# Patient Record
Sex: FEMALE | Race: WHITE | NOT HISPANIC OR LATINO | Employment: OTHER | URBAN - NONMETROPOLITAN AREA
[De-identification: names, ages, dates, MRNs, and addresses within clinical notes are randomized per-mention and may not be internally consistent; named-entity substitution may affect disease eponyms.]

---

## 2020-12-30 ENCOUNTER — OFFICE VISIT (OUTPATIENT)
Dept: URGENT CARE | Facility: CLINIC | Age: 65
End: 2020-12-30
Payer: MEDICARE

## 2020-12-30 VITALS
DIASTOLIC BLOOD PRESSURE: 62 MMHG | BODY MASS INDEX: 24.86 KG/M2 | OXYGEN SATURATION: 94 % | SYSTOLIC BLOOD PRESSURE: 94 MMHG | RESPIRATION RATE: 14 BRPM | HEIGHT: 62 IN | HEART RATE: 93 BPM | TEMPERATURE: 98.4 F | WEIGHT: 135.1 LBS

## 2020-12-30 DIAGNOSIS — B02.9 HERPES ZOSTER WITHOUT COMPLICATION: ICD-10-CM

## 2020-12-30 PROCEDURE — 99204 OFFICE O/P NEW MOD 45 MIN: CPT | Performed by: PHYSICIAN ASSISTANT

## 2020-12-30 RX ORDER — PANTOPRAZOLE SODIUM 20 MG/1
20 TABLET, DELAYED RELEASE ORAL DAILY
COMMUNITY
End: 2021-01-20

## 2020-12-30 RX ORDER — VALACYCLOVIR HYDROCHLORIDE 1 G/1
1000 TABLET, FILM COATED ORAL 3 TIMES DAILY
Qty: 21 TAB | Refills: 0 | Status: SHIPPED | OUTPATIENT
Start: 2020-12-30 | End: 2021-01-06

## 2020-12-30 ASSESSMENT — ENCOUNTER SYMPTOMS
CHILLS: 0
SHORTNESS OF BREATH: 0
COUGH: 0
GASTROINTESTINAL NEGATIVE: 1
TINGLING: 0
FEVER: 0

## 2020-12-30 NOTE — PROGRESS NOTES
"Subjective:   Nita Sepulveda is a 65 y.o. female who presents for Knee Pain ( swollen left knee with a rash, itchy ,and hot to touch x 4 days )      HPI  Patient is a 65-year-old female who reports of a rash to the inside of her left thigh onset approximately 5 days ago.  She states the rash started with several clusters of pimples and blisters that cause pain and have mild itching.  She has associated tingling.  The pain radiates to her upper inside of her knee.  Her pain is mild to moderate.  She has not tried any medications for current symptoms.  She has a history of chickenpox in the past.  She denies any fever, chills, or injury.  She has no other complaints or concerns.        Review of Systems   Constitutional: Negative for chills and fever.   HENT: Negative.    Respiratory: Negative for cough and shortness of breath.    Cardiovascular: Negative for chest pain.   Gastrointestinal: Negative.    Skin:        Painful rash to the inside of left thigh with associated mild itching.   Neurological: Negative for tingling.   All other systems reviewed and are negative.      Medications:    • pantoprazole    Allergies: Patient has no known allergies.    Problem List: Nita Sepulveda does not have a problem list on file.    Surgical History:  No past surgical history on file.    Past Social Hx: Nita Sepulveda  reports that she has never smoked. She has never used smokeless tobacco. She reports current alcohol use.     Past Family Hx:  Nita Sepulveda family history is not on file.     Problem list, medications, and allergies reviewed by myself today in Epic.     Objective:     BP (!) 94/62 (BP Location: Right arm, Patient Position: Sitting)   Pulse 93   Temp 36.9 °C (98.4 °F) (Temporal)   Resp 14   Ht 1.562 m (5' 1.5\")   Wt 61.3 kg (135 lb 1.6 oz)   SpO2 94%   BMI 25.11 kg/m²     Physical Exam  Vitals signs reviewed.   Constitutional:       General: She is not in acute distress.     Appearance: Normal appearance. She is not " ill-appearing or toxic-appearing.   HENT:      Mouth/Throat:      Mouth: Mucous membranes are moist.      Pharynx: Oropharynx is clear. No oropharyngeal exudate or posterior oropharyngeal erythema.   Eyes:      Conjunctiva/sclera: Conjunctivae normal.   Neck:      Musculoskeletal: Neck supple.   Cardiovascular:      Rate and Rhythm: Normal rate and regular rhythm.      Heart sounds: Normal heart sounds.   Pulmonary:      Effort: Pulmonary effort is normal. No respiratory distress.      Breath sounds: Normal breath sounds. No rhonchi or rales.   Lymphadenopathy:      Cervical: No cervical adenopathy.   Skin:     General: Skin is warm and dry.             Comments: Several grouped clusters of papules and vesicles in a dermatomal distribution.  Negative surrounding erythema, edema.  Mild tenderness to palpation.  Negative drainage.  Negative proximal streaking.  Negative fluctuance.   Neurological:      General: No focal deficit present.      Mental Status: She is alert and oriented to person, place, and time.   Psychiatric:         Mood and Affect: Mood normal.         Behavior: Behavior normal.         Assessment/Plan:     Diagnosis and associated orders:     1. Herpes zoster without complication  valacyclovir (VALTREX) 1 GM Tab    mupirocin (BACTROBAN) 2 % Ointment      Comments/MDM:     • Discussed with patient her signs symptoms are consistent with shingles.  It appears that some of the vesicles have ruptured.  No drainage.   • Valacyclovir for 7 days.  • Bactroban ointment topically to cover for any overlying superficial staph infection.   • Tylenol for pain.  Keep area covered.  • Return if symptoms are not improving or any worsening such as any surrounding erythema, edema, drainage, fevers, chills or any other concerns.       Red flags discussed  Supportive care, differential diagnoses, and indications for immediate follow-up discussed with patient.    Patient expresses understanding and agrees to plan. Patient  denies any other questions or concerns.     Advised the patient to follow-up with the primary care physician for recheck, reevaluation, and consideration of further management.    Please note that this dictation was created using voice recognition software. I have made a reasonable attempt to correct obvious errors, but I expect that there are errors of grammar and possibly content that I did not discover before finalizing the note.    This note was electronically signed by Rell Santos PA-C

## 2021-01-04 ENCOUNTER — OFFICE VISIT (OUTPATIENT)
Dept: URGENT CARE | Facility: CLINIC | Age: 66
End: 2021-01-04
Payer: MEDICARE

## 2021-01-04 VITALS
HEART RATE: 83 BPM | BODY MASS INDEX: 25.68 KG/M2 | OXYGEN SATURATION: 92 % | RESPIRATION RATE: 16 BRPM | WEIGHT: 136 LBS | SYSTOLIC BLOOD PRESSURE: 120 MMHG | DIASTOLIC BLOOD PRESSURE: 90 MMHG | TEMPERATURE: 98.2 F | HEIGHT: 61 IN

## 2021-01-04 DIAGNOSIS — T88.7XXA NON-DOSE-RELATED ADVERSE REACTION TO MEDICATION, INITIAL ENCOUNTER: ICD-10-CM

## 2021-01-04 DIAGNOSIS — R10.13 DYSPEPSIA: ICD-10-CM

## 2021-01-04 DIAGNOSIS — K06.8 BLEEDING GUMS: ICD-10-CM

## 2021-01-04 PROCEDURE — 99213 OFFICE O/P EST LOW 20 MIN: CPT | Performed by: PHYSICIAN ASSISTANT

## 2021-01-04 NOTE — PROGRESS NOTES
"Subjective:      Nita Sepulveda is a 65 y.o. female who presents with Allergic Reaction (possible reaction on valacyclovir, teeth was bleeding, stomach upset )    Medications:    • mupirocin Oint  • pantoprazole  • valacyclovir Tabs    Allergies: Patient has no known allergies.    Problem List: Nita Sepulveda does not have a problem list on file.    Surgical History:  No past surgical history on file.    Past Social Hx: Nita Sepulveda  reports that she has never smoked. She has never used smokeless tobacco. She reports current alcohol use.     Past Family Hx:  Nita Sepulveda family history is not on file.     Problem list, medications, and allergies reviewed by myself today in Epic.         Patient presents with:  Allergic Reaction: possible reaction on valacyclovir, gums were bleeding after brushingyesterday, and stomach was quite upset after taking the valacyclovir.  Pt did not take her dose today.  Pt denies congestion, cough, swelling to tongue, lips, throat, rash, n/v/d, sob.  Pt is not sure if she needs different medication for her shingles rash.       Medication Reaction  This is a new problem. The current episode started yesterday. Episode frequency: once. The problem has been resolved. Associated symptoms include anorexia and nausea. Pertinent negatives include no abdominal pain, fever or vomiting. The symptoms are aggravated by drinking and eating. Treatments tried: salt water swishes with cold water. The treatment provided significant relief.       Review of Systems   Constitutional: Negative for fever.   HENT:        Gums bleeding     Gastrointestinal: Positive for anorexia and nausea. Negative for abdominal pain and vomiting.   All other systems reviewed and are negative.         Objective:     /90   Pulse 83   Temp 36.8 °C (98.2 °F)   Resp 16   Ht 1.549 m (5' 1\")   Wt 61.7 kg (136 lb)   SpO2 92%   BMI 25.70 kg/m²      Physical Exam  Vitals signs and nursing note reviewed.   Constitutional:       " General: She is not in acute distress.     Appearance: Normal appearance. She is well-developed and normal weight. She is not ill-appearing or toxic-appearing.   HENT:      Head: Normocephalic and atraumatic.      Right Ear: Tympanic membrane normal.      Left Ear: Tympanic membrane normal.      Nose: Nose normal.      Mouth/Throat:      Lips: Pink.      Mouth: Mucous membranes are moist. No injury or oral lesions.      Dentition: No gingival swelling or gum lesions.      Pharynx: Oropharynx is clear. Uvula midline.      Comments: No evidence of lesion or bleeding gums.  Good oral hygiene.   Eyes:      Extraocular Movements: Extraocular movements intact.      Conjunctiva/sclera: Conjunctivae normal.      Pupils: Pupils are equal, round, and reactive to light.   Neck:      Musculoskeletal: Normal range of motion and neck supple.   Cardiovascular:      Rate and Rhythm: Normal rate and regular rhythm.      Pulses: Normal pulses.      Heart sounds: Normal heart sounds.   Pulmonary:      Effort: Pulmonary effort is normal.      Breath sounds: Normal breath sounds.   Abdominal:      General: Bowel sounds are normal.      Palpations: Abdomen is soft.   Musculoskeletal: Normal range of motion.   Skin:     General: Skin is warm and dry.      Capillary Refill: Capillary refill takes less than 2 seconds.   Neurological:      General: No focal deficit present.      Mental Status: She is alert and oriented to person, place, and time.      Cranial Nerves: No cranial nerve deficit.      Motor: Motor function is intact.      Coordination: Coordination is intact.      Gait: Gait normal.   Psychiatric:         Mood and Affect: Mood normal.                 Assessment/Plan:         1. Non-dose-related adverse reaction to medication, initial encounter     2. Bleeding gums     3. Dyspepsia       Patient was evaluated in clinic today while wearing appropriate personal protective equipment.      PT instructed to DC remainder of the  valacylovir.      PT can take otc pepcid, maalox, mylanta, tums, or any other otc antacid for her stomach ache.      No respiratory involvement, but antihistamine use was discussed in case any develop. Pt verbalized understanding of this information.      PT should follow up with PCP in 1-2 days for re-evaluation if symptoms have not improved.      Discussed red flags and reasons to return to UC or ED.      Pt and/or family verbalized understanding of diagnosis and follow up instructions and was offered informational handout on diagnosis.  PT discharged.

## 2021-01-10 ASSESSMENT — ENCOUNTER SYMPTOMS
FEVER: 0
VOMITING: 0
ANOREXIA: 1
NAUSEA: 1
ABDOMINAL PAIN: 0

## 2021-01-14 ENCOUNTER — TELEPHONE (OUTPATIENT)
Dept: SCHEDULING | Facility: IMAGING CENTER | Age: 66
End: 2021-01-14

## 2021-01-20 ENCOUNTER — OFFICE VISIT (OUTPATIENT)
Dept: MEDICAL GROUP | Facility: PHYSICIAN GROUP | Age: 66
End: 2021-01-20
Payer: MEDICARE

## 2021-01-20 VITALS
OXYGEN SATURATION: 96 % | RESPIRATION RATE: 16 BRPM | SYSTOLIC BLOOD PRESSURE: 108 MMHG | HEART RATE: 70 BPM | DIASTOLIC BLOOD PRESSURE: 70 MMHG | WEIGHT: 136 LBS | TEMPERATURE: 98 F | BODY MASS INDEX: 25.7 KG/M2

## 2021-01-20 DIAGNOSIS — Z76.89 ESTABLISHING CARE WITH NEW DOCTOR, ENCOUNTER FOR: ICD-10-CM

## 2021-01-20 DIAGNOSIS — Z12.31 ENCOUNTER FOR SCREENING MAMMOGRAM FOR MALIGNANT NEOPLASM OF BREAST: ICD-10-CM

## 2021-01-20 DIAGNOSIS — B02.9 HERPES ZOSTER WITHOUT COMPLICATION: ICD-10-CM

## 2021-01-20 DIAGNOSIS — D50.9 IRON DEFICIENCY ANEMIA, UNSPECIFIED IRON DEFICIENCY ANEMIA TYPE: ICD-10-CM

## 2021-01-20 DIAGNOSIS — K25.7 CHRONIC GASTRIC ULCER WITHOUT HEMORRHAGE AND WITHOUT PERFORATION: ICD-10-CM

## 2021-01-20 DIAGNOSIS — K04.7 DENTAL INFECTION: ICD-10-CM

## 2021-01-20 DIAGNOSIS — Z13.820 SCREENING FOR OSTEOPOROSIS: ICD-10-CM

## 2021-01-20 PROCEDURE — 99214 OFFICE O/P EST MOD 30 MIN: CPT | Performed by: NURSE PRACTITIONER

## 2021-01-20 RX ORDER — PANTOPRAZOLE SODIUM 20 MG/1
20 TABLET, DELAYED RELEASE ORAL DAILY
COMMUNITY
End: 2021-01-20

## 2021-01-20 RX ORDER — DOXYCYCLINE HYCLATE 100 MG
TABLET ORAL
COMMUNITY
Start: 2021-01-13 | End: 2021-03-16

## 2021-01-20 RX ORDER — PANTOPRAZOLE SODIUM 20 MG/1
20 TABLET, DELAYED RELEASE ORAL DAILY
COMMUNITY
End: 2021-03-09 | Stop reason: SDUPTHER

## 2021-01-20 ASSESSMENT — ENCOUNTER SYMPTOMS
DIARRHEA: 1
PSYCHIATRIC NEGATIVE: 1
NEUROLOGICAL NEGATIVE: 1
EYES NEGATIVE: 1
CARDIOVASCULAR NEGATIVE: 1
CONSTITUTIONAL NEGATIVE: 1
RESPIRATORY NEGATIVE: 1
MUSCULOSKELETAL NEGATIVE: 1

## 2021-01-20 ASSESSMENT — PATIENT HEALTH QUESTIONNAIRE - PHQ9: CLINICAL INTERPRETATION OF PHQ2 SCORE: 0

## 2021-01-20 NOTE — ASSESSMENT & PLAN NOTE
Patient was seen in urgent care for a rash to her left inner knee was diagnosed with shingles.  Patient was started on valacyclovir but had a possible reaction with bleeding gums and dyspepsia.  Patient no longer taking valacyclovir and currently using Bactroban ointment on the shingles rash.  Denies any current issues at this time denies fever chills nausea vomiting.  States rash is getting better and beginning to heal.

## 2021-01-20 NOTE — PROGRESS NOTES
Chief Complaint   Patient presents with   • Establish Care     prior [facility] pt      Subjective:     Nita Sepulveda is a 65 y.o. female presenting for       Chronic gastric ulcer without hemorrhage and without perforation  Patient has been on Protonix for the last 8 years.  Was seen by Dr. Jacobson at [facility].  Was diagnosed with a gastric ulcer is currently taking 20 mg Protonix daily.  Controlled, continue with current meds and lifestyle.        Herpes zoster without complication  Patient was seen in urgent care for a rash to her left inner knee was diagnosed with shingles.  Patient was started on valacyclovir but had a possible reaction with bleeding gums and dyspepsia.  Patient no longer taking valacyclovir and currently using Bactroban ointment on the shingles rash.  Denies any current issues at this time denies fever chills nausea vomiting.  States rash is getting better and beginning to heal.      Dental infection  Was seen last week by her dentist.  Possible infection to her left incisisor tooth.  Patient started on doxycycline on 1/13/2021.   Denies chills or fevers at this time.          Review of Systems   Constitutional: Negative.    HENT: Negative.    Eyes: Negative.    Respiratory: Negative.    Cardiovascular: Negative.    Gastrointestinal: Positive for diarrhea.        Taking antibiotics, they give her diarrhea   Genitourinary: Negative.    Musculoskeletal: Negative.    Skin: Positive for rash.        Left inner thigh, not painful anymore, diagnosed with shingles   Neurological: Negative.    Psychiatric/Behavioral: Negative.         Review of systems:  See above.   Denies any symptoms unless previously indicated.        Current Outpatient Medications:   •  doxycycline (VIBRAMYCIN) 100 MG Tab, , Disp: , Rfl:   •  pantoprazole (PROTONIX) 20 MG tablet, Take 20 mg by mouth every day., Disp: , Rfl:   •  mupirocin (BACTROBAN) 2 % Ointment, Apply 1 Application topically 2 times a day., Disp: 22 g, Rfl: 0  •  Multiple  Vitamins-Minerals (MULTIVITAMIN ADULT PO), Take 1 Tab by mouth every day., Disp: , Rfl:     Past Medical History:   Diagnosis Date   • Allergic rhinitis 6/2/2005   • Dysuria 8/30/2011   • Nocturia 8/30/2011   • Urgency of urination 8/30/2011       History reviewed. No pertinent surgical history.    Family History   Problem Relation Age of Onset   • Psychiatric Illness Mother    • Cancer Father    • Heart Disease Father    • Cancer Sister    • Diabetes Son        Hydrocodone-acetaminophen    Allergies, past medical history, past surgical history, family history, social history reviewed and updated    Objective:     Vitals: /70 (BP Location: Right arm, Patient Position: Sitting)   Pulse 70   Temp 36.7 °C (98 °F)   Resp 16   Wt 61.7 kg (136 lb)   SpO2 96%   BMI 25.70 kg/m²   General: Alert, pleasant, NAD  EYES:   PERRL, EOMI, no icterus or pallor.  Conjunctivae and lids normal.   HENT:  Normocephalic.  External ears normal. Tympanic membranes pearly, opaque.  No nasal drainage present.  Oropharynx non-erythematous, mucous membranes moist.  Neck supple.   No thyromegaly or masses palpated. No cervical or supraclavicular lymphadenopathy.   Heart: Regular rate and rhythm.  S1 and S2 normal.  No murmurs appreciated.  Respiratory: Normal respiratory effort.  Clear to auscultation bilaterally.  Abdomen: Non-distended, soft, non-tender, no guarding/rebound. Bowel sounds present.  No hepatosplenomegaly.  No hernias.  Skin: Warm, dry, no rashes to visible skin. See ROS for patient description.  Musculoskeletal: Gait is normal.  Moves all extremities well.    Extremities: No edema noted bilaterally.   Neurological: No tremors, sensation grossly intact, tone/strength normal, gait is normal, CN2-12 intact.  Psych:  Affect/mood is normal, judgement is good, memory is intact, grooming is appropriate.    Assessment/Plan:     1. Establishing care with new doctor, encounter for  - CBC WITHOUT DIFFERENTIAL; Future  - Comp  Metabolic Panel; Future  - Lipid Profile; Future  - TSH WITH REFLEX TO FT4; Future  - VITAMIN D,25 HYDROXY; Future  - MA-SCREENING MAMMO BILAT W/TOMOSYNTHESIS W/CAD; Future  - DS-BONE DENSITY STUDY (DEXA); Future    Pleasant 65-year-old female who is establishing care today.    Colon cancer screening : Per notes from her last PCP she had been given a Cologuard with regards to this she states that she has not done it yet but will have it completed before next visit.   Breast cancer screening :states that her next mammogram will be in March 2021 order placed  Osteoporosis screening: dexa Scan ordered. she believes she has never done this before    2. Herpes zoster without complication  - mupirocin (BACTROBAN) 2 % Ointment; Apply 1 Application topically 2 times a day.  Dispense: 22 g; Refill: 0  No longer taking valacyclovir due to possible reaction from bleeding gums and dyspepsia.  Patient has been using the Bactroban ointment and states that she has been doing fine.  Complains of an occasional itching but feels that this is from healing.  Denies any pain at this time    3. Chronic gastric ulcer without hemorrhage and without perforation  - CBC WITHOUT DIFFERENTIAL; Future  - Comp Metabolic Panel; Future  Was seen by Dr. Jacobson at [facility]    4. Dental infection  - CBC WITHOUT DIFFERENTIAL; Future  Currently taking doxycycline.  Has a dentist appointment coming up in the next 2 weeks.    5. Iron deficiency anemia, unspecified iron deficiency anemia type  - CBC WITHOUT DIFFERENTIAL; Future    6. Screening for osteoporosis  - DS-BONE DENSITY STUDY (DEXA); Future    7. Encounter for screening mammogram for malignant neoplasm of breast  - MA-SCREENING MAMMO BILAT W/TOMOSYNTHESIS W/CAD; Future       No follow-ups on file.      Discussed with patient possible alternative diagnoses, patient is to take all medications as prescribed.     Reviewed indication, dosage, usage and potential adverse effects of prescribed medications.      Reviewed risks and benefits of treatment plan. Patient verbalizes understanding of all instruction and verbally agrees to plan.    Please note that this dictation was created using voice recognition software. I have made every reasonable attempt to correct obvious errors, but I expect that there are errors of grammar and possibly content that I did not discover before finalizing the note.

## 2021-01-20 NOTE — ASSESSMENT & PLAN NOTE
Patient has been on Protonix for the last 8 years.  Was seen by Dr. Jacobson at [facility].  Was diagnosed with a gastric ulcer is currently taking 20 mg Protonix daily.  Controlled, continue with current meds and lifestyle.

## 2021-01-20 NOTE — ASSESSMENT & PLAN NOTE
Was seen last week by her dentist.  Possible infection to her left incisisor tooth.  Patient started on doxycycline on 1/13/2021.   Denies chills or fevers at this time.

## 2021-03-03 DIAGNOSIS — Z23 NEED FOR VACCINATION: ICD-10-CM

## 2021-03-09 RX ORDER — PANTOPRAZOLE SODIUM 20 MG/1
20 TABLET, DELAYED RELEASE ORAL DAILY
Qty: 90 TABLET | Refills: 3 | Status: SHIPPED | OUTPATIENT
Start: 2021-03-09 | End: 2022-01-04 | Stop reason: SDUPTHER

## 2021-03-09 NOTE — TELEPHONE ENCOUNTER
Received request via: Patient    Was the patient seen in the last year in this department? Yes    Does the patient have an active prescription (recently filled or refills available) for medication(s) requested? No  01/20/2021  Last office visit

## 2021-03-16 ENCOUNTER — OFFICE VISIT (OUTPATIENT)
Dept: MEDICAL GROUP | Facility: PHYSICIAN GROUP | Age: 66
End: 2021-03-16
Payer: MEDICARE

## 2021-03-16 VITALS
BODY MASS INDEX: 25.83 KG/M2 | RESPIRATION RATE: 18 BRPM | WEIGHT: 140.4 LBS | HEART RATE: 74 BPM | SYSTOLIC BLOOD PRESSURE: 110 MMHG | DIASTOLIC BLOOD PRESSURE: 70 MMHG | TEMPERATURE: 97.8 F | HEIGHT: 62 IN | OXYGEN SATURATION: 95 %

## 2021-03-16 DIAGNOSIS — Z13.220 SCREENING FOR HYPERLIPIDEMIA: ICD-10-CM

## 2021-03-16 DIAGNOSIS — K25.7 CHRONIC GASTRIC ULCER WITHOUT HEMORRHAGE AND WITHOUT PERFORATION: ICD-10-CM

## 2021-03-16 DIAGNOSIS — G89.29 CHRONIC PAIN OF LEFT KNEE: ICD-10-CM

## 2021-03-16 DIAGNOSIS — D50.9 IRON DEFICIENCY ANEMIA, UNSPECIFIED IRON DEFICIENCY ANEMIA TYPE: ICD-10-CM

## 2021-03-16 DIAGNOSIS — R45.89 PREDOMINANT DISTURBANCE OF EMOTIONS: ICD-10-CM

## 2021-03-16 DIAGNOSIS — M54.42 ACUTE LEFT-SIDED LOW BACK PAIN WITH LEFT-SIDED SCIATICA: ICD-10-CM

## 2021-03-16 DIAGNOSIS — M25.562 CHRONIC PAIN OF LEFT KNEE: ICD-10-CM

## 2021-03-16 PROBLEM — Z76.89 ESTABLISHING CARE WITH NEW DOCTOR, ENCOUNTER FOR: Status: RESOLVED | Noted: 2021-01-20 | Resolved: 2021-03-16

## 2021-03-16 PROCEDURE — 99213 OFFICE O/P EST LOW 20 MIN: CPT | Performed by: NURSE PRACTITIONER

## 2021-03-16 ASSESSMENT — ENCOUNTER SYMPTOMS
DIZZINESS: 0
FALLS: 0
BACK PAIN: 1
TINGLING: 0
HEADACHES: 0
WEAKNESS: 0

## 2021-03-16 NOTE — ASSESSMENT & PLAN NOTE
"When opening a documentation only encounter, be sure to enter in \"Chief Complaint\" Forms and in \" Comments\" Title of form, description if needed.    Donovan is a 22 month old  male  Form received via: Fax  Form now resides in: Provider Ready    Angelique Schumacher CMA        Form was completed during 12-19-19 OV. A copy was given to mom and a copy should have been scanned into the patients chart, per OV notes. See 12-19-19 OV for further information. Closing encounter.    Angelique Schumacher CMA on 4/13/2020 at 9:35 AM            " Chronic and stable condition.    She states that roughly a year ago when she was doing outdoor work her left leg got tied up in a house and she twisted it.  She states that she did complete PT however it was expensive's and at the time she did not seem to feel that it was working very well.  She states that she went to orthopedic who did not want to do an MRI at that time.  She states that she was placed in a boot but felt that the boot did more damage than good.  Denies any numbness tingling.  Denies instability of her knee.

## 2021-03-16 NOTE — PROGRESS NOTES
CC:  Chief Complaint   Patient presents with   • Hip Pain     x 1 week ago   • Leg Pain     left leg pain       HISTORY OF PRESENT ILLNESS: Patient is a 65 y.o. female established patient presenting:      Acute left-sided low back pain with left-sided sciatica  Undiagnosed new problem with uncertain prognosis.    States that 1 week ago she was attempting to get out of a hot tub and was twisting when she felt pain to her low back down to her left leg.  She feels as though it was as if somebody punched her.   She does state discomfort to her left knee however she states that been that way for about a year when she originally injured her knee.   She denies any trauma or falls.  She denies any numbness or tingling or loss of bowel or bladder continence.  Denies any swelling of the extremity.  She is able to ambulate on it    Chronic pain of left knee  Chronic and stable condition.    She states that roughly a year ago when she was doing outdoor work her left leg got tied up in a house and she twisted it.  She states that she did complete PT however it was expensive's and at the time she did not seem to feel that it was working very well.  She states that she went to orthopedic who did not want to do an MRI at that time.  She states that she was placed in a boot but felt that the boot did more damage than good.  Denies any numbness tingling.  Denies instability of her knee.      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 90 tablet 3   • Multiple Vitamins-Minerals (MULTIVITAMIN ADULT PO) Take 1 Tab by mouth every day.       No current facility-administered medications for this visit.       Social History     Tobacco Use   • Smoking status: Never Smoker   • Smokeless tobacco: Never Used   Substance Use Topics   • Alcohol use: Yes     Comment: occ   • Drug use: Not Currently     Social History     Social History Narrative   • Not on file       Family History   Problem Relation Age of Onset   • Psychiatric Illness Mother    • Cancer Father    • Heart Disease Father    • Cancer Sister    • Diabetes Son         Review of Systems   Genitourinary: Negative.    Musculoskeletal: Positive for back pain and joint pain. Negative for falls.   Neurological: Negative for dizziness, tingling, weakness and headaches.             - NOTE: All other systems reviewed and are negative, except as in HPI.      Exam:    /70   Pulse 74   Temp 36.6 °C (97.8 °F) (Temporal)   Resp 18   Ht 1.575 m (5' 2\")   Wt 63.7 kg (140 lb 6.4 oz)   SpO2 95%  Body mass index is 25.68 kg/m².    General: Alert, pleasant, NAD  EYES:   PERRL, EOMI, no icterus or pallor.  Conjunctivae and lids normal.   HENT:  Normocephalic.  External ears normal. Tympanic membranes pearly, opaque.  No nasal drainage present.  Oropharynx non-erythematous, mucous membranes moist.  Neck supple.   No thyromegaly or masses palpated. No cervical or supraclavicular lymphadenopathy.  Heart: Regular rate and rhythm.  S1 and S2 normal.  No murmurs appreciated.  Respiratory: Normal respiratory effort.  Clear to auscultation bilaterally.  Abdomen: Non-distended, soft, non-tender, no guarding/rebound. Bowel sounds present.  No hepatosplenomegaly.  No hernias.  Skin: Warm, dry, no rashes.  Musculoskeletal: Gait is normal.  Moves all extremities well.  Straight leg test completed " which was negative.  Extremities: No clubbing, cyanosis or edema noted.   Neurological: No tremors, sensation grossly intact, tone/strength normal, gait is normal, CN2-12 intact.  Psych:  Affect/mood is normal, judgement is good, memory is intact, grooming is appropriate.    Assessment/Plan:  1. Chronic pain of left knee  2. Acute left-sided low back pain with left-sided sciatica  - REFERRAL TO PHYSICAL THERAPY  Patient also asked if she can go to her chiropractor or her acupuncturist for this as well.  We will see what the evaluation shows and possibly do from here imaging at that time.    3. Iron deficiency anemia, unspecified iron deficiency anemia type  - CBC WITH DIFFERENTIAL; Future  - IRON/TOTAL IRON BIND; Future  - IRON; Future  - FERRITIN; Future    4. Chronic gastric ulcer without hemorrhage and without perforation  - CBC WITH DIFFERENTIAL; Future  - Comp Metabolic Panel; Future    5. Screening for hyperlipidemia  - Lipid Profile; Future    6. Predominant disturbance of emotions  - VITAMIN D,25 HYDROXY; Future  - TSH WITH REFLEX TO FT4; Future    Discussed with patient possible alternative diagnose.    Reviewed risks and benefits of treatment plan. Patient verbalizes understanding of all instruction and verbally agrees to plan.      Return in about 5 weeks (around 4/22/2021) for follow up labs and PT referral .    My total time spent caring for the patient on the day of the encounter was 25 minutes.   This does not include time spent on separately billable procedures/tests.     Please note that this dictation was created using voice recognition software. I have made every reasonable attempt to correct obvious errors, but I expect that there are errors of grammar and possibly content that I did not discover before finalizing the note.

## 2021-03-16 NOTE — PATIENT INSTRUCTIONS
1. Get labs completed prior to our next visit.  These will be fasting labs, do not eat or drink 8 to 10 hours prior to your labs.  Make sure that you drink lots of water.  We will discuss the results of these at our next appointment. [phone]    2.  Referral sent to Physical Therapy.  If you do not hear from them in the next 3-5 business days please reach out to me through [employer]. [phone]

## 2021-03-16 NOTE — ASSESSMENT & PLAN NOTE
Undiagnosed new problem with uncertain prognosis.    States that 1 week ago she was attempting to get out of a hot tub and was twisting when she felt pain to her low back down to her left leg.  She feels as though it was as if somebody punched her.   She does state discomfort to her left knee however she states that been that way for about a year when she originally injured her knee.   She denies any trauma or falls.  She denies any numbness or tingling or loss of bowel or bladder continence.  Denies any swelling of the extremity.  She is able to ambulate on it

## 2021-03-29 ENCOUNTER — HOSPITAL ENCOUNTER (OUTPATIENT)
Dept: LAB | Facility: MEDICAL CENTER | Age: 66
End: 2021-03-29
Attending: NURSE PRACTITIONER
Payer: MEDICARE

## 2021-03-29 DIAGNOSIS — D50.9 IRON DEFICIENCY ANEMIA, UNSPECIFIED IRON DEFICIENCY ANEMIA TYPE: ICD-10-CM

## 2021-03-29 DIAGNOSIS — K25.7 CHRONIC GASTRIC ULCER WITHOUT HEMORRHAGE AND WITHOUT PERFORATION: ICD-10-CM

## 2021-03-29 DIAGNOSIS — R45.89 PREDOMINANT DISTURBANCE OF EMOTIONS: ICD-10-CM

## 2021-03-29 DIAGNOSIS — Z13.220 SCREENING FOR HYPERLIPIDEMIA: ICD-10-CM

## 2021-03-29 LAB
BASOPHILS # BLD AUTO: 0.9 % (ref 0–1.8)
BASOPHILS # BLD: 0.06 K/UL (ref 0–0.12)
EOSINOPHIL # BLD AUTO: 0.16 K/UL (ref 0–0.51)
EOSINOPHIL NFR BLD: 2.5 % (ref 0–6.9)
ERYTHROCYTE [DISTWIDTH] IN BLOOD BY AUTOMATED COUNT: 42.7 FL (ref 35.9–50)
HCT VFR BLD AUTO: 42.3 % (ref 37–47)
HGB BLD-MCNC: 14.5 G/DL (ref 12–16)
IMM GRANULOCYTES # BLD AUTO: 0.02 K/UL (ref 0–0.11)
IMM GRANULOCYTES NFR BLD AUTO: 0.3 % (ref 0–0.9)
LYMPHOCYTES # BLD AUTO: 1.24 K/UL (ref 1–4.8)
LYMPHOCYTES NFR BLD: 19.6 % (ref 22–41)
MCH RBC QN AUTO: 30.6 PG (ref 27–33)
MCHC RBC AUTO-ENTMCNC: 34.3 G/DL (ref 33.6–35)
MCV RBC AUTO: 89.2 FL (ref 81.4–97.8)
MONOCYTES # BLD AUTO: 0.37 K/UL (ref 0–0.85)
MONOCYTES NFR BLD AUTO: 5.9 % (ref 0–13.4)
NEUTROPHILS # BLD AUTO: 4.47 K/UL (ref 2–7.15)
NEUTROPHILS NFR BLD: 70.8 % (ref 44–72)
NRBC # BLD AUTO: 0 K/UL
NRBC BLD-RTO: 0 /100 WBC
PLATELET # BLD AUTO: 318 K/UL (ref 164–446)
PMV BLD AUTO: 11.1 FL (ref 9–12.9)
RBC # BLD AUTO: 4.74 M/UL (ref 4.2–5.4)
WBC # BLD AUTO: 6.3 K/UL (ref 4.8–10.8)

## 2021-03-29 PROCEDURE — 85025 COMPLETE CBC W/AUTO DIFF WBC: CPT

## 2021-03-29 PROCEDURE — 84443 ASSAY THYROID STIM HORMONE: CPT

## 2021-03-29 PROCEDURE — 83540 ASSAY OF IRON: CPT

## 2021-03-29 PROCEDURE — 82728 ASSAY OF FERRITIN: CPT

## 2021-03-29 PROCEDURE — 36415 COLL VENOUS BLD VENIPUNCTURE: CPT

## 2021-03-29 PROCEDURE — 80061 LIPID PANEL: CPT | Mod: GA

## 2021-03-29 PROCEDURE — 83550 IRON BINDING TEST: CPT

## 2021-03-29 PROCEDURE — 80053 COMPREHEN METABOLIC PANEL: CPT

## 2021-03-30 LAB
ALBUMIN SERPL BCP-MCNC: 4.3 G/DL (ref 3.2–4.9)
ALBUMIN/GLOB SERPL: 1.6 G/DL
ALP SERPL-CCNC: 100 U/L (ref 30–99)
ALT SERPL-CCNC: 14 U/L (ref 2–50)
ANION GAP SERPL CALC-SCNC: 9 MMOL/L (ref 7–16)
AST SERPL-CCNC: 16 U/L (ref 12–45)
BILIRUB SERPL-MCNC: 0.3 MG/DL (ref 0.1–1.5)
BUN SERPL-MCNC: 16 MG/DL (ref 8–22)
CALCIUM SERPL-MCNC: 9.4 MG/DL (ref 8.5–10.5)
CHLORIDE SERPL-SCNC: 102 MMOL/L (ref 96–112)
CHOLEST SERPL-MCNC: 238 MG/DL (ref 100–199)
CO2 SERPL-SCNC: 27 MMOL/L (ref 20–33)
CREAT SERPL-MCNC: 0.83 MG/DL (ref 0.5–1.4)
FASTING STATUS PATIENT QL REPORTED: NORMAL
FERRITIN SERPL-MCNC: 77.9 NG/ML (ref 10–291)
GLOBULIN SER CALC-MCNC: 2.7 G/DL (ref 1.9–3.5)
GLUCOSE SERPL-MCNC: 102 MG/DL (ref 65–99)
HDLC SERPL-MCNC: 57 MG/DL
IRON SATN MFR SERPL: 28 % (ref 15–55)
IRON SERPL-MCNC: 83 UG/DL (ref 40–170)
LDLC SERPL CALC-MCNC: 151 MG/DL
POTASSIUM SERPL-SCNC: 4 MMOL/L (ref 3.6–5.5)
PROT SERPL-MCNC: 7 G/DL (ref 6–8.2)
SODIUM SERPL-SCNC: 138 MMOL/L (ref 135–145)
TIBC SERPL-MCNC: 295 UG/DL (ref 250–450)
TRIGL SERPL-MCNC: 149 MG/DL (ref 0–149)
TSH SERPL DL<=0.005 MIU/L-ACNC: 3.9 UIU/ML (ref 0.38–5.33)
UIBC SERPL-MCNC: 212 UG/DL (ref 110–370)

## 2021-04-01 ENCOUNTER — HOSPITAL ENCOUNTER (OUTPATIENT)
Dept: RADIOLOGY | Facility: MEDICAL CENTER | Age: 66
End: 2021-04-01
Payer: MEDICARE

## 2021-04-12 ENCOUNTER — HOSPITAL ENCOUNTER (OUTPATIENT)
Dept: RADIOLOGY | Facility: MEDICAL CENTER | Age: 66
End: 2021-04-12
Attending: NURSE PRACTITIONER
Payer: MEDICARE

## 2021-04-12 DIAGNOSIS — Z76.89 ESTABLISHING CARE WITH NEW DOCTOR, ENCOUNTER FOR: ICD-10-CM

## 2021-04-12 DIAGNOSIS — Z12.31 ENCOUNTER FOR SCREENING MAMMOGRAM FOR MALIGNANT NEOPLASM OF BREAST: ICD-10-CM

## 2021-04-12 PROCEDURE — 77063 BREAST TOMOSYNTHESIS BI: CPT

## 2021-04-22 ENCOUNTER — OFFICE VISIT (OUTPATIENT)
Dept: MEDICAL GROUP | Facility: PHYSICIAN GROUP | Age: 66
End: 2021-04-22
Payer: MEDICARE

## 2021-04-22 VITALS
TEMPERATURE: 98.8 F | HEART RATE: 73 BPM | DIASTOLIC BLOOD PRESSURE: 82 MMHG | RESPIRATION RATE: 20 BRPM | WEIGHT: 139.6 LBS | OXYGEN SATURATION: 98 % | HEIGHT: 62 IN | SYSTOLIC BLOOD PRESSURE: 132 MMHG | BODY MASS INDEX: 25.69 KG/M2

## 2021-04-22 DIAGNOSIS — G89.29 CHRONIC PAIN OF LEFT KNEE: ICD-10-CM

## 2021-04-22 DIAGNOSIS — E78.00 ELEVATED LOW DENSITY LIPOPROTEIN (LDL) CHOLESTEROL LEVEL: ICD-10-CM

## 2021-04-22 DIAGNOSIS — M25.562 CHRONIC PAIN OF LEFT KNEE: ICD-10-CM

## 2021-04-22 DIAGNOSIS — Z12.12 SCREENING FOR COLORECTAL CANCER: ICD-10-CM

## 2021-04-22 DIAGNOSIS — Z12.11 SCREENING FOR COLORECTAL CANCER: ICD-10-CM

## 2021-04-22 PROBLEM — B02.9 HERPES ZOSTER WITHOUT COMPLICATION: Status: RESOLVED | Noted: 2021-01-20 | Resolved: 2021-04-22

## 2021-04-22 PROBLEM — K04.7 DENTAL INFECTION: Status: RESOLVED | Noted: 2021-01-20 | Resolved: 2021-04-22

## 2021-04-22 PROCEDURE — 99213 OFFICE O/P EST LOW 20 MIN: CPT | Performed by: NURSE PRACTITIONER

## 2021-04-22 ASSESSMENT — ENCOUNTER SYMPTOMS
WEIGHT LOSS: 0
BLOOD IN STOOL: 0
CHILLS: 0
WHEEZING: 0
SHORTNESS OF BREATH: 0
FEVER: 0
PALPITATIONS: 0
COUGH: 0
WEAKNESS: 0
BACK PAIN: 1
DIZZINESS: 0
HEADACHES: 0

## 2021-04-22 ASSESSMENT — FIBROSIS 4 INDEX: FIB4 SCORE: 0.87

## 2021-04-22 NOTE — PROGRESS NOTES
CC:  Chief Complaint   Patient presents with   • Lab Results   • Knee Injury     pain for about a yr       HISTORY OF PRESENT ILLNESS: Patient is a 65 y.o. female established patient presenting for lab follow up and mammogram follow up. Patient also c/o bilateral knee pain.      Chronic pain of left knee  Chronic and stable condition  Continues to have pain to left knee that is starting to feel it in right knee  Motrin, tylenol, cbd salve,    Is wearing brace and using tape  Had a fall two weeks ago in mud room  Feels like there is a pull to her knee on medial side  States when standing she can not place full body weight on wither leg  Was seen and had xray completed at [facility] with Dr. Reis which did not show anything   They recommended her to go to physical therapy    Iron deficiency anemia  Results for JESÚS ROONEY (MRN [id number]) as of 4/22/2021 12:36   Ref. Range 3/29/2021 08:43   Iron Latest Ref Range: 40 - 170 ug/dL 83   Total Iron Binding Latest Ref Range: 250 - 450 ug/dL 295   % Saturation Latest Ref Range: 15 - 55 % 28   Unsat Iron Binding Latest Ref Range: 110 - 370 ug/dL 212   Results for JESÚS ROONEY (MRN [id number]) as of 4/22/2021 12:36   Ref. Range 3/29/2021 08:43   Ferritin Latest Ref Range: 10.0 - 291.0 ng/mL 77.9       Elevated low density lipoprotein (LDL) cholesterol level  Results for JESÚS ROONEY (MRN [id number]) as of 4/22/2021 12:36   Ref. Range 3/29/2021 08:43   Cholesterol,Tot Latest Ref Range: 100 - 199 mg/dL 238 (H)   Triglycerides Latest Ref Range: 0 - 149 mg/dL 149   HDL Latest Ref Range: >=40 mg/dL 57   LDL Latest Ref Range: <100 mg/dL 151 (H)       Patient Active Problem List    Diagnosis Date Noted   • Elevated low density lipoprotein (LDL) cholesterol level 04/22/2021   • Acute left-sided low back pain with left-sided sciatica 03/16/2021   • Chronic pain of left knee 03/16/2021   • Chronic gastric ulcer without hemorrhage and without perforation 01/20/2021   • Predominant disturbance of  "emotions 10/13/2011   • Iron deficiency anemia 01/20/2011      Allergies:Hydrocodone-acetaminophen    Current Outpatient Medications   Medication Sig Dispense Refill   • pantoprazole (PROTONIX) 20 MG tablet Take 1 tablet by mouth every day. 90 tablet 3   • Multiple Vitamins-Minerals (MULTIVITAMIN ADULT PO) Take 1 Tab by mouth every day.       No current facility-administered medications for this visit.       Social History     Tobacco Use   • Smoking status: Never Smoker   • Smokeless tobacco: Never Used   Substance Use Topics   • Alcohol use: Yes     Comment: occ   • Drug use: Not Currently     Social History     Social History Narrative   • Not on file       Family History   Problem Relation Age of Onset   • Psychiatric Illness Mother    • Cancer Father    • Heart Disease Father    • Cancer Sister    • Diabetes Son         Review of Systems   Constitutional: Negative for chills, fever and weight loss.   Respiratory: Negative for cough, shortness of breath and wheezing.    Cardiovascular: Negative for chest pain, palpitations and leg swelling.   Gastrointestinal: Negative for blood in stool.   Genitourinary: Negative for hematuria.   Musculoskeletal: Positive for back pain and joint pain.        Left and right knee pain, left worse than right. Feels unstable and pain on inner knee. Lower right hip pain baseline for her and lower left back pain also baseline.    Neurological: Negative for dizziness, weakness and headaches.             - NOTE: All other systems reviewed and are negative, except as in HPI.      Exam:    /82   Pulse 73   Temp 37.1 °C (98.8 °F) (Temporal)   Resp 20   Ht 1.575 m (5' 2\")   Wt 63.3 kg (139 lb 9.6 oz)   SpO2 98%  Body mass index is 25.53 kg/m².    General: Alert, pleasant, NAD  EYES:   PERRL, EOMI, no icterus or pallor.  Conjunctivae and lids normal.   HENT:  Normocephalic.  External ears normal.   Heart: Regular rate and rhythm.   Respiratory: Normal respiratory effort.  "   Abdomen: Non-distended, soft, non-tender, no guarding/rebound.   Skin: Warm, dry, no rashes.  Musculoskeletal: Gait is normal.  Moves all extremities well. Negative McMurrays, positive valgus , negative lackmans    Extremities: No clubbing, cyanosis or edema noted.   Neurological: No tremors, sensation grossly intact, tone/strength normal, gait is normal, CN2-12 intact.  Psych:  Affect/mood is normal, judgement is good, memory is intact, grooming is appropriate.      LABS: 3/29/2021 and 4/12/2021: Results reviewed and discussed with the patient, questions answered.    Assessment/Plan:    1. Chronic pain of left knee  - DX-KNEE 3 VIEWS LEFT; Future  - MR-KNEE-W/O LEFT; Future    2. Screening for colorectal cancer  - COLOGUARD (FIT DNA)    3. Elevated low density lipoprotein (LDL) cholesterol level  Continue with lifestyle modifications, diet and exercise    Discussed with patient possible alternative diagnoses, patient is to take all medications as prescribed.     If symptoms persist FU w/PCP, if symptoms worsen go to emergency room.     If experiencing any side effects from prescribed medications reports to the office immediately or go to emergency room.    Reviewed indication, dosage, usage and potential adverse effects of prescribed medications.     Reviewed risks and benefits of treatment plan. Patient verbalizes understanding of all instruction and verbally agrees to plan.      Return in about 1 year (around 4/22/2022) for Annual wellness visit.    My total time spent caring for the patient on the day of the encounter was 28 minutes.   This does not include time spent on separately billable procedures/tests.     Please note that this dictation was created using voice recognition software. I have made every reasonable attempt to correct obvious errors, but I expect that there are errors of grammar and possibly content that I did not discover before finalizing the note.

## 2021-04-22 NOTE — ASSESSMENT & PLAN NOTE
Results for JESÚS ROONEY (MRN [id number]) as of 4/22/2021 12:36   Ref. Range 3/29/2021 08:43   Cholesterol,Tot Latest Ref Range: 100 - 199 mg/dL 238 (H)   Triglycerides Latest Ref Range: 0 - 149 mg/dL 149   HDL Latest Ref Range: >=40 mg/dL 57   LDL Latest Ref Range: <100 mg/dL 151 (H)

## 2021-04-22 NOTE — ASSESSMENT & PLAN NOTE
Chronic and stable condition  Continues to have pain to left knee that is starting to feel it in right knee  Motrin, tylenol, cbd salve,    Is wearing brace and using tape  Had a fall two weeks ago in mud room  Feels like there is a pull to her knee on medial side  States when standing she can not place full body weight on either leg  Was seen last year for same issues and had xray completed at [facility] with Dr. Reis which did not show anything   They recommended her to go to physical therapy

## 2021-04-22 NOTE — ASSESSMENT & PLAN NOTE
Results for JESÚS ROONEY (MRN [id number]) as of 4/22/2021 12:36   Ref. Range 3/29/2021 08:43   Iron Latest Ref Range: 40 - 170 ug/dL 83   Total Iron Binding Latest Ref Range: 250 - 450 ug/dL 295   % Saturation Latest Ref Range: 15 - 55 % 28   Unsat Iron Binding Latest Ref Range: 110 - 370 ug/dL 212   Results for JESÚS ROONEY (MRN [id number]) as of 4/22/2021 12:36   Ref. Range 3/29/2021 08:43   Ferritin Latest Ref Range: 10.0 - 291.0 ng/mL 77.9

## 2021-04-27 ENCOUNTER — APPOINTMENT (OUTPATIENT)
Dept: RADIOLOGY | Facility: IMAGING CENTER | Age: 66
End: 2021-04-27
Attending: NURSE PRACTITIONER
Payer: MEDICARE

## 2021-04-27 ENCOUNTER — APPOINTMENT (OUTPATIENT)
Dept: URGENT CARE | Facility: CLINIC | Age: 66
End: 2021-04-27
Payer: MEDICARE

## 2021-04-27 DIAGNOSIS — G89.29 CHRONIC PAIN OF LEFT KNEE: ICD-10-CM

## 2021-04-27 DIAGNOSIS — M25.562 CHRONIC PAIN OF LEFT KNEE: ICD-10-CM

## 2021-04-27 PROCEDURE — 73562 X-RAY EXAM OF KNEE 3: CPT | Mod: TC,LT | Performed by: NURSE PRACTITIONER

## 2021-05-24 ENCOUNTER — HOSPITAL ENCOUNTER (OUTPATIENT)
Dept: RADIOLOGY | Facility: MEDICAL CENTER | Age: 66
End: 2021-05-24
Attending: NURSE PRACTITIONER
Payer: MEDICARE

## 2021-05-24 DIAGNOSIS — M25.562 CHRONIC PAIN OF LEFT KNEE: ICD-10-CM

## 2021-05-24 DIAGNOSIS — G89.29 CHRONIC PAIN OF LEFT KNEE: ICD-10-CM

## 2021-05-24 PROCEDURE — 73721 MRI JNT OF LWR EXTRE W/O DYE: CPT | Mod: LT,MG

## 2021-05-25 DIAGNOSIS — S83.242A TEAR OF MEDIAL MENISCUS OF LEFT KNEE, CURRENT, UNSPECIFIED TEAR TYPE, INITIAL ENCOUNTER: ICD-10-CM

## 2021-05-26 NOTE — PROGRESS NOTES
MRI reviewed  1. Horizontal oblique tear of the body and posterior horn medial meniscus.     2. Severe chondrosis of the medial compartment with full-thickness cartilage loss and subchondral edema.     3. Moderate knee joint effusion with synovitis.    Order placed for Orthopedic referral. PC made to patient to discuss

## 2021-09-17 ENCOUNTER — HOSPITAL ENCOUNTER (OUTPATIENT)
Facility: MEDICAL CENTER | Age: 66
End: 2021-09-17
Attending: NURSE PRACTITIONER
Payer: MEDICARE

## 2021-09-17 ENCOUNTER — OFFICE VISIT (OUTPATIENT)
Dept: MEDICAL GROUP | Facility: PHYSICIAN GROUP | Age: 66
End: 2021-09-17
Payer: MEDICARE

## 2021-09-17 VITALS
SYSTOLIC BLOOD PRESSURE: 126 MMHG | DIASTOLIC BLOOD PRESSURE: 78 MMHG | TEMPERATURE: 99.2 F | WEIGHT: 141.6 LBS | RESPIRATION RATE: 18 BRPM | OXYGEN SATURATION: 95 % | HEIGHT: 62 IN | BODY MASS INDEX: 26.06 KG/M2 | HEART RATE: 73 BPM

## 2021-09-17 DIAGNOSIS — N30.01 ACUTE CYSTITIS WITH HEMATURIA: ICD-10-CM

## 2021-09-17 DIAGNOSIS — N89.8 VAGINAL ITCHING: ICD-10-CM

## 2021-09-17 LAB
APPEARANCE UR: NORMAL
BILIRUB UR STRIP-MCNC: NEGATIVE MG/DL
COLOR UR AUTO: NORMAL
GLUCOSE UR STRIP.AUTO-MCNC: NEGATIVE MG/DL
KETONES UR STRIP.AUTO-MCNC: NEGATIVE MG/DL
LEUKOCYTE ESTERASE UR QL STRIP.AUTO: NORMAL
NITRITE UR QL STRIP.AUTO: NEGATIVE
PH UR STRIP.AUTO: 5.5 [PH] (ref 5–8)
PROT UR QL STRIP: NEGATIVE MG/DL
RBC UR QL AUTO: NORMAL
SP GR UR STRIP.AUTO: 1.01
UROBILINOGEN UR STRIP-MCNC: NORMAL MG/DL

## 2021-09-17 PROCEDURE — 87186 SC STD MICRODIL/AGAR DIL: CPT

## 2021-09-17 PROCEDURE — 99213 OFFICE O/P EST LOW 20 MIN: CPT | Performed by: NURSE PRACTITIONER

## 2021-09-17 PROCEDURE — 87086 URINE CULTURE/COLONY COUNT: CPT

## 2021-09-17 PROCEDURE — 81002 URINALYSIS NONAUTO W/O SCOPE: CPT | Performed by: NURSE PRACTITIONER

## 2021-09-17 RX ORDER — NITROFURANTOIN 25; 75 MG/1; MG/1
100 CAPSULE ORAL 2 TIMES DAILY
Qty: 10 CAPSULE | Refills: 0 | Status: SHIPPED | OUTPATIENT
Start: 2021-09-17 | End: 2021-11-10 | Stop reason: SDUPTHER

## 2021-09-17 ASSESSMENT — FIBROSIS 4 INDEX: FIB4 SCORE: 0.87

## 2021-09-17 ASSESSMENT — ENCOUNTER SYMPTOMS
HEADACHES: 0
DIZZINESS: 0
ABDOMINAL PAIN: 0
FEVER: 0
CHILLS: 0
FLANK PAIN: 0
PALPITATIONS: 0
MYALGIAS: 0
WEIGHT LOSS: 0

## 2021-09-17 NOTE — PROGRESS NOTES
CC:  Chief Complaint   Patient presents with   • Vaginal Itching     x 7days       HISTORY OF PRESENT ILLNESS: Patient is a 65 y.o. female established patient presenting vaginal itching x 1 week      Vaginal itching  Onset 1 week ago  Itchy  Used vagasil and Monistat which had minimal improvement  Denies pain with urination, foul smell of urine, vaginal discharge, blood in urine.    States change in laundry soap  States she is currently sexually active      Patient Active Problem List    Diagnosis Date Noted   • Vaginal itching 09/17/2021   • Elevated low density lipoprotein (LDL) cholesterol level 04/22/2021   • Acute left-sided low back pain with left-sided sciatica 03/16/2021   • Chronic pain of left knee 03/16/2021   • Chronic gastric ulcer without hemorrhage and without perforation 01/20/2021   • Predominant disturbance of emotions 10/13/2011   • Iron deficiency anemia 01/20/2011      Allergies:Hydrocodone-acetaminophen    Current Outpatient Medications   Medication Sig Dispense Refill   • nitrofurantoin (MACROBID) 100 MG Cap Take 1 Capsule by mouth 2 times a day. 10 Capsule 0   • pantoprazole (PROTONIX) 20 MG tablet Take 1 tablet by mouth every day. 90 tablet 3   • Multiple Vitamins-Minerals (MULTIVITAMIN ADULT PO) Take 1 Tab by mouth every day.       No current facility-administered medications for this visit.       Social History     Tobacco Use   • Smoking status: Never Smoker   • Smokeless tobacco: Never Used   Vaping Use   • Vaping Use: Never used   Substance Use Topics   • Alcohol use: Yes     Comment: occ   • Drug use: Not Currently     Social History     Social History Narrative   • Not on file       Family History   Problem Relation Age of Onset   • Psychiatric Illness Mother    • Cancer Father    • Heart Disease Father    • Cancer Sister    • Diabetes Son         Review of Systems   Constitutional: Negative for chills, fever and weight loss.   Cardiovascular: Negative for chest pain and palpitations.  "  Gastrointestinal: Negative for abdominal pain.   Genitourinary: Positive for frequency. Negative for dysuria, flank pain, hematuria and urgency.   Musculoskeletal: Negative for myalgias.   Skin: Positive for itching.        Right inner labia   Neurological: Negative for dizziness and headaches.             - NOTE: All other systems reviewed and are negative, except as in HPI.      Exam:    /78   Pulse 73   Temp 37.3 °C (99.2 °F) (Temporal)   Resp 18   Ht 1.575 m (5' 2\")   Wt 64.2 kg (141 lb 9.6 oz)   SpO2 95%  Body mass index is 25.9 kg/m².    General: Alert, pleasant, NAD  EYES:   PERRL, EOMI, no icterus or pallor.  Conjunctivae and lids normal.   HENT:  Normocephalic.  External ears normal.  Respiratory: Normal respiratory effort.    Abdomen: Non-distended, soft, non-tender, no guarding/rebound.  No hepatosplenomegaly.  No hernias.  : no erythematous, swelling, ulcerated skin noted  Skin: Warm, dry, no rashes.  Musculoskeletal: Gait is normal.  Moves all extremities well.    Extremities: No clubbing, cyanosis or edema noted.   Neurological: No tremors, sensation grossly intact, tone/strength normal, gait is normal, CN2-12 intact.  Psych:  Affect/mood is normal, judgement is good, memory is intact, grooming is appropriate.    Assessment/Plan:  1. Vaginal itching  - POCT Urinalysis  Results for JESÚS ROONEY (MRN [id number]) as of 9/17/2021 15:47   Ref. Range 9/17/2021 14:10   POC Color Latest Ref Range: Negative  Light Yellow   POC Appearance Latest Ref Range: Negative  Cloudy   POC Specific Gravity Latest Ref Range: <1.005 - >1.030  1.015   POC Urine PH Latest Ref Range: 5.0 - 8.0  5.5   POC Glucose Latest Ref Range: Negative mg/dL Negative   POC Ketones Latest Ref Range: Negative mg/dL Negative   POC Protein Latest Ref Range: Negative mg/dL Negative   POC Nitrites Latest Ref Range: Negative  Negative   POC Leukocyte Esterase Latest Ref Range: Negative  Small   POC Blood Latest Ref Range: Negative  " Trace-intact   POC Bilirubin Latest Ref Range: Negative mg/dL Negative   POC Urobiligen Latest Ref Range: Negative (0.2) mg/dL 0.2 E.U/dL     2. Acute cystitis with hematuria  - nitrofurantoin (MACROBID) 100 MG Cap; Take 1 Capsule by mouth 2 times a day.  Dispense: 10 Capsule; Refill: 0  - URINE CULTURE(NEW); Future       Discussed with patient possible alternative diagnoses, patient is to take all medications as prescribed.     If symptoms persist FU w/PCP, if symptoms worsen go to emergency room.     If experiencing any side effects from prescribed medications reports to the office immediately or go to emergency room.    Reviewed indication, dosage, usage and potential adverse effects of prescribed medications.     Reviewed risks and benefits of treatment plan. Patient verbalizes understanding of all instruction and verbally agrees to plan.      No follow-ups on file.    My total time spent caring for the patient on the day of the encounter was 20 minutes.   This does not include time spent on separately billable procedures/tests.     Please note that this dictation was created using voice recognition software. I have made every reasonable attempt to correct obvious errors, but I expect that there are errors of grammar and possibly content that I did not discover before finalizing the note.

## 2021-09-17 NOTE — ASSESSMENT & PLAN NOTE
Onset 1 week ago  Itchy  Used vagasil and Monistat which had minimal improvement  Denies pain with urination, foul smell of urine, vaginal discharge, blood in urine.    States change in laundry soap  States she is currently sexually active

## 2021-09-19 LAB
AMBIGUOUS DTTM AMBI4: NORMAL
SIGNIFICANT IND 70042: NORMAL
SITE SITE: NORMAL
SOURCE SOURCE: NORMAL

## 2021-09-21 LAB
BACTERIA UR CULT: ABNORMAL
BACTERIA UR CULT: ABNORMAL
SIGNIFICANT IND 70042: ABNORMAL
SITE SITE: ABNORMAL
SOURCE SOURCE: ABNORMAL

## 2021-10-06 ENCOUNTER — OFFICE VISIT (OUTPATIENT)
Dept: MEDICAL GROUP | Facility: PHYSICIAN GROUP | Age: 66
End: 2021-10-06
Payer: MEDICARE

## 2021-10-06 VITALS
WEIGHT: 142 LBS | HEIGHT: 62 IN | RESPIRATION RATE: 18 BRPM | BODY MASS INDEX: 26.13 KG/M2 | DIASTOLIC BLOOD PRESSURE: 68 MMHG | HEART RATE: 78 BPM | SYSTOLIC BLOOD PRESSURE: 112 MMHG | OXYGEN SATURATION: 97 % | TEMPERATURE: 97.9 F

## 2021-10-06 DIAGNOSIS — R10.2 PELVIC PRESSURE IN FEMALE: ICD-10-CM

## 2021-10-06 DIAGNOSIS — J34.89 SINUS PRESSURE: ICD-10-CM

## 2021-10-06 DIAGNOSIS — J01.90 ACUTE BACTERIAL RHINOSINUSITIS: ICD-10-CM

## 2021-10-06 DIAGNOSIS — M25.562 CHRONIC PAIN OF LEFT KNEE: ICD-10-CM

## 2021-10-06 DIAGNOSIS — G89.29 CHRONIC PAIN OF LEFT KNEE: ICD-10-CM

## 2021-10-06 DIAGNOSIS — B96.89 ACUTE BACTERIAL RHINOSINUSITIS: ICD-10-CM

## 2021-10-06 PROBLEM — N89.8 VAGINAL ITCHING: Status: RESOLVED | Noted: 2021-09-17 | Resolved: 2021-10-06

## 2021-10-06 LAB
APPEARANCE UR: CLEAR
BILIRUB UR STRIP-MCNC: NEGATIVE MG/DL
COLOR UR AUTO: NORMAL
GLUCOSE UR STRIP.AUTO-MCNC: NEGATIVE MG/DL
KETONES UR STRIP.AUTO-MCNC: NEGATIVE MG/DL
LEUKOCYTE ESTERASE UR QL STRIP.AUTO: NEGATIVE
NITRITE UR QL STRIP.AUTO: NEGATIVE
PH UR STRIP.AUTO: 6.5 [PH] (ref 5–8)
PROT UR QL STRIP: NEGATIVE MG/DL
RBC UR QL AUTO: NEGATIVE
SP GR UR STRIP.AUTO: 1.01
UROBILINOGEN UR STRIP-MCNC: NORMAL MG/DL

## 2021-10-06 PROCEDURE — 81002 URINALYSIS NONAUTO W/O SCOPE: CPT | Performed by: NURSE PRACTITIONER

## 2021-10-06 PROCEDURE — 99212 OFFICE O/P EST SF 10 MIN: CPT | Performed by: NURSE PRACTITIONER

## 2021-10-06 RX ORDER — AMOXICILLIN 875 MG/1
875 TABLET, COATED ORAL 2 TIMES DAILY
Qty: 10 TABLET | Refills: 0 | Status: SHIPPED | OUTPATIENT
Start: 2021-10-06 | End: 2021-10-11

## 2021-10-06 ASSESSMENT — ENCOUNTER SYMPTOMS
ABDOMINAL PAIN: 0
FEVER: 0
CHILLS: 0
SHORTNESS OF BREATH: 0
DIZZINESS: 0
HEADACHES: 0
FLANK PAIN: 0
VOMITING: 0
WEIGHT LOSS: 0
EYE PAIN: 0
NAUSEA: 0
SINUS PAIN: 1
SORE THROAT: 0
COUGH: 0

## 2021-10-06 ASSESSMENT — FIBROSIS 4 INDEX: FIB4 SCORE: 0.87

## 2021-10-06 NOTE — ASSESSMENT & PLAN NOTE
Facial pain  History of sinus infections  Was visiting the beach and noticed increase in pressure when coming home and now with allergies  Feels like sinuses are not draining  Treatment- flonase (but made her feel weird so she quit), water, tylenol as needed

## 2021-10-06 NOTE — ASSESSMENT & PLAN NOTE
Onset 9/29/2021  Feels like she is still having a UTI  burning after urination, pelvic pressure after voiding like she has not emptied all the way, frequency which is her baseline.   Denies urinary incontinence/leakage, foul odor, vaginal discharge, chills or fevers, constipation

## 2021-10-06 NOTE — PROGRESS NOTES
CC:  Chief Complaint   Patient presents with   • Sinus Problem     x1wk   • Follow-Up     UTI       HISTORY OF PRESENT ILLNESS: Patient is a 65 y.o. female established patient presenting UTI symptoms and sinus symptoms      Pelvic pressure in female  Onset 9/29/2021  Feels like she is still having a UTI  burning after urination, pelvic pressure after voiding like she has not emptied all the way, frequency which is her baseline.   Denies urinary incontinence/leakage, foul odor, vaginal discharge, chills or fevers, constipation      Sinus pressure  Facial pain  History of sinus infections  Was visiting the beach and noticed increase in pressure when coming home and now with allergies  Feels like sinuses are not draining  Treatment- flonase (but made her feel weird so she quit), water, tylenol as needed    Chronic pain of left knee  Has appt with Dr. Templeton end of october      Patient Active Problem List    Diagnosis Date Noted   • Pelvic pressure in female 10/06/2021   • Sinus pressure 10/06/2021   • Elevated low density lipoprotein (LDL) cholesterol level 04/22/2021   • Acute left-sided low back pain with left-sided sciatica 03/16/2021   • Chronic pain of left knee 03/16/2021   • Chronic gastric ulcer without hemorrhage and without perforation 01/20/2021   • Predominant disturbance of emotions 10/13/2011   • Iron deficiency anemia 01/20/2011      Allergies:Hydrocodone-acetaminophen    Current Outpatient Medications   Medication Sig Dispense Refill   • amoxicillin (AMOXIL) 875 MG tablet Take 1 Tablet by mouth 2 times a day for 5 days. 10 Tablet 0   • nitrofurantoin (MACROBID) 100 MG Cap Take 1 Capsule by mouth 2 times a day. 10 Capsule 0   • pantoprazole (PROTONIX) 20 MG tablet Take 1 tablet by mouth every day. 90 tablet 3   • Multiple Vitamins-Minerals (MULTIVITAMIN ADULT PO) Take 1 Tab by mouth every day.       No current facility-administered medications for this visit.       Social History     Tobacco Use   •  "Smoking status: Never Smoker   • Smokeless tobacco: Never Used   Vaping Use   • Vaping Use: Never used   Substance Use Topics   • Alcohol use: Yes     Comment: occ   • Drug use: Not Currently     Social History     Social History Narrative   • Not on file       Family History   Problem Relation Age of Onset   • Psychiatric Illness Mother    • Cancer Father    • Heart Disease Father    • Cancer Sister    • Diabetes Son         Review of Systems   Constitutional: Negative for chills, fever, malaise/fatigue and weight loss.   HENT: Positive for congestion and sinus pain. Negative for ear discharge, ear pain and sore throat.    Eyes: Negative for pain.        Around bilateral  eye discomfort occasionally but no swelling   Respiratory: Negative for cough and shortness of breath.    Cardiovascular: Negative for chest pain and leg swelling.   Gastrointestinal: Negative for abdominal pain, nausea and vomiting.   Genitourinary: Positive for frequency. Negative for dysuria, flank pain, hematuria and urgency.        Pelvic pressure occasionally after voiding, burning after urination    Neurological: Negative for dizziness and headaches.             - NOTE: All other systems reviewed and are negative, except as in HPI.      Exam:    /68   Pulse 78   Temp 36.6 °C (97.9 °F) (Temporal)   Resp 18   Ht 1.575 m (5' 2\")   Wt 64.4 kg (142 lb)   SpO2 97%  Body mass index is 25.97 kg/m².    General: Alert, pleasant, NAD  EYES:   PERRL, EOMI, no icterus or pallor.  Conjunctivae and lids normal.   HENT:  Normocephalic.  External ears normal. Tympanic membranes pearly, opaque.  No nasal drainage present.  Oropharynx non-erythematous, mucous membranes moist.  Neck supple.   No cervical or supraclavicular lymphadenopathy.  Heart: Regular rate and rhythm.  S1 and S2 normal.  No murmurs appreciated.  Respiratory: Normal respiratory effort.  Clear to auscultation bilaterally.  Abdomen: Non-distended, soft, non-tender, no " guarding/rebound. Bowel sounds present.  No hepatosplenomegaly.  No hernias. no CVA tenderness  Skin: Warm, dry, no rashes.  Musculoskeletal: Gait is normal.  Moves all extremities well.    Extremities: No clubbing, cyanosis or edema noted.   Neurological: No tremors, sensation grossly intact, tone/strength normal, gait is normal, CN2-12 intact.  Psych:  Affect/mood is normal, judgement is good, memory is intact, grooming is appropriate.    Assessment/Plan:  1. Pelvic pressure in female  - POCT Urinalysis  If symptoms continue pt plans to follow back up in office    2. Sinus pressure  3. Acute bacterial rhinosinusitis  - amoxicillin (AMOXIL) 875 MG tablet; Take 1 Tablet by mouth 2 times a day for 5 days.  Dispense: 10 Tablet; Refill: 0    4. Chronic pain of left knee  Continue with appt with Dr. Templeton in end of October.      Discussed with patient possible alternative diagnoses, patient is to take all medications as prescribed.     If symptoms persist FU w/PCP, if symptoms worsen go to emergency room.     If experiencing any side effects from prescribed medications reports to the office immediately or go to emergency room.    Reviewed indication, dosage, usage and potential adverse effects of prescribed medications.     Reviewed risks and benefits of treatment plan. Patient verbalizes understanding of all instruction and verbally agrees to plan.      Return for AWV.    My total time spent caring for the patient on the day of the encounter was 19 minutes.   This does not include time spent on separately billable procedures/tests.     Please note that this dictation was created using voice recognition software. I have made every reasonable attempt to correct obvious errors, but I expect that there are errors of grammar and possibly content that I did not discover before finalizing the note.

## 2021-10-06 NOTE — PATIENT INSTRUCTIONS
Wait and watch for 4 days if still not improvement then ok for antibiotics  Nasal irrigation   Nasal saline spray  Allegra or zyrtec or claritin- back of box   mucinex- DM or a decongestant  Steam showers/ steam anthony with vicks       How to Perform a Sinus Rinse  A sinus rinse is a home treatment. It rinses your sinuses with a mixture of salt and water (saline solution). Sinuses are air-filled spaces in your skull behind the bones of your face and forehead. They open into your nasal cavity.  A sinus rinse can help to clear your nasal cavity. It can clear mucus, dirt, dust, or pollen.  You may do a sinus rinse when you have:  · A cold.  · A virus.  · Allergies.  · A sinus infection.  · A stuffy nose.  Talk with your doctor about whether a sinus rinse might help you.  What are the risks?  A sinus rinse is normally very safe and helpful. However, there are a few risks. These include:  · A burning feeling in the sinuses. This may happen if you do not make the saline solution as instructed. Be sure to follow all directions when making the saline solution.  · Nasal irritation.  · Infection from unclean water. This is rare, but possible.  Do not do a sinus rinse if you have had:  · Ear or nasal surgery.  · An ear infection.  · Blocked ears.  Supplies needed:  · Saline solution or powder.  · Distilled or germ-free (sterile) water may be needed to mix with saline powder.  ? You may use boiled and cooled tap water. Boil tap water for 5 minutes; cool until it is lukewarm. Use within 24 hours.  ? Do not use regular tap water to mix with the saline solution.  · Neti pot or nasal rinse bottle. This releases the saline solution into your nose and through your sinuses. You can buy neti pots and rinse bottles:  ? At your local pharmacy.  ? At a health food store.  ? Online.  How to perform a sinus rinse    1. Wash your hands with soap and water.  2. Wash your device using the directions that came with it.  3. Dry your  device.  4. Use the solution that comes with your device or one that is sold separately in stores. Follow the mixing directions on the package if you need to mix with sterile or distilled water.  5. Fill your device with the amount of saline solution stated in the device instructions.  6. Stand over a sink and tilt your head sideways over the sink.  7. Place the spout of the device in your upper nostril (the one closer to the ceiling).  8. Gently pour or squeeze the saline solution into your nasal cavity. The liquid should drain to your lower nostril if you are not too stuffed up (congested).  9. While rinsing, breathe through your open mouth.  10. Gently blow your nose to clear any mucus and rinse solution. Blowing too hard may cause ear pain.  11. Repeat in your other nostril.  12. Clean and rinse your device with clean water.  13. Air-dry your device.  Talk with your doctor or pharmacist if you have questions about how to do a sinus rinse.  Summary  · A sinus rinse is a home treatment. It rinses your sinuses with a mixture of salt and water (saline solution).  · A sinus rinse is normally very safe and helpful. Follow all instructions carefully.  · Talk with your doctor about whether a sinus rinse might help you.  This information is not intended to replace advice given to you by your health care provider. Make sure you discuss any questions you have with your health care provider.  Document Released: 07/15/2015 Document Revised: 10/15/2018 Document Reviewed: 10/15/2018  [employer]vier Patient Education © 2020 [employer]

## 2021-11-10 ENCOUNTER — HOSPITAL ENCOUNTER (OUTPATIENT)
Facility: MEDICAL CENTER | Age: 66
End: 2021-11-10
Attending: NURSE PRACTITIONER
Payer: MEDICARE

## 2021-11-10 ENCOUNTER — APPOINTMENT (OUTPATIENT)
Dept: MEDICAL GROUP | Facility: PHYSICIAN GROUP | Age: 66
End: 2021-11-10
Payer: MEDICARE

## 2021-11-10 ENCOUNTER — OFFICE VISIT (OUTPATIENT)
Dept: MEDICAL GROUP | Facility: PHYSICIAN GROUP | Age: 66
End: 2021-11-10
Payer: MEDICARE

## 2021-11-10 VITALS
TEMPERATURE: 98.4 F | RESPIRATION RATE: 18 BRPM | OXYGEN SATURATION: 96 % | HEIGHT: 62 IN | BODY MASS INDEX: 25.43 KG/M2 | SYSTOLIC BLOOD PRESSURE: 110 MMHG | WEIGHT: 138.2 LBS | DIASTOLIC BLOOD PRESSURE: 70 MMHG | HEART RATE: 76 BPM

## 2021-11-10 DIAGNOSIS — N89.8 VAGINAL ITCHING: ICD-10-CM

## 2021-11-10 DIAGNOSIS — N30.01 ACUTE CYSTITIS WITH HEMATURIA: ICD-10-CM

## 2021-11-10 DIAGNOSIS — R30.0 DYSURIA: ICD-10-CM

## 2021-11-10 DIAGNOSIS — N39.0 RECURRENT UTI: ICD-10-CM

## 2021-11-10 LAB
APPEARANCE UR: NORMAL
BILIRUB UR STRIP-MCNC: NEGATIVE MG/DL
COLOR UR AUTO: NORMAL
GLUCOSE UR STRIP.AUTO-MCNC: NEGATIVE MG/DL
KETONES UR STRIP.AUTO-MCNC: NEGATIVE MG/DL
LEUKOCYTE ESTERASE UR QL STRIP.AUTO: NORMAL
NITRITE UR QL STRIP.AUTO: POSITIVE
PH UR STRIP.AUTO: 6 [PH] (ref 5–8)
PROT UR QL STRIP: NEGATIVE MG/DL
RBC UR QL AUTO: NORMAL
SP GR UR STRIP.AUTO: 1.02
UROBILINOGEN UR STRIP-MCNC: NORMAL MG/DL

## 2021-11-10 PROCEDURE — 81002 URINALYSIS NONAUTO W/O SCOPE: CPT | Performed by: NURSE PRACTITIONER

## 2021-11-10 PROCEDURE — 87480 CANDIDA DNA DIR PROBE: CPT

## 2021-11-10 PROCEDURE — 87086 URINE CULTURE/COLONY COUNT: CPT

## 2021-11-10 PROCEDURE — 87660 TRICHOMONAS VAGIN DIR PROBE: CPT

## 2021-11-10 PROCEDURE — 99214 OFFICE O/P EST MOD 30 MIN: CPT | Performed by: NURSE PRACTITIONER

## 2021-11-10 PROCEDURE — 87186 SC STD MICRODIL/AGAR DIL: CPT

## 2021-11-10 PROCEDURE — 87510 GARDNER VAG DNA DIR PROBE: CPT

## 2021-11-10 RX ORDER — NITROFURANTOIN 25; 75 MG/1; MG/1
100 CAPSULE ORAL 2 TIMES DAILY
Qty: 10 CAPSULE | Refills: 0 | Status: SHIPPED | OUTPATIENT
Start: 2021-11-10 | End: 2021-12-01

## 2021-11-10 ASSESSMENT — FIBROSIS 4 INDEX: FIB4 SCORE: 0.87

## 2021-11-10 NOTE — PROGRESS NOTES
"Chief Complaint   Patient presents with   • Diarrhea     x5day   • Other     Poss UTI       HPI:  Recurrent UTI  Onset 11/5/2021  Vaginal itching and diarrhea  Monday started to get nausea, vomiting.   States bladder pain, low back discomfort, odor that smells like urine  Second UTI this year- 9/2021 and today  Was previously seen by urology and was placed on macrobid for long term use. Last time she saw urology was 6-7 years ago  Was seen by [facility] [place]  Denies fevers, chills, CVA tenderness, change in mentation, vaginal discharge    Since onset symptoms are: Unchanged  Treatments tried: not taking any medication for this  Associated symptoms: Negative for fever, flank pain, vaginal discharge, pelvic pain.  History is positive for frequent UTI.     ROS:  See HPI.    OBJECTIVE:  /70   Pulse 76   Temp 36.9 °C (98.4 °F) (Temporal)   Resp 18   Ht 1.575 m (5' 2\")   Wt 62.7 kg (138 lb 3.2 oz)   SpO2 96%   Gen: Alert, NAD.  Chest: Lungs clear to auscultation, CV RRR.  Abdomen: Soft, tender in suprapubic region. No CVAT. Normal bowel sounds.    Lab Results   Component Value Date/Time    POCCOLOR Dark Yellow 11/10/2021 02:23 PM    POCAPPEAR Cloudy 11/10/2021 02:23 PM    POCLEUKEST Small 11/10/2021 02:23 PM    POCNITRITE Positive 11/10/2021 02:23 PM    POCUROBILIGE 0.2 E.U/dL 11/10/2021 02:23 PM    POCPROTEIN Negative 11/10/2021 02:23 PM    POCURPH 6.0 11/10/2021 02:23 PM    POCBLOOD Trace-intact 11/10/2021 02:23 PM    POCSPGRV 1.020 11/10/2021 02:23 PM    POCKETONES Negative 11/10/2021 02:23 PM    POCBILIRUBIN Negative 11/10/2021 02:23 PM    POCGLUCUA Negative 11/10/2021 02:23 PM         ASSESSMENT/PLAN:  1. Dysuria  - POCT Urinalysis  - VAGINAL PATHOGENS DNA PANEL; Future  - URINE CULTURE(NEW); Future    2. Acute cystitis with hematuria  - nitrofurantoin (MACROBID) 100 MG Cap; Take 1 Capsule by mouth 2 times a day.  Dispense: 10 Capsule; Refill: 0  - URINE CULTURE(NEW); Future    3. Recurrent UTI  We will " continue to monitor  - URINE CULTURE(NEW); Future    4. Vaginal itching  - VAGINAL PATHOGENS DNA PANEL; Future    1. Abnormal urine dipstick in office. Urine sent for culture. Start antibiotics.  2. Provided education to drink plenty of fluids, wipe front to back every void and bowel movement.   3. Return to clinic if symptoms not improving within 3-4 days or in case of vomiting, fever, increasing pain.

## 2021-11-10 NOTE — ASSESSMENT & PLAN NOTE
Onset 11/5/2021  Vaginal itching and diarrhea  Monday started to get nausea, vomiting.   States bladder pain, low back discomfort, odor that smells like urine  Second UTI this year- 9/2021 and today  Was previously seen by urology and was placed on macrobid for long term use. Last time she saw urology was 6-7 years ago  Was seen by [facility] [place]  Denies fevers, chills, CVA tenderness, change in mentation, vaginal discharge

## 2021-11-11 ENCOUNTER — TELEPHONE (OUTPATIENT)
Dept: MEDICAL GROUP | Facility: PHYSICIAN GROUP | Age: 66
End: 2021-11-11

## 2021-11-11 DIAGNOSIS — N76.0 BV (BACTERIAL VAGINOSIS): ICD-10-CM

## 2021-11-11 DIAGNOSIS — N89.8 VAGINAL ITCHING: ICD-10-CM

## 2021-11-11 DIAGNOSIS — R30.0 DYSURIA: ICD-10-CM

## 2021-11-11 DIAGNOSIS — N39.0 RECURRENT UTI: ICD-10-CM

## 2021-11-11 DIAGNOSIS — B96.89 BV (BACTERIAL VAGINOSIS): ICD-10-CM

## 2021-11-11 DIAGNOSIS — N30.01 ACUTE CYSTITIS WITH HEMATURIA: ICD-10-CM

## 2021-11-11 LAB
CANDIDA DNA VAG QL PROBE+SIG AMP: NEGATIVE
G VAGINALIS DNA VAG QL PROBE+SIG AMP: POSITIVE
T VAGINALIS DNA VAG QL PROBE+SIG AMP: NEGATIVE

## 2021-11-11 RX ORDER — METRONIDAZOLE 7.5 MG/G
1 GEL VAGINAL
Qty: 5 EACH | Refills: 0 | Status: SHIPPED | OUTPATIENT
Start: 2021-11-11 | End: 2021-12-01

## 2021-11-11 RX ORDER — VITAMIN B COMPLEX
1000 TABLET ORAL DAILY
COMMUNITY

## 2021-11-11 RX ORDER — METRONIDAZOLE 500 MG/1
500 TABLET ORAL 2 TIMES DAILY
Qty: 14 TABLET | Refills: 0 | Status: SHIPPED | OUTPATIENT
Start: 2021-11-11 | End: 2021-11-11

## 2021-11-11 NOTE — TELEPHONE ENCOUNTER
Patient is concerned about some of the side effects of Flagyl that she read on line, specifically GI upset and cramping.     She is planning on visiting her daughter next week and states that she does not want to feel unwell while on vacation.     Patient was wondering if there was an alternative that could be prescribed.     Please contact patient at [phone]

## 2021-12-01 ENCOUNTER — OFFICE VISIT (OUTPATIENT)
Dept: MEDICAL GROUP | Facility: PHYSICIAN GROUP | Age: 66
End: 2021-12-01
Payer: MEDICARE

## 2021-12-01 VITALS
OXYGEN SATURATION: 96 % | HEART RATE: 83 BPM | HEIGHT: 62 IN | RESPIRATION RATE: 16 BRPM | DIASTOLIC BLOOD PRESSURE: 68 MMHG | SYSTOLIC BLOOD PRESSURE: 108 MMHG | BODY MASS INDEX: 25.25 KG/M2 | WEIGHT: 137.2 LBS | TEMPERATURE: 99 F

## 2021-12-01 DIAGNOSIS — Z23 NEED FOR VACCINATION: ICD-10-CM

## 2021-12-01 DIAGNOSIS — K25.7 CHRONIC GASTRIC ULCER WITHOUT HEMORRHAGE AND WITHOUT PERFORATION: ICD-10-CM

## 2021-12-01 DIAGNOSIS — G89.29 CHRONIC PAIN OF LEFT KNEE: ICD-10-CM

## 2021-12-01 DIAGNOSIS — N39.0 RECURRENT UTI: ICD-10-CM

## 2021-12-01 DIAGNOSIS — M25.562 CHRONIC PAIN OF LEFT KNEE: ICD-10-CM

## 2021-12-01 DIAGNOSIS — E78.00 ELEVATED LOW DENSITY LIPOPROTEIN (LDL) CHOLESTEROL LEVEL: ICD-10-CM

## 2021-12-01 DIAGNOSIS — Z00.00 ENCOUNTER FOR ANNUAL WELLNESS VISIT (AWV) IN MEDICARE PATIENT: ICD-10-CM

## 2021-12-01 DIAGNOSIS — M54.42 ACUTE LEFT-SIDED LOW BACK PAIN WITH LEFT-SIDED SCIATICA: ICD-10-CM

## 2021-12-01 PROBLEM — R10.2 PELVIC PRESSURE IN FEMALE: Status: RESOLVED | Noted: 2021-10-06 | Resolved: 2021-12-01

## 2021-12-01 PROBLEM — J34.89 SINUS PRESSURE: Status: RESOLVED | Noted: 2021-10-06 | Resolved: 2021-12-01

## 2021-12-01 PROCEDURE — G0008 ADMIN INFLUENZA VIRUS VAC: HCPCS | Performed by: NURSE PRACTITIONER

## 2021-12-01 PROCEDURE — G0438 PPPS, INITIAL VISIT: HCPCS | Performed by: NURSE PRACTITIONER

## 2021-12-01 PROCEDURE — 90662 IIV NO PRSV INCREASED AG IM: CPT | Performed by: NURSE PRACTITIONER

## 2021-12-01 ASSESSMENT — ENCOUNTER SYMPTOMS: GENERAL WELL-BEING: GOOD

## 2021-12-01 ASSESSMENT — FIBROSIS 4 INDEX: FIB4 SCORE: 0.89

## 2021-12-01 ASSESSMENT — PATIENT HEALTH QUESTIONNAIRE - PHQ9: CLINICAL INTERPRETATION OF PHQ2 SCORE: 0

## 2021-12-01 ASSESSMENT — ACTIVITIES OF DAILY LIVING (ADL): BATHING_REQUIRES_ASSISTANCE: 0

## 2021-12-01 NOTE — ASSESSMENT & PLAN NOTE
Chronic and stable condition  Was following with orthopedics Dr. Templeton  States no need for surgery, was provided a brace that she does not always use  Controlled with ibuprofen as needed for inflammation and pain  Continue with ibuprofen, brace and ice as needed

## 2021-12-01 NOTE — PROGRESS NOTES
Chief Complaint   Patient presents with   • Annual Exam     Medicare         HPI:  Nita is a 66 y.o. here for Medicare Annual Wellness Visit      Patient Active Problem List    Diagnosis Date Noted   • Elevated low density lipoprotein (LDL) cholesterol level 04/22/2021   • Acute left-sided low back pain with left-sided sciatica 03/16/2021   • Chronic pain of left knee 03/16/2021   • Chronic gastric ulcer without hemorrhage and without perforation 01/20/2021   • Recurrent UTI 08/30/2011       Current Outpatient Medications   Medication Sig Dispense Refill   • vitamin D3 (CHOLECALCIFEROL) 1000 Unit (25 mcg) Tab Take 1,000 Units by mouth every day.     • pantoprazole (PROTONIX) 20 MG tablet Take 1 tablet by mouth every day. 90 tablet 3   • Multiple Vitamins-Minerals (MULTIVITAMIN ADULT PO) Take 1 Tab by mouth every day.       No current facility-administered medications for this visit.        Patient is taking medications as noted in medication list.  Current supplements as per medication list.     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 Yes.    She  reports that she has never smoked. She has never used smokeless tobacco. She reports current alcohol use. She reports previous drug use.  Counseling given: Not Answered        DPA/Advanced directive: Patient does not have an Advanced Directive.  A packet and workshop information was given on Advanced Directives.    ROS:    Gait: Uses no assistive device   Ostomy: No   Other tubes: No   Amputations: No   Chronic oxygen use No   Last eye exam  - 12/2020  Wears hearing aids: No   : Denies any urinary leakage during the last 6 months    Screening:      Depression Screening    Little interest or pleasure in doing things?  0 - not at all  Feeling down, depressed, or  hopeless? 0 - not at all  Patient Health Questionnaire Score: 0    If depressive symptoms identified deferred to follow up visit unless specifically addressed in assessment and plan.    Interpretation of PHQ-9 Total Score   Score Severity   1-4 No Depression   5-9 Mild Depression   10-14 Moderate Depression   15-19 Moderately Severe Depression   20-27 Severe Depression    Screening for Cognitive Impairment    Three Minute Recall (captain, garden, picture)  2/3    Alen clock face with all 12 numbers and set the hands to show 5 past 8.  Yes    If cognitive concerns identified, deferred for follow up unless specifically addressed in assessment and plan.    Fall Risk Assessment    Has the patient had two or more falls in the last year or any fall with injury in the last year?  No  If fall risk identified, deferred for follow up unless specifically addressed in assessment and plan.    Safety Assessment    Throw rugs on floor.  Yes  Handrails on all stairs.  Yes  Good lighting in all hallways.  Yes  Difficulty hearing.  No  Patient counseled about all safety risks that were identified.    Functional Assessment ADLs    Are there any barriers preventing you from cooking for yourself or meeting nutritional needs?  No.    Are there any barriers preventing you from driving safely or obtaining transportation?  No.    Are there any barriers preventing you from using a telephone or calling for help?  No.    Are there any barriers preventing you from shopping?  No.    Are there any barriers preventing you from taking care of your own finances?  No.    Are there any barriers preventing you from managing your medications?  No.    Are there any barriers preventing you from showering, bathing or dressing yourself?  No.    Are you currently engaging in any exercise or physical activity?  Yes.     What is your perception of your health?  Good.    Health Maintenance Summary          Overdue - Annual Wellness Visit (Once) Overdue - never  done    No completion history exists for this topic.          Ordered - COLORECTAL CANCER SCREENING (COLONOSCOPY - Every 10 Years) Ordered on 4/22/2021    No completion history exists for this topic.          Ordered - BONE DENSITY (Every 5 Years) Ordered on 1/20/2021    No completion history exists for this topic.          Postponed - IMM ZOSTER VACCINES (1 of 2) Postponed until 2/17/2022 06/07/2011  Imm Admin: Varicella Vaccine Live          COVID-19 Vaccine (3 - Booster for Pfizer series) Next due on 2/28/2022 08/31/2021  Imm Admin: Pfizer SARS-CoV-2 Vaccine    08/04/2021  Imm Admin: Pfizer SARS-CoV-2 Vaccine          MAMMOGRAM (Yearly) Tentatively due on 4/12/2022 04/12/2021  MA-SCREENING MAMMO BILAT W/TOMOSYNTHESIS W/CAD          IMM DTaP/Tdap/Td Vaccine (3 - Td or Tdap) Next due on 12/25/2028 12/25/2018  Imm Admin: Dtap Vaccine    10/12/2010  Imm Admin: Tdap Vaccine    05/28/2004  Imm Admin: TD Vaccine          IMM PNEUMOCOCCAL VACCINE: 65+ Years (Series Information) Completed    11/19/2020  Imm Admin: Pneumococcal polysaccharide vaccine (PPSV-23)          IMM INFLUENZA (Series Information) Completed    12/01/2021  Imm Admin: Influenza Vaccine Adult HD    11/19/2020  Imm Admin: Influenza Vaccine Adult HD    11/07/2019  Imm Admin: Influenza Vaccine Quad Inj (Preserved)    10/24/2018  Imm Admin: Influenza Vaccine Quad Inj (Pf)    11/02/2017  Imm Admin: Influenza Vaccine Quad Inj (Pf)    Only the first 5 history entries have been loaded, but more history exists.          IMM HEP B VACCINE (Series Information) Aged Out    No completion history exists for this topic.          IMM MENINGOCOCCAL VACCINE (MCV4) (Series Information) Aged Out    No completion history exists for this topic.          Discontinued - PAP SMEAR  Discontinued    No completion history exists for this topic.          Discontinued - HEPATITIS C SCREENING  Discontinued    No completion history exists for this topic.           "      Patient Care Team:  JUAN J Montejo as PCP - General (Nurse Practitioner Primary Care)    Social History     Tobacco Use   • Smoking status: Never Smoker   • Smokeless tobacco: Never Used   Vaping Use   • Vaping Use: Never used   Substance Use Topics   • Alcohol use: Yes     Comment: occ   • Drug use: Not Currently     Family History   Problem Relation Age of Onset   • Psychiatric Illness Mother    • Cancer Father    • Heart Disease Father    • Cancer Sister    • Diabetes Son      She  has a past medical history of Allergic rhinitis (6/2/2005), Dental infection (1/20/2021), Dysuria (8/30/2011), Establishing care with new doctor, encounter for (1/20/2021), 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   History reviewed. No pertinent surgical history.        Exam:     /68 (BP Location: Left arm, Patient Position: Sitting, BP Cuff Size: Adult)   Pulse 83   Temp 37.2 °C (99 °F) (Temporal)   Resp 16   Ht 1.575 m (5' 2\")   Wt 62.2 kg (137 lb 3.2 oz)   SpO2 96%  Body mass index is 25.09 kg/m².    Hearing excellent.    Dentition good  Alert, oriented in no acute distress.  Eye contact is good, speech goal directed, affect calm      Assessment and Plan. The following treatment and monitoring plan is recommended:      Chronic gastric ulcer without hemorrhage and without perforation  Chronic and stable condition  Currently controlled with Protonix 20 mg daily  Was following with [facility], Dr. Jacobson  Continue with Protonix and lifestyle modifications    Chronic pain of left knee  Chronic and stable condition  Was following with orthopedics Dr. Templeton  States no need for surgery, was provided a brace that she does not always use  Controlled with ibuprofen " as needed for inflammation and pain  Continue with ibuprofen, brace and ice as needed    Elevated low density lipoprotein (LDL) cholesterol level  Chronic and stable condition  Patient currently working on lifestyle modification, diet and exercise    Recurrent UTI  Chronic and stable condition  Recently was treated for UTI, second 1 of this year  Was seen by urology with [facility] [place] roughly 6 to 7 years ago  We will continue to monitor    Services suggested: No services needed at this time  Health Care Screening recommendations as per orders if indicated.  Referrals offered: PT/OT/Nutrition counseling/Behavioral Health/Smoking cessation as per orders if indicated.    Discussion today about general wellness and lifestyle habits:    · Prevent falls and reduce trip hazards; Cautioned about securing or removing rugs.  · Have a working fire alarm and carbon monoxide detector;   · Engage in regular physical activity and social activities       Follow-up: Return in about 3 months (around 3/1/2022) for annnual physical exam.

## 2021-12-01 NOTE — ASSESSMENT & PLAN NOTE
Chronic and stable condition  Patient currently working on lifestyle modification, diet and exercise

## 2021-12-01 NOTE — ASSESSMENT & PLAN NOTE
Chronic and stable condition  Recently was treated for UTI, second 1 of this year  Was seen by urology with [facility] [place] roughly 6 to 7 years ago  We will continue to monitor

## 2021-12-01 NOTE — ASSESSMENT & PLAN NOTE
Chronic and stable condition  Currently controlled with Protonix 20 mg daily  Was following with [facility], Dr. Jacobson  Continue with Protonix and lifestyle modifications

## 2022-01-06 ENCOUNTER — TELEPHONE (OUTPATIENT)
Dept: MEDICAL GROUP | Facility: PHYSICIAN GROUP | Age: 67
End: 2022-01-06

## 2022-01-06 NOTE — TELEPHONE ENCOUNTER
----- Message from Siri Hernandez sent at 1/6/2022 11:58 AM PST -----  Regarding: FW: Perscription    ----- Message -----  From: Nita Sepulveda  Sent: 1/3/2022   8:32 AM PST  To: Javi All Mas  Subject: Perscription                                     Topic: Technical Issues.    Dr Kendall  I changed my perscription plan to [facility] and will move it from [facility] to [facility]. Im going to be doing mail-order and they need the written perscription.  It for my pantopraval 20 mg once daily. If its possible could you send that to me or let me know when I can pick it up. There aren't any refills left so I think this is a good time. Can you fill it for 90 days with how many refills you can. Please call me in regards to this. [phone].  Thank you  Nita Sepulveda

## 2022-01-11 DIAGNOSIS — K25.7 CHRONIC GASTRIC ULCER WITHOUT HEMORRHAGE AND WITHOUT PERFORATION: ICD-10-CM

## 2022-01-11 RX ORDER — PANTOPRAZOLE SODIUM 20 MG/1
20 TABLET, DELAYED RELEASE ORAL DAILY
Qty: 90 TABLET | Refills: 3 | Status: SHIPPED | OUTPATIENT
Start: 2022-01-11 | End: 2022-03-09

## 2022-01-11 NOTE — TELEPHONE ENCOUNTER
The printed copy prescription for Pantoprazole 20 MG was not accepted by [facility]. Patient is wondering if Dr. Kendall could send it electronically to them thank you    [facility]  [id number]  [place] [address]    Ph: [phone]

## 2022-03-01 ENCOUNTER — OFFICE VISIT (OUTPATIENT)
Dept: MEDICAL GROUP | Facility: PHYSICIAN GROUP | Age: 67
End: 2022-03-01
Payer: MEDICARE

## 2022-03-01 VITALS
SYSTOLIC BLOOD PRESSURE: 110 MMHG | DIASTOLIC BLOOD PRESSURE: 74 MMHG | WEIGHT: 143 LBS | TEMPERATURE: 98.8 F | HEIGHT: 62 IN | OXYGEN SATURATION: 95 % | BODY MASS INDEX: 26.31 KG/M2 | RESPIRATION RATE: 18 BRPM | HEART RATE: 82 BPM

## 2022-03-01 DIAGNOSIS — R09.81 SINUS CONGESTION: ICD-10-CM

## 2022-03-01 DIAGNOSIS — K64.8 OTHER HEMORRHOIDS: ICD-10-CM

## 2022-03-01 DIAGNOSIS — M79.601 RIGHT ARM PAIN: ICD-10-CM

## 2022-03-01 PROCEDURE — 99214 OFFICE O/P EST MOD 30 MIN: CPT | Performed by: NURSE PRACTITIONER

## 2022-03-01 RX ORDER — IBUPROFEN 800 MG/1
800 TABLET ORAL EVERY 8 HOURS PRN
Qty: 30 TABLET | Refills: 0 | Status: SHIPPED | OUTPATIENT
Start: 2022-03-01 | End: 2022-03-22

## 2022-03-01 RX ORDER — AMOXICILLIN 875 MG/1
875 TABLET, COATED ORAL 2 TIMES DAILY
Qty: 14 TABLET | Refills: 0 | Status: SHIPPED | OUTPATIENT
Start: 2022-03-01 | End: 2022-03-22

## 2022-03-01 ASSESSMENT — ENCOUNTER SYMPTOMS
SORE THROAT: 0
CONSTIPATION: 1
DIARRHEA: 0
CHILLS: 0
ABDOMINAL PAIN: 0
VOMITING: 0
FEVER: 0
FALLS: 0
WEIGHT LOSS: 0
NAUSEA: 0
NECK PAIN: 1
DIZZINESS: 0
BLOOD IN STOOL: 0
SHORTNESS OF BREATH: 0
HEADACHES: 0
SINUS PAIN: 1

## 2022-03-01 ASSESSMENT — FIBROSIS 4 INDEX: FIB4 SCORE: 0.89

## 2022-03-01 ASSESSMENT — PATIENT HEALTH QUESTIONNAIRE - PHQ9: CLINICAL INTERPRETATION OF PHQ2 SCORE: 0

## 2022-03-01 NOTE — ASSESSMENT & PLAN NOTE
Onset over the weekend  Pain to right arm at the top and down to fingers  Thinks she pinched a nerve  Was playing with grandson and had to push a large toy car that the battery  in from a field to the house, she was straining her neck while push car  Treatment- tylenol

## 2022-03-01 NOTE — ASSESSMENT & PLAN NOTE
Onset 3 weeks ago  Congestion, teeth hurt, pain under eyes and above eyes, right ear fullness   History of sinus infections  Treatment- saline nasal spray, tylenol, used mucinex but didn't note changes  Denies chills, fevers

## 2022-03-01 NOTE — ASSESSMENT & PLAN NOTE
Chronic and stable condition   Onset- 2-3 months  Not sure if it is fissure or hemorrhoid  Noticed bleeding on toilet paper,   Has used tucks, preparation H  Denies blood in stool, blood in toilet, constipation

## 2022-03-08 DIAGNOSIS — K25.7 CHRONIC GASTRIC ULCER WITHOUT HEMORRHAGE AND WITHOUT PERFORATION: ICD-10-CM

## 2022-03-09 RX ORDER — PANTOPRAZOLE SODIUM 20 MG/1
20 TABLET, DELAYED RELEASE ORAL DAILY
Qty: 90 TABLET | Refills: 3 | Status: SHIPPED | OUTPATIENT
Start: 2022-03-09 | End: 2022-06-13 | Stop reason: SDUPTHER

## 2022-04-26 ENCOUNTER — OFFICE VISIT (OUTPATIENT)
Dept: MEDICAL GROUP | Facility: PHYSICIAN GROUP | Age: 67
End: 2022-04-26
Payer: MEDICARE

## 2022-04-26 VITALS
DIASTOLIC BLOOD PRESSURE: 78 MMHG | TEMPERATURE: 99.2 F | OXYGEN SATURATION: 96 % | WEIGHT: 142 LBS | HEIGHT: 62 IN | RESPIRATION RATE: 16 BRPM | HEART RATE: 78 BPM | SYSTOLIC BLOOD PRESSURE: 132 MMHG | BODY MASS INDEX: 26.13 KG/M2

## 2022-04-26 DIAGNOSIS — Z78.0 POSTMENOPAUSAL STATUS (AGE-RELATED) (NATURAL): ICD-10-CM

## 2022-04-26 DIAGNOSIS — N95.1 MENOPAUSAL STATE: ICD-10-CM

## 2022-04-26 DIAGNOSIS — G89.29 CHRONIC PAIN OF LEFT KNEE: ICD-10-CM

## 2022-04-26 DIAGNOSIS — M25.562 CHRONIC PAIN OF LEFT KNEE: ICD-10-CM

## 2022-04-26 DIAGNOSIS — R20.0 NUMBNESS AND TINGLING OF RIGHT ARM: ICD-10-CM

## 2022-04-26 DIAGNOSIS — M54.42 ACUTE LEFT-SIDED LOW BACK PAIN WITH LEFT-SIDED SCIATICA: ICD-10-CM

## 2022-04-26 DIAGNOSIS — R20.2 NUMBNESS AND TINGLING OF RIGHT ARM: ICD-10-CM

## 2022-04-26 DIAGNOSIS — Z12.31 ENCOUNTER FOR SCREENING MAMMOGRAM FOR MALIGNANT NEOPLASM OF BREAST: ICD-10-CM

## 2022-04-26 DIAGNOSIS — M79.601 RIGHT ARM PAIN: ICD-10-CM

## 2022-04-26 PROCEDURE — 99214 OFFICE O/P EST MOD 30 MIN: CPT | Performed by: NURSE PRACTITIONER

## 2022-04-26 RX ORDER — CYCLOBENZAPRINE HYDROCHLORIDE 7.5 MG/1
7.5 TABLET, FILM COATED ORAL 3 TIMES DAILY PRN
Qty: 30 TABLET | Refills: 0 | Status: SHIPPED | OUTPATIENT
Start: 2022-04-26 | End: 2022-04-27

## 2022-04-26 RX ORDER — CYCLOBENZAPRINE HYDROCHLORIDE 7.5 MG/1
7.5 TABLET, FILM COATED ORAL 3 TIMES DAILY PRN
COMMUNITY
End: 2022-04-26 | Stop reason: SDUPTHER

## 2022-04-26 ASSESSMENT — FIBROSIS 4 INDEX: FIB4 SCORE: 0.89

## 2022-04-26 NOTE — ASSESSMENT & PLAN NOTE
Onset Friday night after hearing a pop  Pain to right shoulder down arm and notices numbness and tingling to left thumb  taking 3 200 mg ibuprofen occasionally   Took 3 600 mg yesterday at 1pm - helped relived  it a bit

## 2022-04-27 DIAGNOSIS — M54.42 ACUTE LEFT-SIDED LOW BACK PAIN WITH LEFT-SIDED SCIATICA: ICD-10-CM

## 2022-04-27 RX ORDER — CYCLOBENZAPRINE HCL 5 MG
5 TABLET ORAL 3 TIMES DAILY PRN
Qty: 30 TABLET | Refills: 0 | Status: SHIPPED | OUTPATIENT
Start: 2022-04-27

## 2022-04-27 NOTE — ASSESSMENT & PLAN NOTE
States that she was sleeping her night when she rolled over and felt a pop in her shoulder   Noted right shoulder pain with numbness at index finger and thumb.    Could possibly be radial nerve or median nerve issues.    Patient denies numbness the back of hand.    She does have decreased range of motion to the right upper extremity as well as a positive Apley test and abduction of right upper extremity.    All pain is located in that right deltoid.  This could be related to her cervical radiculopathy we will get a cervical spine as well as right shoulder x-ray  Discussed with patient using heat and ice packs as well as Voltaren gel.  Patient will follow back up in 2 weeks to reevaluate medication and imaging

## 2022-04-27 NOTE — PROGRESS NOTES
CC:  Chief Complaint   Patient presents with   • Nerve Pain     X3days pain from neck to shoulder to finger tips. Right side   • Results     X-ray Knee       HISTORY OF PRESENT ILLNESS: Patient is a 66 y.o. female established patient presenting nerve pain results of her x-ray    1. Numbness and tingling of right arm  Chronic and exacerbated condition  Patient complained of this back in March  Does not seem to be changing  Noticed it mostly to her thumb and index finger  Also has pain from her neck down to her deltoid    2. Postmenopausal status (age-related) (natural)  Chronic and stable condition    3. Menopausal state  Chronic and stable condition    4. Encounter for screening mammogram for malignant neoplasm of breast  Annual exam    5. Acute left-sided low back pain with left-sided sciatica  Chronic and stable condition         Allergies:Hydrocodone-acetaminophen    Current Outpatient Medications   Medication Sig Dispense Refill   • diclofenac sodium (VOLTAREN) 1 % Gel Apply 2 g topically 4 times a day as needed (pain to right shoulder). 150 g 0   • cyclobenzaprine (FEXMID) 7.5 MG tablet Take 1 Tablet by mouth 3 times a day as needed for Muscle Spasms. 30 Tablet 0   • pantoprazole (PROTONIX) 20 MG tablet TAKE 1 TABLET BY MOUTH EVERY DAY 90 Tablet 3   • vitamin D3 (CHOLECALCIFEROL) 1000 Unit (25 mcg) Tab Take 1,000 Units by mouth every day.     • Multiple Vitamins-Minerals (MULTIVITAMIN ADULT PO) Take 1 Tab by mouth every day.       No current facility-administered medications for this visit.       Social History     Tobacco Use   • Smoking status: Never Smoker   • Smokeless tobacco: Never Used   Vaping Use   • Vaping Use: Never used   Substance Use Topics   • Alcohol use: Yes     Comment: occ   • Drug use: Not Currently     Social History     Social History Narrative   • Not on file       Family History   Problem Relation Age of Onset   • Psychiatric Illness Mother    • Cancer Father    • Heart Disease Father   "  • Cancer Sister    • Diabetes Son         ROS   See HPI    - NOTE: All other systems reviewed and are negative, except as in HPI.      Exam:    /78   Pulse 78   Temp 37.3 °C (99.2 °F) (Temporal)   Resp 16   Ht 1.575 m (5' 2\")   Wt 64.4 kg (142 lb)   SpO2 96%  Body mass index is 25.97 kg/m².    General: Alert, pleasant, NAD  EYES:   PERRL, EOMI, no icterus or pallor.  Conjunctivae and lids normal.   HENT:  Normocephalic.  External ears normal.   Respiratory: Normal respiratory effort.    Musculoskeletal: Gait is normal.  Moves all extremities well.    Extremities: No clubbing, cyanosis or edema noted.  Decreased range of motion to right upper extremity.  Has pain to deltoid with Apley exam as well as empty can and abduction of RUE  Neurological: No tremors, sensation grossly intact, tone/strength normal, gait is normal, CN2-12 intact.  Psych:  Affect/mood is normal, judgement is good, memory is intact, grooming is appropriate.      LABS: 3/21/2022: Results reviewed and discussed with the patient, questions answered.    Assessment/Plan:  Numbness and tingling of right arm  Onset Friday night after hearing a pop  Pain to right shoulder down arm and notices numbness and tingling to left thumb  taking 3 200 mg ibuprofen occasionally   Took 3 600 mg yesterday at 1pm - helped relived  it a bit           Chronic pain of left knee  Patient was seen by Dr. Templeton and had x-ray completed.  This it was noted that there was degenerative narrowing in the medial compartment with subchondral sclerosis and early osteophyte formation.  It was not noted to have bone to bone.  They did feel that they were reasonably well mineralized with no worrisome bone lesions.    Right arm pain  States that she was sleeping her night when she rolled over and felt a pop in her shoulder   Noted right shoulder pain with numbness at index finger and thumb.    Could possibly be radial nerve or median nerve issues.    Patient denies " numbness the back of hand.    She does have decreased range of motion to the right upper extremity as well as a positive Apley test and abduction of right upper extremity.    All pain is located in that right deltoid.  This could be related to her cervical radiculopathy we will get a cervical spine as well as right shoulder x-ray  Discussed with patient using heat and ice packs as well as Voltaren gel.  Patient will follow back up in 2 weeks to reevaluate medication and imaging       Discussed with patient possible alternative diagnoses, patient is to take all medications as prescribed.     If symptoms persist FU w/PCP, if symptoms worsen go to emergency room.     If experiencing any side effects from prescribed medications reports to the office immediately or go to emergency room.    Reviewed indication, dosage, usage and potential adverse effects of prescribed medications.     Reviewed risks and benefits of treatment plan. Patient verbalizes understanding of all instruction and verbally agrees to plan.      Return in about 2 weeks (around 5/10/2022).     Please note that this dictation was created using voice recognition software. I have made every reasonable attempt to correct obvious errors, but I expect that there are errors of grammar and possibly content that I did not discover before finalizing the note.

## 2022-04-27 NOTE — ASSESSMENT & PLAN NOTE
Patient was seen by Dr. Templeton and had x-ray completed.  This it was noted that there was degenerative narrowing in the medial compartment with subchondral sclerosis and early osteophyte formation.  It was not noted to have bone to bone.  They did feel that they were reasonably well mineralized with no worrisome bone lesions.

## 2022-05-03 ENCOUNTER — HOSPITAL ENCOUNTER (OUTPATIENT)
Dept: RADIOLOGY | Facility: MEDICAL CENTER | Age: 67
End: 2022-05-03
Attending: NURSE PRACTITIONER
Payer: MEDICARE

## 2022-05-03 DIAGNOSIS — R20.2 NUMBNESS AND TINGLING OF RIGHT ARM: ICD-10-CM

## 2022-05-03 DIAGNOSIS — R20.0 NUMBNESS AND TINGLING OF RIGHT ARM: ICD-10-CM

## 2022-05-03 DIAGNOSIS — Z12.31 ENCOUNTER FOR SCREENING MAMMOGRAM FOR MALIGNANT NEOPLASM OF BREAST: ICD-10-CM

## 2022-05-03 PROCEDURE — 77063 BREAST TOMOSYNTHESIS BI: CPT

## 2022-05-03 PROCEDURE — 73030 X-RAY EXAM OF SHOULDER: CPT | Mod: RT

## 2022-05-03 PROCEDURE — 72040 X-RAY EXAM NECK SPINE 2-3 VW: CPT

## 2022-05-17 ENCOUNTER — OFFICE VISIT (OUTPATIENT)
Dept: MEDICAL GROUP | Facility: PHYSICIAN GROUP | Age: 67
End: 2022-05-17
Payer: MEDICARE

## 2022-05-17 VITALS
DIASTOLIC BLOOD PRESSURE: 84 MMHG | BODY MASS INDEX: 26.46 KG/M2 | WEIGHT: 143.8 LBS | SYSTOLIC BLOOD PRESSURE: 122 MMHG | TEMPERATURE: 97.9 F | HEART RATE: 85 BPM | RESPIRATION RATE: 16 BRPM | OXYGEN SATURATION: 96 % | HEIGHT: 62 IN

## 2022-05-17 DIAGNOSIS — M25.562 CHRONIC PAIN OF LEFT KNEE: ICD-10-CM

## 2022-05-17 DIAGNOSIS — Z12.11 SCREENING FOR COLORECTAL CANCER: ICD-10-CM

## 2022-05-17 DIAGNOSIS — M79.601 RIGHT ARM PAIN: ICD-10-CM

## 2022-05-17 DIAGNOSIS — G89.29 CHRONIC PAIN OF LEFT KNEE: ICD-10-CM

## 2022-05-17 DIAGNOSIS — M43.02 CERVICAL SPONDYLOLYSIS: ICD-10-CM

## 2022-05-17 DIAGNOSIS — R20.0 NUMBNESS AND TINGLING OF RIGHT ARM: ICD-10-CM

## 2022-05-17 DIAGNOSIS — M47.12 CERVICAL SPONDYLOSIS WITH MYELOPATHY AND RADICULOPATHY: ICD-10-CM

## 2022-05-17 DIAGNOSIS — Z12.12 SCREENING FOR COLORECTAL CANCER: ICD-10-CM

## 2022-05-17 DIAGNOSIS — M54.2 NECK PAIN: ICD-10-CM

## 2022-05-17 DIAGNOSIS — M47.22 CERVICAL SPONDYLOSIS WITH MYELOPATHY AND RADICULOPATHY: ICD-10-CM

## 2022-05-17 DIAGNOSIS — R20.2 NUMBNESS AND TINGLING OF RIGHT ARM: ICD-10-CM

## 2022-05-17 DIAGNOSIS — R09.81 SINUS CONGESTION: ICD-10-CM

## 2022-05-17 PROCEDURE — 99214 OFFICE O/P EST MOD 30 MIN: CPT | Performed by: NURSE PRACTITIONER

## 2022-05-17 ASSESSMENT — FIBROSIS 4 INDEX: FIB4 SCORE: 0.89

## 2022-05-18 NOTE — PROGRESS NOTES
CC:  Chief Complaint   Patient presents with   • Results     Xray        HISTORY OF PRESENT ILLNESS: Patient is a 66 y.o. female established patient presenting for follow-up x-rays    1. Neck pain  Chronic and stable condition  Completed imaging  States it is improving    2. Cervical spondylolysis  Chronic and stable condition  X-rays completed showing cervical spondylosis as well as degenerative disc disease    3. Cervical spondylosis with myelopathy and radiculopathy  Chronic and stable condition  X-rays completed showing cervical spondylosis as well as degenerative disc disease    4. Screening for colorectal cancer  Needs colonoscopy    5. Right arm pain  Chronic and stable condition  Completed imaging  States it is improving  Would like to hold off on any type of physical therapy or referral at this time    6. Sinus congestion  Improved    7. Numbness and tingling of right arm  Chronic and stable condition  Completed imaging  States it is improving  Would like to hold off on any type of physical therapy or referral at this time      8. Chronic pain of left knee  Chronic and stable condition   plans follow-up with Dr. Templeton for injections         Allergies:Hydrocodone-acetaminophen    Current Outpatient Medications   Medication Sig Dispense Refill   • cyclobenzaprine (FLEXERIL) 5 mg tablet Take 1 Tablet by mouth 3 times a day as needed for Muscle Spasms. 30 Tablet 0   • pantoprazole (PROTONIX) 20 MG tablet TAKE 1 TABLET BY MOUTH EVERY DAY 90 Tablet 3   • vitamin D3 (CHOLECALCIFEROL) 1000 Unit (25 mcg) Tab Take 1,000 Units by mouth every day.     • Multiple Vitamins-Minerals (MULTIVITAMIN ADULT PO) Take 1 Tab by mouth every day.       No current facility-administered medications for this visit.       Social History     Tobacco Use   • Smoking status: Never Smoker   • Smokeless tobacco: Never Used   Vaping Use   • Vaping Use: Never used   Substance Use Topics   • Alcohol use: Yes     Comment: occ   • Drug use: Not  "Currently     Social History     Social History Narrative   • Not on file       Family History   Problem Relation Age of Onset   • Psychiatric Illness Mother    • Cancer Father    • Heart Disease Father    • Cancer Sister         breast   • Diabetes Son         ROS   See HPI      - NOTE: All other systems reviewed and are negative, except as in HPI.      Exam:    /84   Pulse 85   Temp 36.6 °C (97.9 °F) (Temporal)   Resp 16   Ht 1.575 m (5' 2\")   Wt 65.2 kg (143 lb 12.8 oz)   SpO2 96%  Body mass index is 26.3 kg/m².    General: Alert, pleasant, NAD  EYES:   PERRL, EOMI, no icterus or pallor.  Conjunctivae and lids normal.   HENT:  Normocephalic.  External ears normal.   Respiratory: Normal respiratory effort.    Skin: Warm, dry, no rashes.  Musculoskeletal: Gait is normal.  Moves all extremities well.    Extremities: No clubbing, cyanosis or edema noted.   Neurological: No tremors, sensation grossly intact, tone/strength normal, gait is normal, CN2-12 intact.  Psych:  Affect/mood is normal, judgement is good, memory is intact, grooming is appropriate.      LABS: 5/3/2022: Results reviewed and discussed with the patient, questions answered.    Assessment/Plan:  Right arm pain  Seems to have improved    Sinus congestion  improved  Feeling allergies are affecting her   Worse at night  Using claritin    Numbness and tingling of right arm  improved  Cervical Xray compelted with DD and spondylosis of cervical spine    Chronic pain of left knee  Follows with ortho      1. Neck pain  Referral to Chiropractic   2. Cervical spondylolysis     3. Cervical spondylosis with myelopathy and radiculopathy  Referral to Chiropractic   4. Screening for colorectal cancer  Referral to GI for Colonoscopy   5. Right arm pain     6. Sinus congestion     7. Numbness and tingling of right arm     8. Chronic pain of left knee       Discussed with patient possible alternative diagnoses, patient is to take all medications as " prescribed.     If symptoms persist FU w/PCP, if symptoms worsen go to emergency room.     If experiencing any side effects from prescribed medications reports to the office immediately or go to emergency room.    Reviewed indication, dosage, usage and potential adverse effects of prescribed medications.     Reviewed risks and benefits of treatment plan. Patient verbalizes understanding of all instruction and verbally agrees to plan.      Return for As needed.     Please note that this dictation was created using voice recognition software. I have made every reasonable attempt to correct obvious errors, but I expect that there are errors of grammar and possibly content that I did not discover before finalizing the note.

## 2022-05-26 ENCOUNTER — APPOINTMENT (OUTPATIENT)
Dept: RADIOLOGY | Facility: MEDICAL CENTER | Age: 67
End: 2022-05-26
Attending: NURSE PRACTITIONER
Payer: MEDICARE

## 2022-06-13 DIAGNOSIS — K25.7 CHRONIC GASTRIC ULCER WITHOUT HEMORRHAGE AND WITHOUT PERFORATION: ICD-10-CM

## 2022-06-13 RX ORDER — PANTOPRAZOLE SODIUM 20 MG/1
20 TABLET, DELAYED RELEASE ORAL DAILY
Qty: 90 TABLET | Refills: 3 | Status: SHIPPED | OUTPATIENT
Start: 2022-06-13 | End: 2022-09-01 | Stop reason: SDUPTHER

## 2022-09-01 DIAGNOSIS — K25.7 CHRONIC GASTRIC ULCER WITHOUT HEMORRHAGE AND WITHOUT PERFORATION: ICD-10-CM

## 2022-09-01 RX ORDER — PANTOPRAZOLE SODIUM 20 MG/1
20 TABLET, DELAYED RELEASE ORAL DAILY
Qty: 90 TABLET | Refills: 3 | Status: SHIPPED | OUTPATIENT
Start: 2022-09-01 | End: 2023-01-05 | Stop reason: SDUPTHER

## 2022-09-06 ENCOUNTER — OFFICE VISIT (OUTPATIENT)
Dept: MEDICAL GROUP | Facility: PHYSICIAN GROUP | Age: 67
End: 2022-09-06
Payer: MEDICARE

## 2022-09-06 VITALS
WEIGHT: 144.4 LBS | SYSTOLIC BLOOD PRESSURE: 112 MMHG | OXYGEN SATURATION: 97 % | HEIGHT: 62 IN | DIASTOLIC BLOOD PRESSURE: 78 MMHG | RESPIRATION RATE: 12 BRPM | BODY MASS INDEX: 26.57 KG/M2 | HEART RATE: 78 BPM | TEMPERATURE: 98.5 F

## 2022-09-06 DIAGNOSIS — R09.81 SINUS CONGESTION: ICD-10-CM

## 2022-09-06 PROCEDURE — 99213 OFFICE O/P EST LOW 20 MIN: CPT | Performed by: NURSE PRACTITIONER

## 2022-09-06 ASSESSMENT — FIBROSIS 4 INDEX: FIB4 SCORE: 0.89

## 2022-09-06 NOTE — PATIENT INSTRUCTIONS
Discussed supportive care in detail:   Rest  Increase fluid intake, add lemon and honey   Take OTC vit C and Zinc.   Incorporate Nasal irrigation or a Neti-pot (if sinuses involved).   Use humidifier in room or hot shower/bath.   Vicks Vapor rub steam anthony    Meds:  May use any combination of the following over-the-counter medications per 's instructions:    Nasal congestion/runny nose  - nasal saline sprays (ocean spray)  - nasal steroid sprays (e.g. Flonase, Nasacort)  - oral daily antihistamine (e.g. Claritin, Zyrtec, Allegra)  - oral decongestant (e.g. Sudafed)    Pain reliever, fever reducer  - oral pain reliever (e.g. Tylenol)  - oral anti-inflammatory/pain reliever (NSAID) (e.g. Motrin, Advil)- as directed on back of box    Cough  - as an expectorant/mucus reliever (Mucinex, Robitussin, etc).   - cough suppressant - (Tessalon Perls Robitussin, Delsym)  -as well as OTC lozenges for cough (Ricola, Halls etc).    Sore throat  Cepacol lozenges  Throat gargles with saline solution:  To mix your own solution:   --Add distilled water to the bottle's fill line at 240 ml = 8 fl oz  --Add 1/2 teaspoon of salt  --Warm in a microwave by 5 second increments.  --Test on your wrist for lukewarm temperature.

## 2022-09-07 NOTE — PROGRESS NOTES
CC:  Chief Complaint   Patient presents with    Nasal Congestion     X4days, slight cough, Pos sinus infection, sore thraot        HISTORY OF PRESENT ILLNESS: Patient is a 66 y.o. female established patient presenting for    Sinus congestion  Acute uncomplicated condition.  Patient states that roughly 4 days ago she started to notice congestion, runny nose, itchy eyes, cough, headache behind her right eye, hoarse voice.  Patient states that feel that she is having a sinus infection which she gets by annually.  She did try [place] pot, Annette-[place], Ocean Spray nasal spray saline, Mucinex with some noted improvement.  She denies shortness of breath, dysphagia, fevers, chills, nausea, vomiting.       Allergies:Hydrocodone-acetaminophen    Current Outpatient Medications   Medication Sig Dispense Refill    pantoprazole (PROTONIX) 20 MG tablet Take 1 Tablet by mouth every day. 90 Tablet 3    cyclobenzaprine (FLEXERIL) 5 mg tablet Take 1 Tablet by mouth 3 times a day as needed for Muscle Spasms. 30 Tablet 0    Multiple Vitamins-Minerals (MULTIVITAMIN ADULT PO) Take 1 Tab by mouth every day.      vitamin D3 (CHOLECALCIFEROL) 1000 Unit (25 mcg) Tab Take 1,000 Units by mouth every day. (Patient not taking: Reported on 9/6/2022)       No current facility-administered medications for this visit.     Past Medical History:   Diagnosis Date    Allergic rhinitis 6/2/2005    Dental infection 1/20/2021    Dysuria 8/30/2011    Establishing care with new doctor, encounter for 1/20/2021    Hemangioma of other sites 4/26/2010    Herpes zoster without complication 1/20/2021    Iron deficiency anemia 1/20/2011    Nocturia 8/30/2011    Pelvic pressure in female 10/6/2021    Sinus pressure 10/6/2021    Urgency of urination 8/30/2011    Vaginal itching 9/17/2021    Vitiligo 5/28/2004     History reviewed. No pertinent surgical history.  Social History     Tobacco Use    Smoking status: Never    Smokeless tobacco: Never   Vaping Use    Vaping  "Use: Never used   Substance Use Topics    Alcohol use: Yes     Comment: occ    Drug use: Not Currently     Social History     Social History Narrative    Not on file       Family History   Problem Relation Age of Onset    Psychiatric Illness Mother     Cancer Father     Heart Disease Father     Cancer Sister         breast    Diabetes Son         ROS  See HPI      - NOTE: All other systems reviewed and are negative, except as in HPI.      Exam:    /78 (BP Location: Left arm, Patient Position: Sitting, BP Cuff Size: Adult)   Pulse 78   Temp 36.9 °C (98.5 °F) (Temporal)   Resp 12   Ht 1.575 m (5' 2\")   Wt 65.5 kg (144 lb 6.4 oz)   SpO2 97%  Body mass index is 26.41 kg/m².    General: Alert, pleasant, NAD  EYES:   PERRL, EOMI, no icterus or pallor.  Conjunctivae and lids normal.   HENT:  Normocephalic.  External ears normal. Tympanic membranes pearly, opaque.  No nasal drainage present.  Oropharynx non-erythematous, mucous membranes moist.  Neck supple.   No thyromegaly or masses palpated. No cervical or supraclavicular lymphadenopathy.  Heart: Regular rate and rhythm.  S1 and S2 normal.  No murmurs appreciated.  Respiratory: Normal respiratory effort.  Clear to auscultation bilaterally.  Skin: Warm, dry, no rashes.  Musculoskeletal: Gait is normal.  Moves all extremities well.    Extremities: No clubbing, cyanosis or edema noted.   Neurological: No tremors, sensation grossly intact, tone/strength normal, gait is normal.  Psych:  Affect/mood is normal, judgement is good, memory is intact, grooming is appropriate.      Assessment/Plan:    1. Sinus congestion  Discussed with patient supportive care.  Provided patient with supportive care instructions in her AVS  Patient to come back to the office on Tuesday for worsening conditions if not sooner    Discussed with patient possible alternative diagnoses, patient is to take all medications as prescribed.     If symptoms persist FU w/PCP, if symptoms worsen go to " emergency room.     If experiencing any side effects from prescribed medications reports to the office immediately or go to emergency room.    Reviewed indication, dosage, usage and potential adverse effects of prescribed medications.     Reviewed risks and benefits of treatment plan. Patient verbalizes understanding of all instruction and verbally agrees to plan.      Return in about 1 week (around 9/13/2022) for follow up on congestion.      Please note that this dictation was created using voice recognition software. I have made every reasonable attempt to correct obvious errors, but I expect that there are errors of grammar and possibly content that I did not discover before finalizing the note.

## 2022-09-07 NOTE — ASSESSMENT & PLAN NOTE
Acute uncomplicated condition.  Patient states that roughly 4 days ago she started to notice congestion, runny nose, itchy eyes, cough, headache behind her right eye, hoarse voice.  Patient states that feel that she is having a sinus infection which she gets by annually.  She did try [place] pot, Annette-[place], Ocean Spray nasal spray saline, Mucinex with some noted improvement.  She denies shortness of breath, dysphagia, fevers, chills, nausea, vomiting.

## 2022-09-13 ENCOUNTER — OFFICE VISIT (OUTPATIENT)
Dept: MEDICAL GROUP | Facility: PHYSICIAN GROUP | Age: 67
End: 2022-09-13
Payer: MEDICARE

## 2022-09-13 VITALS
SYSTOLIC BLOOD PRESSURE: 136 MMHG | TEMPERATURE: 98.8 F | HEIGHT: 62 IN | HEART RATE: 74 BPM | WEIGHT: 144.7 LBS | OXYGEN SATURATION: 97 % | BODY MASS INDEX: 26.63 KG/M2 | DIASTOLIC BLOOD PRESSURE: 78 MMHG | RESPIRATION RATE: 17 BRPM

## 2022-09-13 DIAGNOSIS — R09.81 SINUS CONGESTION: ICD-10-CM

## 2022-09-13 DIAGNOSIS — J01.00 ACUTE NON-RECURRENT MAXILLARY SINUSITIS: ICD-10-CM

## 2022-09-13 PROCEDURE — 99214 OFFICE O/P EST MOD 30 MIN: CPT | Performed by: NURSE PRACTITIONER

## 2022-09-13 RX ORDER — AMOXICILLIN AND CLAVULANATE POTASSIUM 875; 125 MG/1; MG/1
1 TABLET, FILM COATED ORAL 2 TIMES DAILY
Qty: 14 TABLET | Refills: 0 | Status: SHIPPED | OUTPATIENT
Start: 2022-09-13 | End: 2022-11-21

## 2022-09-13 ASSESSMENT — FIBROSIS 4 INDEX: FIB4 SCORE: 0.89

## 2022-09-13 NOTE — PROGRESS NOTES
CC:  Chief Complaint   Patient presents with    Follow-Up     Sinus congestion       HISTORY OF PRESENT ILLNESS: Patient is a 66 y.o. female established patient presenting for follow up on nasal congestion    Sinus congestion  Acute and uncomplicated condition   Continues to have sinus pressure and congestion  She has been working on her supportive care  Using a humidifier  Waking up in middle of night with sever nasal drainage  Denies chills, fevers, n/v         Allergies:Hydrocodone-acetaminophen    Current Outpatient Medications   Medication Sig Dispense Refill    amoxicillin-clavulanate (AUGMENTIN) 875-125 MG Tab Take 1 Tablet by mouth 2 times a day. 14 Tablet 0    pantoprazole (PROTONIX) 20 MG tablet Take 1 Tablet by mouth every day. 90 Tablet 3    cyclobenzaprine (FLEXERIL) 5 mg tablet Take 1 Tablet by mouth 3 times a day as needed for Muscle Spasms. 30 Tablet 0    vitamin D3 (CHOLECALCIFEROL) 1000 Unit (25 mcg) Tab Take 1,000 Units by mouth every day.      Multiple Vitamins-Minerals (MULTIVITAMIN ADULT PO) Take 1 Tab by mouth every day. (Patient not taking: Reported on 9/13/2022)       No current facility-administered medications for this visit.     Past Medical History:   Diagnosis Date    Allergic rhinitis 6/2/2005    Dental infection 1/20/2021    Dysuria 8/30/2011    Establishing care with new doctor, encounter for 1/20/2021    Hemangioma of other sites 4/26/2010    Herpes zoster without complication 1/20/2021    Iron deficiency anemia 1/20/2011    Nocturia 8/30/2011    Pelvic pressure in female 10/6/2021    Sinus pressure 10/6/2021    Urgency of urination 8/30/2011    Vaginal itching 9/17/2021    Vitiligo 5/28/2004     History reviewed. No pertinent surgical history.  Social History     Tobacco Use    Smoking status: Never    Smokeless tobacco: Never   Vaping Use    Vaping Use: Never used   Substance Use Topics    Alcohol use: Yes     Comment: occ    Drug use: Not Currently     Social History     Social  "History Narrative    Not on file       Family History   Problem Relation Age of Onset    Psychiatric Illness Mother     Cancer Father     Heart Disease Father     Cancer Sister         breast    Diabetes Son         ROS    See HPI      - NOTE: All other systems reviewed and are negative, except as in HPI.      Exam:    /78 (BP Location: Left arm, Patient Position: Sitting, BP Cuff Size: Adult)   Pulse 74   Temp 37.1 °C (98.8 °F) (Temporal)   Resp 17   Ht 1.575 m (5' 2\")   Wt 65.6 kg (144 lb 11.2 oz)   SpO2 97%  Body mass index is 26.47 kg/m².    General: Alert, pleasant, NAD  EYES:   PERRL, EOMI, no icterus or pallor.  Conjunctivae and lids normal.   HENT:  Normocephalic.  External ears normal. Tympanic membranes pearly, opaque.  No nasal drainage present.  Oropharynx non-erythematous, mucous membranes moist.  Neck supple.   No thyromegaly or masses palpated. No cervical or supraclavicular lymphadenopathy.  Heart: Regular rate and rhythm.  S1 and S2 normal.  No murmurs appreciated.  Respiratory: Normal respiratory effort.  Clear to auscultation bilaterally.  Skin: Warm, dry, no rashes.  Musculoskeletal: Gait is normal.  Moves all extremities well.    Extremities: No clubbing, cyanosis or edema noted.   Neurological: No tremors, sensation grossly intact, tone/strength normal, gait is normal.  Psych:  Affect/mood is normal, judgement is good, memory is intact, grooming is appropriate.    Assessment/Plan:    1. Acute non-recurrent maxillary sinusitis  - amoxicillin-clavulanate (AUGMENTIN) 875-125 MG Tab; Take 1 Tablet by mouth 2 times a day.  Dispense: 14 Tablet; Refill: 0       Discussed with patient possible alternative diagnoses, patient is to take all medications as prescribed.     If symptoms persist FU w/PCP, if symptoms worsen go to emergency room.     If experiencing any side effects from prescribed medications reports to the office immediately or go to emergency room.    Reviewed indication, " dosage, usage and potential adverse effects of prescribed medications.     Reviewed risks and benefits of treatment plan. Patient verbalizes understanding of all instruction and verbally agrees to plan.      Return in about 1 week (around 9/20/2022) for for worsening symptoms.      Please note that this dictation was created using voice recognition software. I have made every reasonable attempt to correct obvious errors, but I expect that there are errors of grammar and possibly content that I did not discover before finalizing the note.

## 2022-09-13 NOTE — ASSESSMENT & PLAN NOTE
Acute and uncomplicated condition   Continues to have sinus pressure and congestion  She has been working on her supportive care  Using a humidifier  Waking up in middle of night with sever nasal drainage  Denies chills, fevers, n/v

## 2022-11-09 ENCOUNTER — PATIENT MESSAGE (OUTPATIENT)
Dept: HEALTH INFORMATION MANAGEMENT | Facility: OTHER | Age: 67
End: 2022-11-09

## 2022-11-21 ENCOUNTER — OFFICE VISIT (OUTPATIENT)
Dept: MEDICAL GROUP | Facility: PHYSICIAN GROUP | Age: 67
End: 2022-11-21
Payer: MEDICARE

## 2022-11-21 ENCOUNTER — HOSPITAL ENCOUNTER (OUTPATIENT)
Facility: MEDICAL CENTER | Age: 67
End: 2022-11-21
Attending: PHYSICIAN ASSISTANT
Payer: MEDICARE

## 2022-11-21 VITALS
HEART RATE: 75 BPM | HEIGHT: 62 IN | WEIGHT: 147.6 LBS | BODY MASS INDEX: 27.16 KG/M2 | TEMPERATURE: 98.4 F | SYSTOLIC BLOOD PRESSURE: 124 MMHG | DIASTOLIC BLOOD PRESSURE: 72 MMHG | RESPIRATION RATE: 16 BRPM | OXYGEN SATURATION: 98 %

## 2022-11-21 DIAGNOSIS — R10.9 FLANK PAIN: ICD-10-CM

## 2022-11-21 DIAGNOSIS — R10.31 RIGHT LOWER QUADRANT ABDOMINAL PAIN: ICD-10-CM

## 2022-11-21 LAB
APPEARANCE UR: CLEAR
BILIRUB UR STRIP-MCNC: NEGATIVE MG/DL
COLOR UR AUTO: YELLOW
GLUCOSE UR STRIP.AUTO-MCNC: NEGATIVE MG/DL
KETONES UR STRIP.AUTO-MCNC: NEGATIVE MG/DL
LEUKOCYTE ESTERASE UR QL STRIP.AUTO: NEGATIVE
NITRITE UR QL STRIP.AUTO: NEGATIVE
PH UR STRIP.AUTO: 7 [PH] (ref 5–8)
PROT UR QL STRIP: NEGATIVE MG/DL
RBC UR QL AUTO: NEGATIVE
SP GR UR STRIP.AUTO: 1.01
UROBILINOGEN UR STRIP-MCNC: 0.2 MG/DL

## 2022-11-21 PROCEDURE — 99213 OFFICE O/P EST LOW 20 MIN: CPT | Performed by: PHYSICIAN ASSISTANT

## 2022-11-21 PROCEDURE — 87086 URINE CULTURE/COLONY COUNT: CPT

## 2022-11-21 PROCEDURE — 81002 URINALYSIS NONAUTO W/O SCOPE: CPT | Performed by: PHYSICIAN ASSISTANT

## 2022-11-21 ASSESSMENT — FIBROSIS 4 INDEX: FIB4 SCORE: 0.9

## 2022-11-21 NOTE — PROGRESS NOTES
CC:  Chief Complaint   Patient presents with    Hip Pain     R hip pain since Saturday, but also is having urinary frequency and stinging, possible uti       HISTORY OF PRESENT ILLNESS: Patient is a 67 y.o. female established patient presenting with issue below.  Pt having discomfrot in R flank, abdomen and hip. Started 3 days ago. Describes as waxing/waning, 8/10 severity at its worst, and feels like pressure. Having urinary frequency and dysuria. No diarrhea or constipation. No fevers, chills. Has blood in stool from know hemorrhoids.    No problem-specific Assessment & Plan notes found for this encounter.      Patient Active Problem List    Diagnosis Date Noted    Numbness and tingling of right arm 04/26/2022    Right arm pain 03/01/2022    Other hemorrhoids 03/01/2022    Sinus congestion 03/01/2022    Elevated low density lipoprotein (LDL) cholesterol level 04/22/2021    Acute left-sided low back pain with left-sided sciatica 03/16/2021    Chronic pain of left knee 03/16/2021    Chronic gastric ulcer without hemorrhage and without perforation 01/20/2021    Recurrent UTI 08/30/2011      Allergies:Hydrocodone-acetaminophen    Current Outpatient Medications   Medication Sig Dispense Refill    pantoprazole (PROTONIX) 20 MG tablet Take 1 Tablet by mouth every day. 90 Tablet 3    cyclobenzaprine (FLEXERIL) 5 mg tablet Take 1 Tablet by mouth 3 times a day as needed for Muscle Spasms. 30 Tablet 0    vitamin D3 (CHOLECALCIFEROL) 1000 Unit (25 mcg) Tab Take 1,000 Units by mouth every day.      Multiple Vitamins-Minerals (MULTIVITAMIN ADULT PO) Take 1 Tablet by mouth every day.       No current facility-administered medications for this visit.       Social History     Tobacco Use    Smoking status: Never    Smokeless tobacco: Never   Vaping Use    Vaping Use: Never used   Substance Use Topics    Alcohol use: Yes     Comment: occ    Drug use: Not Currently     Social History     Social History Narrative    Not on file  "      Family History   Problem Relation Age of Onset    Psychiatric Illness Mother     Cancer Father     Heart Disease Father     Cancer Sister         breast    Diabetes Son         ROS:     - Constitutional:  Negative for fever, chills, unexpected weight change, and fatigue/generalized weakness.    - HEENT:  Negative for headaches, vision changes, hearing changes, ear pain, ear discharge, rhinorrhea, sinus congestion, sore throat, and neck pain.      - Respiratory: Negative for cough, sputum production, chest congestion, dyspnea, wheezing, and crackles.      - Cardiovascular: Negative for chest pain, palpitations, orthopnea, and bilateral lower extremity edema.     - Gastrointestinal:Positive for abd pain. Negative for heartburn, nausea, vomiting,  hematochezia, melena, diarrhea, constipation, and greasy/foul-smelling stools.     - Genitourinary:Positive for frequency, dysuria. Negative for  hematuria, pyuria, urinary urgency, and urinary incontinence.     - Musculoskeletal:Positive for back and hip pain. Negative for myalgias,        Exam:    /72   Pulse 75   Temp 36.9 °C (98.4 °F) (Temporal)   Resp 16   Ht 1.575 m (5' 2\")   Wt 67 kg (147 lb 9.6 oz)   SpO2 98%  Body mass index is 27 kg/m².    General:  Well nourished, well developed female in NAD  Head is grossly normal.  Neck: Supple. Thyroid is not enlarged.  Abdomen: Positive for mild RLQ tenderness  Musculoskeletal: Negative CVA tenderness, positive for trace tenderness over R hip and R lower back. MIKE test normal, R hip IR/ER normal.  Skin: Warm and dry. No obvious lesions  Neuro: Normal muscle tone. Gait normal. Alert and oriented.  Psych: Normal mood and affect      Please note that this dictation was created using voice recognition software. I have made every reasonable attempt to correct obvious errors, but I expect that there are errors of grammar and possibly content that I did not discover before finalizing the note.    LABS: 11/21/22: " Results reviewed and discussed with the patient, questions answered.    Assessment/Plan:  1. Flank pain  UA today is normal. Will get culture to rule out infection. Advised to drink a lot of fluids  - POCT Urinalysis  - URINE CULTURE(NEW); Future    2. Right lower quadrant abdominal pain  Considering kidney stone. Will get CT and f/u with results. Advised to go to ER if fever develops or for intractable pain.   - CT-RENAL COLIC EVALUATION(A/P W/O); Future  - URINE CULTURE(NEW); Future

## 2022-11-22 ENCOUNTER — OFFICE VISIT (OUTPATIENT)
Dept: MEDICAL GROUP | Facility: PHYSICIAN GROUP | Age: 67
End: 2022-11-22
Payer: MEDICARE

## 2022-11-22 VITALS
TEMPERATURE: 99.6 F | HEIGHT: 62 IN | OXYGEN SATURATION: 95 % | BODY MASS INDEX: 26.31 KG/M2 | DIASTOLIC BLOOD PRESSURE: 76 MMHG | SYSTOLIC BLOOD PRESSURE: 120 MMHG | WEIGHT: 143 LBS | RESPIRATION RATE: 14 BRPM | HEART RATE: 70 BPM

## 2022-11-22 DIAGNOSIS — M54.50 ACUTE BILATERAL LOW BACK PAIN WITHOUT SCIATICA: ICD-10-CM

## 2022-11-22 DIAGNOSIS — Z00.00 ANNUAL PHYSICAL EXAM: ICD-10-CM

## 2022-11-22 DIAGNOSIS — Z12.12 SCREENING FOR COLORECTAL CANCER: ICD-10-CM

## 2022-11-22 DIAGNOSIS — R10.31 RLQ ABDOMINAL PAIN: ICD-10-CM

## 2022-11-22 DIAGNOSIS — Z12.11 SCREENING FOR COLORECTAL CANCER: ICD-10-CM

## 2022-11-22 DIAGNOSIS — K64.8 OTHER HEMORRHOIDS: ICD-10-CM

## 2022-11-22 DIAGNOSIS — E78.00 ELEVATED LOW DENSITY LIPOPROTEIN (LDL) CHOLESTEROL LEVEL: ICD-10-CM

## 2022-11-22 PROBLEM — M54.42 ACUTE LEFT-SIDED LOW BACK PAIN WITH LEFT-SIDED SCIATICA: Status: RESOLVED | Noted: 2021-03-16 | Resolved: 2022-11-22

## 2022-11-22 PROCEDURE — 99214 OFFICE O/P EST MOD 30 MIN: CPT | Performed by: NURSE PRACTITIONER

## 2022-11-22 RX ORDER — IBUPROFEN 200 MG
200 TABLET ORAL EVERY 6 HOURS PRN
COMMUNITY

## 2022-11-22 ASSESSMENT — FIBROSIS 4 INDEX: FIB4 SCORE: 0.9

## 2022-11-22 NOTE — PROGRESS NOTES
CC:  Chief Complaint   Patient presents with    Back Pain       HISTORY OF PRESENT ILLNESS: Patient is a 67 y.o. female established patient presenting     Acute bilateral low back pain without sciatica  Acute uncomplicated condition.    Onset Saturday after she states she was moving things out of her closet   She notes that it started in her just above her right hip and has moved back towards her bilateral low back.   She notes that the pain does improve after taking Motrin or Tylenol.  However within 4 hours the pain does come back.  She is currently using heating pads, 600 mg Motrin every 8 hours and 1000 mg Tylenol every 8 hours.  She feels that the pain is achy and occasionally 6-8 out of 10  She has tried stretching however does not notice any improvement.    She states at times she will have some pain when she is ambulating and does not notice worsening of pain when sitting or doing any other activities.  She denies any numbness, tingling, bowel or bladder incontinence, weight loss or fevers trauma history of cancer or any falls   States every once in a while she gets chills  She does continue to have her appendix and denies any blood in her urine    Other hemorrhoids  Chronic and stable condition.    Patient has had hemorrhoids for her whole life however has noticed a worsening in the onset 3 months.  She does note that there is only blood on the toilet paper and not in the actual toilet.  She states at times it feels like it is gushing like her.  And she has been using Tucks or cream to help with this.  She notes improvement when she uses the Tucks and the creams.  She denies any increase in her fiber or using suppositories   Denies any abdominal pain or pelvic pain          Allergies:Hydrocodone-acetaminophen    Current Outpatient Medications   Medication Sig Dispense Refill    ibuprofen (MOTRIN) 200 MG Tab Take 200 mg by mouth every 6 hours as needed.      pantoprazole (PROTONIX) 20 MG tablet Take 1 Tablet  "by mouth every day. 90 Tablet 3    cyclobenzaprine (FLEXERIL) 5 mg tablet Take 1 Tablet by mouth 3 times a day as needed for Muscle Spasms. 30 Tablet 0    vitamin D3 (CHOLECALCIFEROL) 1000 Unit (25 mcg) Tab Take 1,000 Units by mouth every day.      Multiple Vitamins-Minerals (MULTIVITAMIN ADULT PO) Take 1 Tablet by mouth every day.       No current facility-administered medications for this visit.     Past Medical History:   Diagnosis Date    Allergic rhinitis 6/2/2005    Dental infection 1/20/2021    Dysuria 8/30/2011    Establishing care with new doctor, encounter for 1/20/2021    Hemangioma of other sites 4/26/2010    Herpes zoster without complication 1/20/2021    Iron deficiency anemia 1/20/2011    Nocturia 8/30/2011    Pelvic pressure in female 10/6/2021    Sinus pressure 10/6/2021    Urgency of urination 8/30/2011    Vaginal itching 9/17/2021    Vitiligo 5/28/2004     History reviewed. No pertinent surgical history.  Social History     Tobacco Use    Smoking status: Never    Smokeless tobacco: Never   Vaping Use    Vaping Use: Never used   Substance Use Topics    Alcohol use: Yes     Comment: occ    Drug use: Not Currently     Social History     Social History Narrative    Not on file       Family History   Problem Relation Age of Onset    Psychiatric Illness Mother     Cancer Father     Heart Disease Father     Cancer Sister         breast    Diabetes Son         ROS  See HPI      - NOTE: All other systems reviewed and are negative, except as in HPI.      Exam:    /76 (BP Location: Left arm, Patient Position: Sitting, BP Cuff Size: Adult)   Pulse 70   Temp 37.6 °C (99.6 °F) (Temporal)   Resp 14   Ht 1.575 m (5' 2\")   Wt 64.9 kg (143 lb)   SpO2 95%  Body mass index is 26.16 kg/m².    General: Alert, pleasant, NAD  EYES:   PERRL, EOMI, no icterus or pallor.  Conjunctivae and lids normal.   HENT:  Normocephalic.  External ears normal.   Abdomen: Non-distended, soft, non-tender, no guarding/rebound. " Bowel sounds present.  No hepatosplenomegaly.  No hernias.  Skin: Warm, dry, no rashes.  Musculoskeletal: Gait is normal.  Moves all extremities well.    Extremities: No clubbing, cyanosis or edema noted.   Neurological: No tremors, sensation grossly intact, tone/strength normal, gait is normal.  Psych:  Affect/mood is normal, judgement is good, memory is intact, grooming is appropriate.    Assessment/Plan:    1. Acute bilateral low back pain without sciatica  - CBC WITHOUT DIFFERENTIAL; Future  - CBC WITHOUT DIFFERENTIAL; Future  - US-ABDOMEN COMPLETE SURVEY; Future    2. Other hemorrhoids  - CBC WITHOUT DIFFERENTIAL; Future  - Referral to GI for Colonoscopy    3. Elevated low density lipoprotein (LDL) cholesterol level  - Lipid Profile; Future    4. Annual physical exam  - CBC WITHOUT DIFFERENTIAL; Future  - Comp Metabolic Panel; Future  - Lipid Profile; Future    5. Screening for colorectal cancer  - Referral to GI for Colonoscopy    6. RLQ abdominal pain  - CBC WITHOUT DIFFERENTIAL; Future  - US-ABDOMEN COMPLETE SURVEY; Future     Discussed with patient that possible differentials could include diverticulitis,, appendicitis, muscle strain.  Offered patient Mobic for pain however she is not interested at this time she would like to stick with her Tylenol and ibuprofen      Discussed with patient possible alternative diagnoses, patient is to take all medications as prescribed.     If symptoms persist FU w/PCP, if symptoms worsen go to emergency room.     If experiencing any side effects from prescribed medications reports to the office immediately or go to emergency room.    Reviewed indication, dosage, usage and potential adverse effects of prescribed medications.     Reviewed risks and benefits of treatment plan. Patient verbalizes understanding of all instruction and verbally agrees to plan.      Return in about 3 weeks (around 12/13/2022) for Follow-up ultrasound, labs.      Please note that this dictation was  created using voice recognition software. I have made every reasonable attempt to correct obvious errors, but I expect that there are errors of grammar and possibly content that I did not discover before finalizing the note.

## 2022-11-22 NOTE — ASSESSMENT & PLAN NOTE
Chronic and stable condition.    Patient has had hemorrhoids for her whole life however has noticed a worsening in the onset 3 months.  She does note that there is only blood on the toilet paper and not in the actual toilet.  She states at times it feels like it is gushing like her.  And she has been using Tucks or cream to help with this.  She notes improvement when she uses the Tucks and the creams.  She denies any increase in her fiber or using suppositories   Denies any abdominal pain or pelvic pain

## 2022-11-22 NOTE — ASSESSMENT & PLAN NOTE
Acute uncomplicated condition.    Onset Saturday after she states she was moving things out of her closet   She notes that it started in her just above her right hip and has moved back towards her bilateral low back.   She notes that the pain does improve after taking Motrin or Tylenol.  However within 4 hours the pain does come back.  She is currently using heating pads, 600 mg Motrin every 8 hours and 1000 mg Tylenol every 8 hours.  She feels that the pain is achy and occasionally 6-8 out of 10  She has tried stretching however does not notice any improvement.    She states at times she will have some pain when she is ambulating and does not notice worsening of pain when sitting or doing any other activities.  She denies any numbness, tingling, bowel or bladder incontinence, weight loss or fevers trauma history of cancer or any falls   States every once in a while she gets chills  She does continue to have her appendix and denies any blood in her urine

## 2022-11-23 ENCOUNTER — APPOINTMENT (OUTPATIENT)
Dept: MEDICAL GROUP | Facility: PHYSICIAN GROUP | Age: 67
End: 2022-11-23
Payer: COMMERCIAL

## 2022-11-24 LAB
BACTERIA UR CULT: NORMAL
SIGNIFICANT IND 70042: NORMAL
SITE SITE: NORMAL
SOURCE SOURCE: NORMAL

## 2022-12-01 ENCOUNTER — APPOINTMENT (OUTPATIENT)
Dept: RADIOLOGY | Facility: MEDICAL CENTER | Age: 67
End: 2022-12-01
Attending: PHYSICIAN ASSISTANT
Payer: MEDICARE

## 2022-12-09 ENCOUNTER — HOSPITAL ENCOUNTER (OUTPATIENT)
Dept: LAB | Facility: MEDICAL CENTER | Age: 67
End: 2022-12-09
Attending: PHYSICIAN ASSISTANT
Payer: MEDICARE

## 2022-12-09 ENCOUNTER — HOSPITAL ENCOUNTER (OUTPATIENT)
Dept: RADIOLOGY | Facility: MEDICAL CENTER | Age: 67
End: 2022-12-09
Attending: PHYSICIAN ASSISTANT
Payer: MEDICARE

## 2022-12-09 DIAGNOSIS — M54.50 ACUTE BILATERAL LOW BACK PAIN WITHOUT SCIATICA: ICD-10-CM

## 2022-12-09 DIAGNOSIS — R10.31 RIGHT LOWER QUADRANT ABDOMINAL PAIN: ICD-10-CM

## 2022-12-09 DIAGNOSIS — Z00.00 ANNUAL PHYSICAL EXAM: ICD-10-CM

## 2022-12-09 DIAGNOSIS — K64.8 OTHER HEMORRHOIDS: ICD-10-CM

## 2022-12-09 LAB
ERYTHROCYTE [DISTWIDTH] IN BLOOD BY AUTOMATED COUNT: 42.2 FL (ref 35.9–50)
HCT VFR BLD AUTO: 43.1 % (ref 37–47)
HGB BLD-MCNC: 14.4 G/DL (ref 12–16)
MCH RBC QN AUTO: 29.6 PG (ref 27–33)
MCHC RBC AUTO-ENTMCNC: 33.4 G/DL (ref 33.6–35)
MCV RBC AUTO: 88.5 FL (ref 81.4–97.8)
PLATELET # BLD AUTO: 332 K/UL (ref 164–446)
PMV BLD AUTO: 11.1 FL (ref 9–12.9)
RBC # BLD AUTO: 4.87 M/UL (ref 4.2–5.4)
WBC # BLD AUTO: 6.3 K/UL (ref 4.8–10.8)

## 2022-12-09 PROCEDURE — 74176 CT ABD & PELVIS W/O CONTRAST: CPT

## 2022-12-09 PROCEDURE — 36415 COLL VENOUS BLD VENIPUNCTURE: CPT

## 2022-12-09 PROCEDURE — 85027 COMPLETE CBC AUTOMATED: CPT

## 2022-12-15 ENCOUNTER — OFFICE VISIT (OUTPATIENT)
Dept: MEDICAL GROUP | Facility: PHYSICIAN GROUP | Age: 67
End: 2022-12-15
Payer: MEDICARE

## 2022-12-15 VITALS
SYSTOLIC BLOOD PRESSURE: 128 MMHG | TEMPERATURE: 99 F | BODY MASS INDEX: 26.39 KG/M2 | HEART RATE: 75 BPM | WEIGHT: 143.4 LBS | RESPIRATION RATE: 12 BRPM | DIASTOLIC BLOOD PRESSURE: 78 MMHG | HEIGHT: 62 IN | OXYGEN SATURATION: 95 %

## 2022-12-15 DIAGNOSIS — J01.10 ACUTE NON-RECURRENT FRONTAL SINUSITIS: ICD-10-CM

## 2022-12-15 DIAGNOSIS — R09.81 SINUS CONGESTION: ICD-10-CM

## 2022-12-15 PROCEDURE — 99214 OFFICE O/P EST MOD 30 MIN: CPT | Performed by: NURSE PRACTITIONER

## 2022-12-15 RX ORDER — AMOXICILLIN 875 MG/1
875 TABLET, COATED ORAL 2 TIMES DAILY
Qty: 10 TABLET | Refills: 0 | Status: SHIPPED | OUTPATIENT
Start: 2022-12-15 | End: 2023-01-06

## 2022-12-15 ASSESSMENT — FIBROSIS 4 INDEX: FIB4 SCORE: 0.86

## 2022-12-15 NOTE — PROGRESS NOTES
Chief Complaint   Patient presents with    Sore Throat     x2wks        HPI: Patient is a 67 y.o. female complaining of 14 days of illness including: chills, facial pain, nasal congestion, sore throat.   Mucus is: thick.  Similarly ill exposures: no.  Treatments tried: Mucinex, Robitussin, Flonase, [place], heat therapy, humidifier, steam showers, nose flushes, teas  She  reports that she has never smoked. She has never used smokeless tobacco..     ROS:  No fever, cough, nausea, changes in bowel movements or skin rash.      I reviewed the patient's medications, allergies and medical history:  Current Outpatient Medications   Medication Sig Dispense Refill    Fluticasone Propionate (FLONASE NA) Administer  into affected nostril(S).      Pseudoephedrine-DM-GG (ROBITUSSIN COLD & COUGH PO) Take  by mouth.      amoxicillin (AMOXIL) 875 MG tablet Take 1 Tablet by mouth 2 times a day. 10 Tablet 0    ibuprofen (MOTRIN) 200 MG Tab Take 200 mg by mouth every 6 hours as needed.      pantoprazole (PROTONIX) 20 MG tablet Take 1 Tablet by mouth every day. 90 Tablet 3    cyclobenzaprine (FLEXERIL) 5 mg tablet Take 1 Tablet by mouth 3 times a day as needed for Muscle Spasms. 30 Tablet 0    vitamin D3 (CHOLECALCIFEROL) 1000 Unit (25 mcg) Tab Take 1,000 Units by mouth every day.      Multiple Vitamins-Minerals (MULTIVITAMIN ADULT PO) Take 1 Tablet by mouth every day.       No current facility-administered medications for this visit.     Hydrocodone-acetaminophen  Past Medical History:   Diagnosis Date    Allergic rhinitis 6/2/2005    Dental infection 1/20/2021    Dysuria 8/30/2011    Establishing care with new doctor, encounter for 1/20/2021    Hemangioma of other sites 4/26/2010    Herpes zoster without complication 1/20/2021    Iron deficiency anemia 1/20/2011    Nocturia 8/30/2011    Pelvic pressure in female 10/6/2021    Sinus pressure 10/6/2021    Urgency of urination 8/30/2011    Vaginal itching 9/17/2021    Vitiligo  "5/28/2004        EXAM:  /78 (BP Location: Right arm, Patient Position: Sitting, BP Cuff Size: Adult)   Pulse 75   Temp 37.2 °C (99 °F) (Temporal)   Resp 12   Ht 1.575 m (5' 2\")   Wt 65 kg (143 lb 6.4 oz)   SpO2 95%   General: Alert, no conversational dyspnea or audible wheeze, non-toxic appearance.  Eyes: PERRL, conjunctiva slightly injected, no eye discharge.  Ears: Normal pinnae,TM's normal bilaterally.  Nares: Patent with thick mucus.  Sinuses: tender over maxillary / frontal sinuses.  Throat: Erythematous injection without exudate.   Neck: Supple, with no adenopathy, no adenopathy in the neck or supraclavicular regions.  Lungs: Clear to auscultation bilaterally, no wheeze, crackles or rhonchi.   Heart: Regular rate without murmur.  Skin: Warm and dry without rash.     ASSESSMENT:     1. Sinus congestion  - amoxicillin (AMOXIL) 875 MG tablet; Take 1 Tablet by mouth 2 times a day.  Dispense: 10 Tablet; Refill: 0  2. Acute non-recurrent frontal sinusitis   PLAN:  1. Educated patient that majority of upper respiratory infections are viral and do not need antibiotics. As symptoms have been worsening over the last week, will treat with antibiotics.  2. Twice daily use of nasal saline rinse or Neti-Pot.  3. OTC anti-pyretics and decongestants as needed.  4. Follow-up in office or urgent care for worsening symptoms, difficulty breathing, lack of expected recovery, or should new symptoms or problems arise.    "

## 2022-12-19 ENCOUNTER — TELEPHONE (OUTPATIENT)
Dept: HEALTH INFORMATION MANAGEMENT | Facility: OTHER | Age: 67
End: 2022-12-19
Payer: COMMERCIAL

## 2022-12-22 ENCOUNTER — APPOINTMENT (OUTPATIENT)
Dept: RADIOLOGY | Facility: MEDICAL CENTER | Age: 67
End: 2022-12-22
Attending: NURSE PRACTITIONER
Payer: MEDICARE

## 2023-01-05 DIAGNOSIS — K25.7 CHRONIC GASTRIC ULCER WITHOUT HEMORRHAGE AND WITHOUT PERFORATION: ICD-10-CM

## 2023-01-05 RX ORDER — PANTOPRAZOLE SODIUM 20 MG/1
20 TABLET, DELAYED RELEASE ORAL DAILY
Qty: 90 TABLET | Refills: 3 | Status: SHIPPED | OUTPATIENT
Start: 2023-01-05 | End: 2023-01-06 | Stop reason: SDUPTHER

## 2023-01-05 NOTE — TELEPHONE ENCOUNTER
Received request via: Pharmacy    Was the patient seen in the last year in this department? Yes    Does the patient have an active prescription (recently filled or refills available) for medication(s) requested? No    Does the patient have care home Plus and need 100 day supply (blood pressure, diabetes and cholesterol meds only)? Medication is not for cholesterol, blood pressure or diabetes

## 2023-01-06 ENCOUNTER — TELEPHONE (OUTPATIENT)
Dept: MEDICAL GROUP | Facility: PHYSICIAN GROUP | Age: 68
End: 2023-01-06

## 2023-01-06 ENCOUNTER — OFFICE VISIT (OUTPATIENT)
Dept: MEDICAL GROUP | Facility: PHYSICIAN GROUP | Age: 68
End: 2023-01-06
Payer: MEDICARE

## 2023-01-06 VITALS
SYSTOLIC BLOOD PRESSURE: 102 MMHG | BODY MASS INDEX: 25.8 KG/M2 | DIASTOLIC BLOOD PRESSURE: 68 MMHG | TEMPERATURE: 99.6 F | RESPIRATION RATE: 12 BRPM | HEART RATE: 98 BPM | HEIGHT: 62 IN | OXYGEN SATURATION: 96 % | WEIGHT: 140.2 LBS

## 2023-01-06 DIAGNOSIS — J01.11 ACUTE RECURRENT FRONTAL SINUSITIS: ICD-10-CM

## 2023-01-06 DIAGNOSIS — R09.81 SINUS CONGESTION: ICD-10-CM

## 2023-01-06 DIAGNOSIS — K25.7 CHRONIC GASTRIC ULCER WITHOUT HEMORRHAGE AND WITHOUT PERFORATION: ICD-10-CM

## 2023-01-06 PROCEDURE — 99214 OFFICE O/P EST MOD 30 MIN: CPT | Performed by: NURSE PRACTITIONER

## 2023-01-06 PROCEDURE — RXMED WILLOW AMBULATORY MEDICATION CHARGE: Performed by: NURSE PRACTITIONER

## 2023-01-06 RX ORDER — DOXYCYCLINE HYCLATE 100 MG
100 TABLET ORAL 2 TIMES DAILY
Qty: 14 TABLET | Refills: 0 | Status: SHIPPED | OUTPATIENT
Start: 2023-01-06 | End: 2023-04-04

## 2023-01-06 RX ORDER — PANTOPRAZOLE SODIUM 20 MG/1
20 TABLET, DELAYED RELEASE ORAL DAILY
Qty: 90 TABLET | Refills: 3 | Status: SHIPPED | OUTPATIENT
Start: 2023-01-06 | End: 2023-12-14 | Stop reason: SDUPTHER

## 2023-01-06 RX ORDER — DOXYCYCLINE HYCLATE 100 MG
100 TABLET ORAL 2 TIMES DAILY
Qty: 14 TABLET | Refills: 0 | Status: SHIPPED | OUTPATIENT
Start: 2023-01-06 | End: 2023-01-06 | Stop reason: SDUPTHER

## 2023-01-06 ASSESSMENT — PATIENT HEALTH QUESTIONNAIRE - PHQ9: CLINICAL INTERPRETATION OF PHQ2 SCORE: 0

## 2023-01-06 ASSESSMENT — FIBROSIS 4 INDEX: FIB4 SCORE: 0.86

## 2023-01-06 NOTE — PROGRESS NOTES
CC:  Chief Complaint   Patient presents with    Other     Throat        HISTORY OF PRESENT ILLNESS: Patient is a 67 y.o. female established patient presenting     Sinus congestion  Chronic and unstable condition.  Patient her last visit was prescribed amoxicillin which she noted did help improve her symptoms however after few days she went back to feeling pressure and congestion.  She states now she feels as if she has a mucous plug stuck is unable to clear it.  She has tried multiple supportive care as well as using humidifier, nasal irrigation without any improvement.  Denies chills, fevers, nausea, vomiting, balance issues or ear pain       Allergies:Acyclovir and Hydrocodone-acetaminophen    Current Outpatient Medications   Medication Sig Dispense Refill    doxycycline (VIBRAMYCIN) 100 MG Tab Take 1 Tablet by mouth 2 times a day. 14 Tablet 0    pantoprazole (PROTONIX) 20 MG tablet Take 1 Tablet by mouth every day. 90 Tablet 3    Fluticasone Propionate (FLONASE NA) Administer  into affected nostril(S).      Pseudoephedrine-DM-GG (ROBITUSSIN COLD & COUGH PO) Take  by mouth.      ibuprofen (MOTRIN) 200 MG Tab Take 200 mg by mouth every 6 hours as needed.      cyclobenzaprine (FLEXERIL) 5 mg tablet Take 1 Tablet by mouth 3 times a day as needed for Muscle Spasms. 30 Tablet 0    vitamin D3 (CHOLECALCIFEROL) 1000 Unit (25 mcg) Tab Take 1,000 Units by mouth every day.      Multiple Vitamins-Minerals (MULTIVITAMIN ADULT PO) Take 1 Tablet by mouth every day.       No current facility-administered medications for this visit.     Past Medical History:   Diagnosis Date    Allergic rhinitis 6/2/2005    Dental infection 1/20/2021    Dysuria 8/30/2011    Establishing care with new doctor, encounter for 1/20/2021    Hemangioma of other sites 4/26/2010    Herpes zoster without complication 1/20/2021    Iron deficiency anemia 1/20/2011    Nocturia 8/30/2011    Pelvic pressure in female 10/6/2021    Sinus pressure 10/6/2021     "Urgency of urination 8/30/2011    Vaginal itching 9/17/2021    Vitiligo 5/28/2004     History reviewed. No pertinent surgical history.  Social History     Tobacco Use    Smoking status: Never    Smokeless tobacco: Never   Vaping Use    Vaping Use: Never used   Substance Use Topics    Alcohol use: Yes     Comment: occ    Drug use: Not Currently     Social History     Social History Narrative    Not on file       Family History   Problem Relation Age of Onset    Psychiatric Illness Mother     Cancer Father     Heart Disease Father     Cancer Sister         breast    Diabetes Son         ROS  See HPI      - NOTE: All other systems reviewed and are negative, except as in HPI.      Exam:    /68 (BP Location: Left arm, Patient Position: Sitting, BP Cuff Size: Adult)   Pulse 98   Temp 37.6 °C (99.6 °F) (Temporal)   Resp 12   Ht 1.575 m (5' 2\")   Wt 63.6 kg (140 lb 3.2 oz)   SpO2 96%  Body mass index is 25.64 kg/m².    General: Alert, pleasant, NAD  EYES:   PERRL, EOMI, no icterus or pallor.  Conjunctivae and lids normal.   HENT:  Normocephalic.  External ears normal. Tympanic membranes pearly, opaque.  No nasal drainage present.  Oropharynx slightly erythematous, no exudate noted, mucous membranes moist.  Neck supple.   No thyromegaly or masses palpated. No cervical or supraclavicular lymphadenopathy.  Heart: Regular rate and rhythm.  S1 and S2 normal.  No murmurs appreciated.  Respiratory: Normal respiratory effort.  Clear to auscultation bilaterally.  Psych:  Affect/mood is normal, judgement is good, memory is intact, grooming is appropriate.      Assessment/Plan:    1. Sinus congestion  - doxycycline (VIBRAMYCIN) 100 MG Tab; Take 1 Tablet by mouth 2 times a day.  Dispense: 14 Tablet; Refill: 0  2. Acute recurrent frontal sinusitis  - doxycycline (VIBRAMYCIN) 100 MG Tab; Take 1 Tablet by mouth 2 times a day.  Dispense: 14 Tablet; Refill: 0  Discussed with patient as she did have improvement with the " amoxicillin and became sick and we will go ahead and start her on doxycycline.  If patient continues to have symptoms we need to do a referral for ENT.    3. Chronic gastric ulcer without hemorrhage and without perforation  - pantoprazole (PROTONIX) 20 MG tablet; Take 1 Tablet by mouth every day.  Dispense: 90 Tablet; Refill: 3       Discussed with patient possible alternative diagnoses, patient is to take all medications as prescribed.     If symptoms persist FU w/PCP, if symptoms worsen go to emergency room.     If experiencing any side effects from prescribed medications reports to the office immediately or go to emergency room.    Reviewed indication, dosage, usage and potential adverse effects of prescribed medications.     Reviewed risks and benefits of treatment plan. Patient verbalizes understanding of all instruction and verbally agrees to plan.      Return for As needed for nasal congestion.      Please note that this dictation was created using voice recognition software. I have made every reasonable attempt to correct obvious errors, but I expect that there are errors of grammar and possibly content that I did not discover before finalizing the note.

## 2023-01-06 NOTE — ASSESSMENT & PLAN NOTE
Chronic and unstable condition.  Patient her last visit was prescribed amoxicillin which she noted did help improve her symptoms however after few days she went back to feeling pressure and congestion.  She states now she feels as if she has a mucous plug stuck is unable to clear it.  She has tried multiple supportive care as well as using humidifier, nasal irrigation without any improvement.  Denies chills, fevers, nausea, vomiting, balance issues or ear pain

## 2023-01-07 NOTE — TELEPHONE ENCOUNTER
Patient called requesting Doxycycline to be sent to [facility] pharmacy [facility].

## 2023-01-09 ENCOUNTER — PHARMACY VISIT (OUTPATIENT)
Dept: PHARMACY | Facility: MEDICAL CENTER | Age: 68
End: 2023-01-09
Payer: COMMERCIAL

## 2023-01-16 DIAGNOSIS — R09.81 SINUS CONGESTION: ICD-10-CM

## 2023-02-21 ENCOUNTER — OFFICE VISIT (OUTPATIENT)
Dept: MEDICAL GROUP | Facility: PHYSICIAN GROUP | Age: 68
End: 2023-02-21
Payer: MEDICARE

## 2023-02-21 VITALS
RESPIRATION RATE: 12 BRPM | SYSTOLIC BLOOD PRESSURE: 124 MMHG | BODY MASS INDEX: 26.61 KG/M2 | TEMPERATURE: 99.1 F | OXYGEN SATURATION: 95 % | WEIGHT: 144.6 LBS | HEART RATE: 63 BPM | HEIGHT: 62 IN | DIASTOLIC BLOOD PRESSURE: 72 MMHG

## 2023-02-21 DIAGNOSIS — R09.81 SINUS CONGESTION: ICD-10-CM

## 2023-02-21 DIAGNOSIS — J01.11 ACUTE RECURRENT FRONTAL SINUSITIS: ICD-10-CM

## 2023-02-21 PROCEDURE — 99214 OFFICE O/P EST MOD 30 MIN: CPT | Performed by: NURSE PRACTITIONER

## 2023-02-21 RX ORDER — CLINDAMYCIN HYDROCHLORIDE 300 MG/1
300 CAPSULE ORAL 3 TIMES DAILY
Qty: 15 CAPSULE | Refills: 0 | Status: SHIPPED | OUTPATIENT
Start: 2023-02-21 | End: 2023-04-04

## 2023-02-21 ASSESSMENT — FIBROSIS 4 INDEX: FIB4 SCORE: 0.86

## 2023-02-21 NOTE — PROGRESS NOTES
CC:  Chief Complaint   Patient presents with    Earache     X1wk    Sinus Problem     x2days       HISTORY OF PRESENT ILLNESS: Patient is a 67 y.o. female established patient presenting     Sinus congestion  Acute on chronic condition.    Patient states that condition started roughly about 1 week ago   She states roughly 2 weeks ago she was using a [place] pot and feels that that could have been the cause of her sinus congestion/infection  He notes that her discomfort is behind right ear and feels like there is fluid in her ear.  She also notes sinus pain above and below her right eye wraps around to her right ear.  She has been taking Mucinex.  She has not been taking any allergy medication or Flonase.    Denies chills, fevers, nausea, vomiting, imbalance, ringing       Allergies:Acyclovir and Hydrocodone-acetaminophen    Current Outpatient Medications   Medication Sig Dispense Refill    clindamycin (CLEOCIN) 300 MG Cap Take 1 Capsule by mouth 3 times a day. 15 Capsule 0    pantoprazole (PROTONIX) 20 MG tablet Take 1 Tablet by mouth every day. 90 Tablet 3    doxycycline (VIBRAMYCIN) 100 MG Tab Take 1 Tablet by mouth 2 times a day. 14 Tablet 0    Fluticasone Propionate (FLONASE NA) Administer  into affected nostril(S).      Pseudoephedrine-DM-GG (ROBITUSSIN COLD & COUGH PO) Take  by mouth.      ibuprofen (MOTRIN) 200 MG Tab Take 200 mg by mouth every 6 hours as needed.      cyclobenzaprine (FLEXERIL) 5 mg tablet Take 1 Tablet by mouth 3 times a day as needed for Muscle Spasms. 30 Tablet 0    vitamin D3 (CHOLECALCIFEROL) 1000 Unit (25 mcg) Tab Take 1,000 Units by mouth every day.      Multiple Vitamins-Minerals (MULTIVITAMIN ADULT PO) Take 1 Tablet by mouth every day.       No current facility-administered medications for this visit.     Past Medical History:   Diagnosis Date    Allergic rhinitis 6/2/2005    Dental infection 1/20/2021    Dysuria 8/30/2011    Establishing care with new doctor, encounter for 1/20/2021  "   Hemangioma of other sites 4/26/2010    Herpes zoster without complication 1/20/2021    Iron deficiency anemia 1/20/2011    Nocturia 8/30/2011    Pelvic pressure in female 10/6/2021    Sinus pressure 10/6/2021    Urgency of urination 8/30/2011    Vaginal itching 9/17/2021    Vitiligo 5/28/2004     History reviewed. No pertinent surgical history.  Social History     Tobacco Use    Smoking status: Never    Smokeless tobacco: Never   Vaping Use    Vaping Use: Never used   Substance Use Topics    Alcohol use: Yes     Comment: occ    Drug use: Not Currently     Social History     Social History Narrative    Not on file       Family History   Problem Relation Age of Onset    Psychiatric Illness Mother     Cancer Father     Heart Disease Father     Cancer Sister         breast    Diabetes Son         ROS  See HPI      - NOTE: All other systems reviewed and are negative, except as in HPI.      Exam:    /72 (BP Location: Left arm, Patient Position: Sitting, BP Cuff Size: Adult)   Pulse 63   Temp 37.3 °C (99.1 °F) (Temporal)   Resp 12   Ht 1.575 m (5' 2\")   Wt 65.6 kg (144 lb 9.6 oz)   SpO2 95%  Body mass index is 26.45 kg/m².    General: Alert, pleasant, NAD  EYES:   PERRL, EOMI, no icterus or pallor.  Conjunctivae and lids normal.   HENT:  Normocephalic.  External ears normal. Tympanic membranes pearly, opaque.  No nasal drainage present.  Oropharynx non-erythematous, mucous membranes moist.  Neck supple.   No thyromegaly or masses palpated. No cervical or supraclavicular lymphadenopathy.  Musculoskeletal: Gait is normal.  Moves all extremities well.    Extremities: No clubbing, cyanosis or edema noted.   Neurological: No tremors, sensation grossly intact, tone/strength normal, gait is normal.  Psych:  Affect/mood is normal, judgement is good, memory is intact, grooming is appropriate.    Assessment/Plan:    1. Sinus congestion  2. Acute recurrent frontal sinusitis  - clindamycin (CLEOCIN) 300 MG Cap; Take 1 " Capsule by mouth 3 times a day.  Dispense: 15 Capsule; Refill: 0  Patient also started allergy medication as an Allegra, Zyrtec, Claritin as well as Flonase twice daily.  Patient states she has follow-up appointment with ENT on 3/24/2023      Discussed with patient possible alternative diagnoses, patient is to take all medications as prescribed.     If symptoms persist FU w/PCP, if symptoms worsen go to emergency room.     If experiencing any side effects from prescribed medications reports to the office immediately or go to emergency room.    Reviewed indication, dosage, usage and potential adverse effects of prescribed medications.     Reviewed risks and benefits of treatment plan. Patient verbalizes understanding of all instruction and verbally agrees to plan.      Return for prn.      Please note that this dictation was created using voice recognition software. I have made every reasonable attempt to correct obvious errors, but I expect that there are errors of grammar and possibly content that I did not discover before finalizing the note.

## 2023-02-21 NOTE — ASSESSMENT & PLAN NOTE
Acute on chronic condition.    Patient states that condition started roughly about 1 week ago   She states roughly 2 weeks ago she was using a [place] pot and feels that that could have been the cause of her sinus congestion/infection  He notes that her discomfort is behind right ear and feels like there is fluid in her ear.  She also notes sinus pain above and below her right eye wraps around to her right ear.  She has been taking Mucinex.  She has not been taking any allergy medication or Flonase.    Denies chills, fevers, nausea, vomiting, imbalance, ringing

## 2023-02-28 ENCOUNTER — APPOINTMENT (OUTPATIENT)
Dept: MEDICAL GROUP | Facility: MEDICAL CENTER | Age: 68
End: 2023-02-28
Payer: MEDICARE

## 2023-03-01 DIAGNOSIS — R09.89 THROAT FULLNESS: ICD-10-CM

## 2023-03-31 ENCOUNTER — HOSPITAL ENCOUNTER (OUTPATIENT)
Dept: LAB | Facility: MEDICAL CENTER | Age: 68
End: 2023-03-31
Attending: NURSE PRACTITIONER
Payer: MEDICARE

## 2023-03-31 DIAGNOSIS — E78.00 ELEVATED LOW DENSITY LIPOPROTEIN (LDL) CHOLESTEROL LEVEL: ICD-10-CM

## 2023-03-31 DIAGNOSIS — R09.89 THROAT FULLNESS: ICD-10-CM

## 2023-03-31 DIAGNOSIS — Z00.00 ANNUAL PHYSICAL EXAM: ICD-10-CM

## 2023-03-31 DIAGNOSIS — M54.50 ACUTE BILATERAL LOW BACK PAIN WITHOUT SCIATICA: ICD-10-CM

## 2023-03-31 DIAGNOSIS — R10.31 RLQ ABDOMINAL PAIN: ICD-10-CM

## 2023-03-31 LAB
ALBUMIN SERPL BCP-MCNC: 4.1 G/DL (ref 3.2–4.9)
ALBUMIN/GLOB SERPL: 1.7 G/DL
ALP SERPL-CCNC: 94 U/L (ref 30–99)
ALT SERPL-CCNC: 9 U/L (ref 2–50)
ANION GAP SERPL CALC-SCNC: 11 MMOL/L (ref 7–16)
AST SERPL-CCNC: 10 U/L (ref 12–45)
BILIRUB SERPL-MCNC: 0.3 MG/DL (ref 0.1–1.5)
BUN SERPL-MCNC: 17 MG/DL (ref 8–22)
CALCIUM ALBUM COR SERPL-MCNC: 9.2 MG/DL (ref 8.5–10.5)
CALCIUM SERPL-MCNC: 9.3 MG/DL (ref 8.5–10.5)
CHLORIDE SERPL-SCNC: 105 MMOL/L (ref 96–112)
CHOLEST SERPL-MCNC: 210 MG/DL (ref 100–199)
CO2 SERPL-SCNC: 25 MMOL/L (ref 20–33)
CREAT SERPL-MCNC: 0.77 MG/DL (ref 0.5–1.4)
ERYTHROCYTE [DISTWIDTH] IN BLOOD BY AUTOMATED COUNT: 42.8 FL (ref 35.9–50)
FASTING STATUS PATIENT QL REPORTED: NORMAL
GFR SERPLBLD CREATININE-BSD FMLA CKD-EPI: 84 ML/MIN/1.73 M 2
GLOBULIN SER CALC-MCNC: 2.4 G/DL (ref 1.9–3.5)
GLUCOSE SERPL-MCNC: 100 MG/DL (ref 65–99)
HCT VFR BLD AUTO: 40.9 % (ref 37–47)
HDLC SERPL-MCNC: 56 MG/DL
HGB BLD-MCNC: 13.5 G/DL (ref 12–16)
LDLC SERPL CALC-MCNC: 127 MG/DL
MCH RBC QN AUTO: 29.4 PG (ref 27–33)
MCHC RBC AUTO-ENTMCNC: 33 G/DL (ref 33.6–35)
MCV RBC AUTO: 89.1 FL (ref 81.4–97.8)
PLATELET # BLD AUTO: 363 K/UL (ref 164–446)
PMV BLD AUTO: 11 FL (ref 9–12.9)
POTASSIUM SERPL-SCNC: 3.7 MMOL/L (ref 3.6–5.5)
PROT SERPL-MCNC: 6.5 G/DL (ref 6–8.2)
RBC # BLD AUTO: 4.59 M/UL (ref 4.2–5.4)
SODIUM SERPL-SCNC: 141 MMOL/L (ref 135–145)
T4 FREE SERPL-MCNC: 0.96 NG/DL (ref 0.93–1.7)
THYROPEROXIDASE AB SERPL-ACNC: 23 IU/ML (ref 0–9)
TRIGL SERPL-MCNC: 137 MG/DL (ref 0–149)
TSH SERPL DL<=0.005 MIU/L-ACNC: 4.07 UIU/ML (ref 0.38–5.33)
WBC # BLD AUTO: 6.3 K/UL (ref 4.8–10.8)

## 2023-03-31 PROCEDURE — 80053 COMPREHEN METABOLIC PANEL: CPT

## 2023-03-31 PROCEDURE — 84445 ASSAY OF TSI GLOBULIN: CPT

## 2023-03-31 PROCEDURE — 84439 ASSAY OF FREE THYROXINE: CPT

## 2023-03-31 PROCEDURE — 84443 ASSAY THYROID STIM HORMONE: CPT

## 2023-03-31 PROCEDURE — 85027 COMPLETE CBC AUTOMATED: CPT

## 2023-03-31 PROCEDURE — 86376 MICROSOMAL ANTIBODY EACH: CPT

## 2023-03-31 PROCEDURE — 36415 COLL VENOUS BLD VENIPUNCTURE: CPT

## 2023-03-31 PROCEDURE — 86800 THYROGLOBULIN ANTIBODY: CPT

## 2023-03-31 PROCEDURE — 80061 LIPID PANEL: CPT

## 2023-04-01 SDOH — ECONOMIC STABILITY: TRANSPORTATION INSECURITY
IN THE PAST 12 MONTHS, HAS THE LACK OF TRANSPORTATION KEPT YOU FROM MEDICAL APPOINTMENTS OR FROM GETTING MEDICATIONS?: NO

## 2023-04-01 SDOH — ECONOMIC STABILITY: HOUSING INSECURITY
IN THE LAST 12 MONTHS, WAS THERE A TIME WHEN YOU DID NOT HAVE A STEADY PLACE TO SLEEP OR SLEPT IN A SHELTER (INCLUDING NOW)?: NO

## 2023-04-01 SDOH — ECONOMIC STABILITY: INCOME INSECURITY: HOW HARD IS IT FOR YOU TO PAY FOR THE VERY BASICS LIKE FOOD, HOUSING, MEDICAL CARE, AND HEATING?: NOT VERY HARD

## 2023-04-01 SDOH — ECONOMIC STABILITY: FOOD INSECURITY: WITHIN THE PAST 12 MONTHS, YOU WORRIED THAT YOUR FOOD WOULD RUN OUT BEFORE YOU GOT MONEY TO BUY MORE.: NEVER TRUE

## 2023-04-01 SDOH — HEALTH STABILITY: PHYSICAL HEALTH: ON AVERAGE, HOW MANY MINUTES DO YOU ENGAGE IN EXERCISE AT THIS LEVEL?: 30 MIN

## 2023-04-01 SDOH — ECONOMIC STABILITY: INCOME INSECURITY: IN THE LAST 12 MONTHS, WAS THERE A TIME WHEN YOU WERE NOT ABLE TO PAY THE MORTGAGE OR RENT ON TIME?: NO

## 2023-04-01 SDOH — ECONOMIC STABILITY: FOOD INSECURITY: WITHIN THE PAST 12 MONTHS, THE FOOD YOU BOUGHT JUST DIDN'T LAST AND YOU DIDN'T HAVE MONEY TO GET MORE.: NEVER TRUE

## 2023-04-01 SDOH — ECONOMIC STABILITY: HOUSING INSECURITY: IN THE LAST 12 MONTHS, HOW MANY PLACES HAVE YOU LIVED?: 1

## 2023-04-01 SDOH — ECONOMIC STABILITY: TRANSPORTATION INSECURITY
IN THE PAST 12 MONTHS, HAS LACK OF TRANSPORTATION KEPT YOU FROM MEETINGS, WORK, OR FROM GETTING THINGS NEEDED FOR DAILY LIVING?: NO

## 2023-04-01 SDOH — HEALTH STABILITY: MENTAL HEALTH
STRESS IS WHEN SOMEONE FEELS TENSE, NERVOUS, ANXIOUS, OR CAN'T SLEEP AT NIGHT BECAUSE THEIR MIND IS TROUBLED. HOW STRESSED ARE YOU?: TO SOME EXTENT

## 2023-04-01 SDOH — HEALTH STABILITY: PHYSICAL HEALTH: ON AVERAGE, HOW MANY DAYS PER WEEK DO YOU ENGAGE IN MODERATE TO STRENUOUS EXERCISE (LIKE A BRISK WALK)?: 3 DAYS

## 2023-04-01 SDOH — ECONOMIC STABILITY: TRANSPORTATION INSECURITY
IN THE PAST 12 MONTHS, HAS LACK OF RELIABLE TRANSPORTATION KEPT YOU FROM MEDICAL APPOINTMENTS, MEETINGS, WORK OR FROM GETTING THINGS NEEDED FOR DAILY LIVING?: NO

## 2023-04-01 ASSESSMENT — LIFESTYLE VARIABLES
HOW MANY STANDARD DRINKS CONTAINING ALCOHOL DO YOU HAVE ON A TYPICAL DAY: 1 OR 2
HOW OFTEN DO YOU HAVE SIX OR MORE DRINKS ON ONE OCCASION: NEVER
AUDIT-C TOTAL SCORE: 1
SKIP TO QUESTIONS 9-10: 1
HOW OFTEN DO YOU HAVE A DRINK CONTAINING ALCOHOL: MONTHLY OR LESS

## 2023-04-01 ASSESSMENT — SOCIAL DETERMINANTS OF HEALTH (SDOH)
DO YOU BELONG TO ANY CLUBS OR ORGANIZATIONS SUCH AS CHURCH GROUPS UNIONS, FRATERNAL OR ATHLETIC GROUPS, OR SCHOOL GROUPS?: NO
HOW OFTEN DO YOU ATTEND CHURCH OR RELIGIOUS SERVICES?: NEVER
HOW OFTEN DO YOU ATTEND CHURCH OR RELIGIOUS SERVICES?: NEVER
HOW OFTEN DO YOU ATTENT MEETINGS OF THE CLUB OR ORGANIZATION YOU BELONG TO?: NEVER
HOW OFTEN DO YOU HAVE A DRINK CONTAINING ALCOHOL: MONTHLY OR LESS
HOW MANY DRINKS CONTAINING ALCOHOL DO YOU HAVE ON A TYPICAL DAY WHEN YOU ARE DRINKING: 1 OR 2
WITHIN THE PAST 12 MONTHS, YOU WORRIED THAT YOUR FOOD WOULD RUN OUT BEFORE YOU GOT THE MONEY TO BUY MORE: NEVER TRUE
DO YOU BELONG TO ANY CLUBS OR ORGANIZATIONS SUCH AS CHURCH GROUPS UNIONS, FRATERNAL OR ATHLETIC GROUPS, OR SCHOOL GROUPS?: NO
HOW OFTEN DO YOU HAVE SIX OR MORE DRINKS ON ONE OCCASION: NEVER
IN A TYPICAL WEEK, HOW MANY TIMES DO YOU TALK ON THE PHONE WITH FAMILY, FRIENDS, OR NEIGHBORS?: MORE THAN THREE TIMES A WEEK
HOW OFTEN DO YOU GET TOGETHER WITH FRIENDS OR RELATIVES?: MORE THAN THREE TIMES A WEEK
IN A TYPICAL WEEK, HOW MANY TIMES DO YOU TALK ON THE PHONE WITH FAMILY, FRIENDS, OR NEIGHBORS?: MORE THAN THREE TIMES A WEEK
HOW OFTEN DO YOU GET TOGETHER WITH FRIENDS OR RELATIVES?: MORE THAN THREE TIMES A WEEK
HOW HARD IS IT FOR YOU TO PAY FOR THE VERY BASICS LIKE FOOD, HOUSING, MEDICAL CARE, AND HEATING?: NOT VERY HARD
HOW OFTEN DO YOU ATTENT MEETINGS OF THE CLUB OR ORGANIZATION YOU BELONG TO?: NEVER

## 2023-04-02 LAB — THYROGLOB AB SERPL-ACNC: <0.9 IU/ML (ref 0–4)

## 2023-04-02 PROCEDURE — RXMED WILLOW AMBULATORY MEDICATION CHARGE: Performed by: NURSE PRACTITIONER

## 2023-04-03 LAB — TSI SER-ACNC: <0.1 IU/L

## 2023-04-04 ENCOUNTER — PHARMACY VISIT (OUTPATIENT)
Dept: PHARMACY | Facility: MEDICAL CENTER | Age: 68
End: 2023-04-04
Payer: COMMERCIAL

## 2023-04-04 ENCOUNTER — OFFICE VISIT (OUTPATIENT)
Dept: MEDICAL GROUP | Facility: MEDICAL CENTER | Age: 68
End: 2023-04-04
Payer: MEDICARE

## 2023-04-04 VITALS
DIASTOLIC BLOOD PRESSURE: 80 MMHG | HEART RATE: 65 BPM | SYSTOLIC BLOOD PRESSURE: 142 MMHG | TEMPERATURE: 97.8 F | RESPIRATION RATE: 16 BRPM | HEIGHT: 62 IN | BODY MASS INDEX: 25.96 KG/M2 | WEIGHT: 141.09 LBS | OXYGEN SATURATION: 97 %

## 2023-04-04 DIAGNOSIS — R03.0 ELEVATED BLOOD PRESSURE READING: ICD-10-CM

## 2023-04-04 DIAGNOSIS — R76.8 ANTI-TPO ANTIBODIES PRESENT: ICD-10-CM

## 2023-04-04 DIAGNOSIS — R73.01 IMPAIRED FASTING BLOOD SUGAR: ICD-10-CM

## 2023-04-04 DIAGNOSIS — Z12.11 SCREENING FOR COLORECTAL CANCER: ICD-10-CM

## 2023-04-04 DIAGNOSIS — E78.2 MIXED HYPERLIPIDEMIA: ICD-10-CM

## 2023-04-04 DIAGNOSIS — R09.82 POSTNASAL DRIP: ICD-10-CM

## 2023-04-04 DIAGNOSIS — R09.89 THROAT FULLNESS: ICD-10-CM

## 2023-04-04 DIAGNOSIS — Z12.12 SCREENING FOR COLORECTAL CANCER: ICD-10-CM

## 2023-04-04 PROBLEM — E78.00 ELEVATED LOW DENSITY LIPOPROTEIN (LDL) CHOLESTEROL LEVEL: Status: RESOLVED | Noted: 2021-04-22 | Resolved: 2023-04-04

## 2023-04-04 PROBLEM — R09.81 SINUS CONGESTION: Status: RESOLVED | Noted: 2022-03-01 | Resolved: 2023-04-04

## 2023-04-04 PROCEDURE — 99214 OFFICE O/P EST MOD 30 MIN: CPT | Performed by: FAMILY MEDICINE

## 2023-04-04 RX ORDER — MONTELUKAST SODIUM 10 MG/1
10 TABLET ORAL DAILY
Qty: 90 TABLET | Refills: 3 | Status: SHIPPED | OUTPATIENT
Start: 2023-04-04

## 2023-04-04 RX ORDER — AZELASTINE 1 MG/ML
1 SPRAY, METERED NASAL 2 TIMES DAILY
Qty: 30 ML | Refills: 1 | Status: SHIPPED | OUTPATIENT
Start: 2023-04-04 | End: 2023-04-28

## 2023-04-04 ASSESSMENT — ENCOUNTER SYMPTOMS
PALPITATIONS: 0
SINUS PAIN: 1
WHEEZING: 0
CHILLS: 0
BLOOD IN STOOL: 0
DIARRHEA: 0
ABDOMINAL PAIN: 0
CONSTIPATION: 0
VOMITING: 0
FEVER: 0
SHORTNESS OF BREATH: 0
COUGH: 1

## 2023-04-04 ASSESSMENT — FIBROSIS 4 INDEX: FIB4 SCORE: 0.62

## 2023-04-04 NOTE — PROGRESS NOTES
20 new patient FAMILY MEDICINE VISIT                                                               Chief complaint::Diagnoses of Postnasal drip, Anti-TPO antibodies present, Mixed hyperlipidemia, Impaired fasting blood sugar, Throat fullness, Screening for colorectal cancer, and Elevated blood pressure reading were pertinent to this visit.    History of present illness: Nita Sepulveda is a 67 y.o. female who presented to establish care, throat fullness, postnasal drip, left follow-up.    Problem   Postnasal Drip    She has history of chronic sinusitis, gets sinus congestion, postnasal drip.  She has throat fullness from this mucus that is going on.  She has tried Flonase nasal spray, [place] pot before.  These are not effective.  She was seen by ENT also     Anti-Tpo Antibodies Present    She recently had thyroid test done for throat fullness.  TPO antibodies came back positive at 23.0.  Thyroid function test came back normal.  She has family history of Hashimoto's thyroiditis in her son.     Mixed Hyperlipidemia        Recent upper panel showed elevated total cholesterol and LDL level, these numbers are getting better from previous numbers.    The 10-year ASCVD risk score (Trever JONES, et al., 2019) is: 8.5%  Lab Results   Component Value Date/Time    CHOLSTRLTOT 210 (H) 03/31/2023 0751    TRIGLYCERIDE 137 03/31/2023 0751    HDL 56 03/31/2023 0751     (H) 03/31/2023 0751        Impaired Fasting Blood Sugar    Recent fasting glucose came back elevated at 100.  No previous history of diabetes or prediabetes.    Component      Latest Ref Rng 3/29/2021 3/31/2023   Glucose      65 - 99 mg/dL 102 (H)  100 (H)       (H) High          Elevated Blood Pressure Reading    Her blood pressure today came back elevated at 142/80, initial blood pressure was much higher.  She reports that it was due to driving here.  Her mother passed away last week           Review of systems:     Review of Systems   Constitutional:   "Negative for chills, fever and malaise/fatigue.   HENT:  Positive for congestion and sinus pain.         Postnasal drip, throat fullness   Respiratory:  Positive for cough. Negative for shortness of breath and wheezing.    Cardiovascular:  Negative for chest pain, palpitations and leg swelling.   Gastrointestinal:  Negative for abdominal pain, blood in stool, constipation, diarrhea and vomiting.      Medications and Allergies:     Current Outpatient Medications   Medication Sig Dispense Refill    azelastine (ASTELIN) 137 MCG/SPRAY nasal spray Administer 1 Spray into affected nostril(S) 2 times a day. 30 mL 1    montelukast (SINGULAIR) 10 MG Tab Take 1 Tablet by mouth every day. 90 Tablet 3    pantoprazole (PROTONIX) 20 MG tablet Take 1 Tablet by mouth every day. 90 Tablet 3    Pseudoephedrine-DM-GG (ROBITUSSIN COLD & COUGH PO) Take  by mouth.      ibuprofen (MOTRIN) 200 MG Tab Take 200 mg by mouth every 6 hours as needed.      cyclobenzaprine (FLEXERIL) 5 mg tablet Take 1 Tablet by mouth 3 times a day as needed for Muscle Spasms. 30 Tablet 0    vitamin D3 (CHOLECALCIFEROL) 1000 Unit (25 mcg) Tab Take 1,000 Units by mouth every day.      Multiple Vitamins-Minerals (MULTIVITAMIN ADULT PO) Take 1 Tablet by mouth every day.       No current facility-administered medications for this visit.          Vitals:    BP (!) 142/80   Pulse 65   Temp 36.6 °C (97.8 °F)   Resp 16   Ht 1.575 m (5' 2\")   Wt 64 kg (141 lb 1.5 oz)   SpO2 97%  Body mass index is 25.81 kg/m².    Physical Exam:     Physical Exam  Constitutional:       Appearance: Normal appearance. She is well-developed and well-groomed.   HENT:      Head: Normocephalic and atraumatic.      Right Ear: There is no impacted cerumen. Tympanic membrane is bulging.      Left Ear: There is no impacted cerumen. Tympanic membrane is bulging.      Nose: Congestion present.      Mouth/Throat:      Mouth: Mucous membranes are moist.      Pharynx: Oropharynx is clear. No " oropharyngeal exudate or posterior oropharyngeal erythema.   Eyes:      Conjunctiva/sclera: Conjunctivae normal.   Cardiovascular:      Rate and Rhythm: Normal rate and regular rhythm.      Heart sounds: Normal heart sounds. No murmur heard.    No friction rub. No gallop.   Pulmonary:      Effort: Pulmonary effort is normal. No respiratory distress.      Breath sounds: Normal breath sounds. No wheezing or rales.   Musculoskeletal:         General: No deformity.      Cervical back: Neck supple.   Neurological:      Mental Status: She is alert.      Gait: Gait is intact.   Psychiatric:         Mood and Affect: Mood and affect normal.         Behavior: Behavior normal.        Labs:  I reviewed with patient recent labs resulted on 3/31/2023    Assessment/Plan:         Problem List Items Addressed This Visit       Throat fullness  Chronic problem, unstable, symptoms are due to postnasal drip.  Start azelastine and Singulair.    Relevant Orders    TSH    FREE THYROXINE    Postnasal drip     Chronic problem, unstable, start singular 10 mg daily and azelastine nasal spray 2 times daily.  If symptoms are not getting better, will check CT sinuses.         Relevant Medications    azelastine (ASTELIN) 137 MCG/SPRAY nasal spray    montelukast (SINGULAIR) 10 MG Tab    Mixed hyperlipidemia     Chronic problem, unstable, cholesterol numbers are getting better, continue to work on diet and exercise.  Recheck labs in 6 months to assess control.  Will recalculate ASCVD risk at that time         Relevant Orders    Lipid Profile    Impaired fasting blood sugar     Chronic problem, borderline elevated fasting glucose, check A1c with next blood work         Relevant Orders    Comp Metabolic Panel    HEMOGLOBIN A1C    Elevated blood pressure reading     New problem, unstable, recommended to monitor blood pressure at home.  Goal blood pressures less than 140/90.         Anti-TPO antibodies present     New problem, unstable, recheck thyroid  function test in 6 months.  Check thyroid ultrasound         Relevant Orders    US-THYROID    TSH    FREE THYROXINE     Other Visit Diagnoses       Screening for colorectal cancer        Relevant Orders    Referral to GI for Colonoscopy             Please note that this dictation was created using voice recognition software. I have made every reasonable attempt to correct obvious errors, but I expect that there are errors of grammar and possibly content that I did not discover before finalizing the note.    Follow up in 2-month for annual wellness visit, ultrasound follow-up.

## 2023-04-04 NOTE — ASSESSMENT & PLAN NOTE
Chronic problem, unstable, cholesterol numbers are getting better, continue to work on diet and exercise.  Recheck labs in 6 months to assess control.  Will recalculate ASCVD risk at that time

## 2023-04-04 NOTE — ASSESSMENT & PLAN NOTE
Chronic problem, unstable, start singular 10 mg daily and azelastine nasal spray 2 times daily.  If symptoms are not getting better, will check CT sinuses.

## 2023-04-04 NOTE — ASSESSMENT & PLAN NOTE
New problem, unstable, recommended to monitor blood pressure at home.  Goal blood pressures less than 140/90.

## 2023-04-15 ENCOUNTER — HOSPITAL ENCOUNTER (OUTPATIENT)
Dept: RADIOLOGY | Facility: MEDICAL CENTER | Age: 68
End: 2023-04-15
Attending: FAMILY MEDICINE
Payer: MEDICARE

## 2023-04-15 DIAGNOSIS — R76.8 ANTI-TPO ANTIBODIES PRESENT: ICD-10-CM

## 2023-04-15 PROCEDURE — 76536 US EXAM OF HEAD AND NECK: CPT

## 2023-04-28 DIAGNOSIS — R09.82 POSTNASAL DRIP: ICD-10-CM

## 2023-04-28 RX ORDER — AZELASTINE HYDROCHLORIDE 137 UG/1
SPRAY, METERED NASAL
Qty: 30 ML | Refills: 1 | Status: SHIPPED | OUTPATIENT
Start: 2023-04-28 | End: 2023-05-30

## 2023-05-04 ENCOUNTER — OFFICE VISIT (OUTPATIENT)
Dept: MEDICAL GROUP | Facility: MEDICAL CENTER | Age: 68
End: 2023-05-04
Payer: MEDICARE

## 2023-05-04 VITALS
OXYGEN SATURATION: 95 % | SYSTOLIC BLOOD PRESSURE: 162 MMHG | BODY MASS INDEX: 26.37 KG/M2 | HEART RATE: 72 BPM | WEIGHT: 143.3 LBS | HEIGHT: 62 IN | RESPIRATION RATE: 16 BRPM | DIASTOLIC BLOOD PRESSURE: 80 MMHG | TEMPERATURE: 97.8 F

## 2023-05-04 DIAGNOSIS — Z78.0 ENCOUNTER FOR OSTEOPOROSIS SCREENING IN ASYMPTOMATIC POSTMENOPAUSAL PATIENT: ICD-10-CM

## 2023-05-04 DIAGNOSIS — R76.8 ANTI-TPO ANTIBODIES PRESENT: ICD-10-CM

## 2023-05-04 DIAGNOSIS — Z13.820 ENCOUNTER FOR OSTEOPOROSIS SCREENING IN ASYMPTOMATIC POSTMENOPAUSAL PATIENT: ICD-10-CM

## 2023-05-04 DIAGNOSIS — Z12.31 ENCOUNTER FOR SCREENING MAMMOGRAM FOR MALIGNANT NEOPLASM OF BREAST: ICD-10-CM

## 2023-05-04 DIAGNOSIS — I10 PRIMARY HYPERTENSION: ICD-10-CM

## 2023-05-04 PROCEDURE — 99214 OFFICE O/P EST MOD 30 MIN: CPT | Performed by: FAMILY MEDICINE

## 2023-05-04 RX ORDER — LOSARTAN POTASSIUM 25 MG/1
25 TABLET ORAL DAILY
Qty: 30 TABLET | Refills: 1 | Status: SHIPPED | OUTPATIENT
Start: 2023-05-04 | End: 2023-05-15 | Stop reason: SDUPTHER

## 2023-05-04 ASSESSMENT — ENCOUNTER SYMPTOMS
CHILLS: 0
PALPITATIONS: 0
FEVER: 0

## 2023-05-04 ASSESSMENT — FIBROSIS 4 INDEX: FIB4 SCORE: 0.62

## 2023-05-04 NOTE — ASSESSMENT & PLAN NOTE
Chronic problem, stable, very small mass 7 mm at upper pole of thyroid seen, thyroid function test is normal.  Continue to monitor for any symptoms.  Recheck thyroid ultrasound in 1 year

## 2023-05-04 NOTE — ASSESSMENT & PLAN NOTE
Chronic problem, unstable, start losartan 25 mg daily.  Monitor blood pressure at home.  Bring blood pressure log at next visit.  Follow-up in 6 weeks for blood pressure follow-up

## 2023-05-04 NOTE — PROGRESS NOTES
FAMILY MEDICINE VISIT                                                               Chief complaint::Diagnoses of Primary hypertension, Encounter for screening mammogram for malignant neoplasm of breast, Encounter for osteoporosis screening in asymptomatic postmenopausal patient, and Anti-TPO antibodies present were pertinent to this visit.    History of present illness: Nita Sepulveda is a 67 y.o. female who presented for ultrasound follow-up, elevated blood pressure.    Problem   Anti-Tpo Antibodies Present    She recently had thyroid test done for throat fullness.  TPO antibodies came back positive at 23.0.  Thyroid function test came back normal.  She has family history of Hashimoto's thyroiditis in her son.    We checked ultrasound thyroid which showed below findings 4/15/2023  1.  No significant thyroid nodule or mass identified.     2.  Echotexture is homogeneous with no thyroid enlargement     Primary Hypertension    Blood pressure today came back at 162/80, last blood pressure was elevated also.  She has been checking blood pressure at home and it has been in 140s/80s range.  She had eye exam done which showed hypertensive retinopathy.            Review of systems:     Review of Systems   Constitutional:  Negative for chills, fever and malaise/fatigue.   Cardiovascular:  Negative for chest pain, palpitations and leg swelling.      Medications and Allergies:     Current Outpatient Medications   Medication Sig Dispense Refill    losartan (COZAAR) 25 MG Tab Take 1 Tablet by mouth every day. 30 Tablet 1    Azelastine HCl 137 MCG/SPRAY Solution ADMINISTER 1 SPRAY INTO AFFECTED NOSTRIL(S) 2 TIMES A DAY. 30 mL 1    montelukast (SINGULAIR) 10 MG Tab Take 1 Tablet by mouth every day. 90 Tablet 3    pantoprazole (PROTONIX) 20 MG tablet Take 1 Tablet by mouth every day. 90 Tablet 3    Pseudoephedrine-DM-GG (ROBITUSSIN COLD & COUGH PO) Take  by mouth.      ibuprofen (MOTRIN) 200 MG Tab Take 200 mg by mouth every 6  "hours as needed.      cyclobenzaprine (FLEXERIL) 5 mg tablet Take 1 Tablet by mouth 3 times a day as needed for Muscle Spasms. 30 Tablet 0    vitamin D3 (CHOLECALCIFEROL) 1000 Unit (25 mcg) Tab Take 1,000 Units by mouth every day.      Multiple Vitamins-Minerals (MULTIVITAMIN ADULT PO) Take 1 Tablet by mouth every day.       No current facility-administered medications for this visit.          Vitals:    BP (!) 162/80   Pulse 72   Temp 36.6 °C (97.8 °F)   Resp 16   Ht 1.575 m (5' 2\")   Wt 65 kg (143 lb 4.8 oz)   SpO2 95%  Body mass index is 26.21 kg/m².    Physical Exam:     Physical Exam  Constitutional:       Appearance: Normal appearance. She is well-developed and well-groomed.   HENT:      Head: Normocephalic and atraumatic.      Right Ear: External ear normal.      Left Ear: External ear normal.   Eyes:      General:         Right eye: No discharge.         Left eye: No discharge.      Conjunctiva/sclera: Conjunctivae normal.   Cardiovascular:      Rate and Rhythm: Normal rate.   Pulmonary:      Effort: Pulmonary effort is normal. No respiratory distress.   Musculoskeletal:      Cervical back: Neck supple.   Skin:     Findings: No rash.   Neurological:      Mental Status: She is alert.   Psychiatric:         Mood and Affect: Mood and affect normal.         Behavior: Behavior normal.        US-THYROID  Narrative: 4/15/2023 10:15 AM    HISTORY/REASON FOR EXAM:  Anti-TPO antibodies    TECHNIQUE/EXAM DESCRIPTION:  Ultrasound of the soft tissues of the head and neck.    COMPARISON:  None    FINDINGS:  The thyroid gland is heterogeneous.  Vascularity is normal.    The right lobe of the thyroid gland measures 1.10 cm x 4.57 cm x 1.38 cm. The contour and echogenicity are normal. No focal mass lesions are identified.  The left lobe of the thyroid gland measures 1.21 cm x 3.90 cm x 1.32 cm. The contour and echogenicity are normal. No significant focal mass lesions are identified. Small echogenic 7 mm mass is noted " in the upper pole.  The isthmus measures 0.36 cm.  Impression: 1.  No significant thyroid nodule or mass identified.    2.  Echotexture is homogeneous with no thyroid enlargement.       Assessment/Plan:         Problem List Items Addressed This Visit       Primary hypertension     Chronic problem, unstable, start losartan 25 mg daily.  Monitor blood pressure at home.  Bring blood pressure log at next visit.  Follow-up in 6 weeks for blood pressure follow-up         Relevant Medications    losartan (COZAAR) 25 MG Tab    Anti-TPO antibodies present     Chronic problem, stable, very small mass 7 mm at upper pole of thyroid seen, thyroid function test is normal.  Continue to monitor for any symptoms.  Recheck thyroid ultrasound in 1 year          Other Visit Diagnoses       Encounter for screening mammogram for malignant neoplasm of breast        Relevant Orders    MA-SCREENING MAMMO BILAT W/TOMOSYNTHESIS W/CAD    Encounter for osteoporosis screening in asymptomatic postmenopausal patient        Relevant Orders    DS-BONE DENSITY STUDY (DEXA)             Please note that this dictation was created using voice recognition software. I have made every reasonable attempt to correct obvious errors, but I expect that there are errors of grammar and possibly content that I did not discover before finalizing the note.    Follow up in 6/13/2023 for blood pressure follow-up.

## 2023-05-15 DIAGNOSIS — I10 PRIMARY HYPERTENSION: ICD-10-CM

## 2023-05-15 RX ORDER — LOSARTAN POTASSIUM 25 MG/1
25 TABLET ORAL DAILY
Qty: 90 TABLET | Refills: 3 | Status: SHIPPED | OUTPATIENT
Start: 2023-05-15 | End: 2024-03-21

## 2023-05-15 NOTE — TELEPHONE ENCOUNTER
Received request via: Patient    Was the patient seen in the last year in this department? Yes    Does the patient have an active prescription (recently filled or refills available) for medication(s) requested? No    Does the patient have half-way Plus and need 100 day supply (blood pressure, diabetes and cholesterol meds only)? Medication is not for cholesterol, blood pressure or diabetes

## 2023-05-27 DIAGNOSIS — R09.82 POSTNASAL DRIP: ICD-10-CM

## 2023-05-30 RX ORDER — AZELASTINE HYDROCHLORIDE 137 UG/1
SPRAY, METERED NASAL
Qty: 30 ML | Refills: 1 | Status: SHIPPED | OUTPATIENT
Start: 2023-05-30 | End: 2023-11-28

## 2023-06-13 ENCOUNTER — HOSPITAL ENCOUNTER (OUTPATIENT)
Facility: MEDICAL CENTER | Age: 68
End: 2023-06-13
Attending: FAMILY MEDICINE
Payer: MEDICARE

## 2023-06-13 ENCOUNTER — OFFICE VISIT (OUTPATIENT)
Dept: MEDICAL GROUP | Facility: MEDICAL CENTER | Age: 68
End: 2023-06-13
Payer: MEDICARE

## 2023-06-13 VITALS
HEART RATE: 60 BPM | BODY MASS INDEX: 25.58 KG/M2 | WEIGHT: 139 LBS | TEMPERATURE: 97 F | HEIGHT: 62 IN | SYSTOLIC BLOOD PRESSURE: 124 MMHG | OXYGEN SATURATION: 96 % | DIASTOLIC BLOOD PRESSURE: 72 MMHG

## 2023-06-13 DIAGNOSIS — I10 PRIMARY HYPERTENSION: ICD-10-CM

## 2023-06-13 DIAGNOSIS — R09.89 THROAT FULLNESS: ICD-10-CM

## 2023-06-13 DIAGNOSIS — N39.0 RECURRENT UTI: ICD-10-CM

## 2023-06-13 DIAGNOSIS — K25.7 CHRONIC GASTRIC ULCER WITHOUT HEMORRHAGE AND WITHOUT PERFORATION: ICD-10-CM

## 2023-06-13 DIAGNOSIS — K64.8 OTHER HEMORRHOIDS: ICD-10-CM

## 2023-06-13 DIAGNOSIS — R09.82 POSTNASAL DRIP: ICD-10-CM

## 2023-06-13 DIAGNOSIS — G89.29 CHRONIC PAIN OF LEFT KNEE: ICD-10-CM

## 2023-06-13 DIAGNOSIS — M25.562 CHRONIC PAIN OF LEFT KNEE: ICD-10-CM

## 2023-06-13 DIAGNOSIS — R76.8 ANTI-TPO ANTIBODIES PRESENT: ICD-10-CM

## 2023-06-13 DIAGNOSIS — H35.033 HYPERTENSIVE RETINOPATHY OF BOTH EYES: ICD-10-CM

## 2023-06-13 DIAGNOSIS — R20.0 NUMBNESS AND TINGLING OF RIGHT ARM: ICD-10-CM

## 2023-06-13 DIAGNOSIS — Z00.00 MEDICARE ANNUAL WELLNESS VISIT, SUBSEQUENT: ICD-10-CM

## 2023-06-13 DIAGNOSIS — R20.2 NUMBNESS AND TINGLING OF RIGHT ARM: ICD-10-CM

## 2023-06-13 DIAGNOSIS — R73.01 IMPAIRED FASTING BLOOD SUGAR: ICD-10-CM

## 2023-06-13 DIAGNOSIS — E78.2 MIXED HYPERLIPIDEMIA: ICD-10-CM

## 2023-06-13 PROBLEM — M54.50 ACUTE BILATERAL LOW BACK PAIN WITHOUT SCIATICA: Status: RESOLVED | Noted: 2022-11-22 | Resolved: 2023-06-13

## 2023-06-13 PROBLEM — M79.601 RIGHT ARM PAIN: Status: RESOLVED | Noted: 2022-03-01 | Resolved: 2023-06-13

## 2023-06-13 LAB
APPEARANCE UR: CLEAR
BILIRUB UR STRIP-MCNC: NORMAL MG/DL
COLOR UR AUTO: YELLOW
GLUCOSE UR STRIP.AUTO-MCNC: NORMAL MG/DL
KETONES UR STRIP.AUTO-MCNC: NORMAL MG/DL
LEUKOCYTE ESTERASE UR QL STRIP.AUTO: NORMAL
NITRITE UR QL STRIP.AUTO: NORMAL
PH UR STRIP.AUTO: 6.5 [PH] (ref 5–8)
PROT UR QL STRIP: NORMAL MG/DL
RBC UR QL AUTO: NORMAL
SP GR UR STRIP.AUTO: 1.01
UROBILINOGEN UR STRIP-MCNC: 0.2 MG/DL

## 2023-06-13 PROCEDURE — 3078F DIAST BP <80 MM HG: CPT | Performed by: FAMILY MEDICINE

## 2023-06-13 PROCEDURE — 81002 URINALYSIS NONAUTO W/O SCOPE: CPT | Performed by: FAMILY MEDICINE

## 2023-06-13 PROCEDURE — G0439 PPPS, SUBSEQ VISIT: HCPCS | Performed by: FAMILY MEDICINE

## 2023-06-13 PROCEDURE — 87086 URINE CULTURE/COLONY COUNT: CPT

## 2023-06-13 PROCEDURE — 3074F SYST BP LT 130 MM HG: CPT | Performed by: FAMILY MEDICINE

## 2023-06-13 ASSESSMENT — ACTIVITIES OF DAILY LIVING (ADL): BATHING_REQUIRES_ASSISTANCE: 0

## 2023-06-13 ASSESSMENT — FIBROSIS 4 INDEX: FIB4 SCORE: 0.62

## 2023-06-13 ASSESSMENT — PATIENT HEALTH QUESTIONNAIRE - PHQ9: CLINICAL INTERPRETATION OF PHQ2 SCORE: 0

## 2023-06-13 ASSESSMENT — ENCOUNTER SYMPTOMS: GENERAL WELL-BEING: GOOD

## 2023-06-13 NOTE — PROGRESS NOTES
Chief Complaint   Patient presents with    Annual Exam       HPI:  Nita Sepulveda is a 67 y.o. here for Medicare Annual Wellness Visit     Patient Active Problem List    Diagnosis Date Noted    Hypertensive retinopathy of both eyes 06/13/2023    Postnasal drip 04/04/2023    Anti-TPO antibodies present 04/04/2023    Mixed hyperlipidemia 04/04/2023    Impaired fasting blood sugar 04/04/2023    Throat fullness 04/04/2023    Primary hypertension 04/04/2023    Other hemorrhoids 03/01/2022    Chronic pain of left knee 03/16/2021    Chronic gastric ulcer without hemorrhage and without perforation 01/20/2021    Recurrent UTI 08/30/2011       Current Outpatient Medications   Medication Sig Dispense Refill    Azelastine HCl 137 MCG/SPRAY Solution ADMINISTER 1 SPRAY INTO AFFECTED NOSTRIL(S) 2 TIMES A DAY. 30 mL 1    losartan (COZAAR) 25 MG Tab Take 1 Tablet by mouth every day. 90 Tablet 3    montelukast (SINGULAIR) 10 MG Tab Take 1 Tablet by mouth every day. 90 Tablet 3    pantoprazole (PROTONIX) 20 MG tablet Take 1 Tablet by mouth every day. 90 Tablet 3    ibuprofen (MOTRIN) 200 MG Tab Take 200 mg by mouth every 6 hours as needed.      cyclobenzaprine (FLEXERIL) 5 mg tablet Take 1 Tablet by mouth 3 times a day as needed for Muscle Spasms. 30 Tablet 0    vitamin D3 (CHOLECALCIFEROL) 1000 Unit (25 mcg) Tab Take 1,000 Units by mouth every day.      Multiple Vitamins-Minerals (MULTIVITAMIN ADULT PO) Take 1 Tablet by mouth every day.       No current facility-administered medications for this visit.          Current supplements as per medication list.     Allergies: Acyclovir and Hydrocodone-acetaminophen    Current social contact/activities: Spends time with family and friends    She  reports that she has quit smoking. She has never used smokeless tobacco. She reports that she does not currently use alcohol. She reports that she does not use drugs.  Counseling given: Not Answered  Tobacco comments: Never      ROS:    Gait: Uses  no assistive device  Ostomy: No  Other tubes: No  Amputations: No  Chronic oxygen use: No  Last eye exam: July 2022  Wears hearing aids: No   : Denies any urinary leakage during the last 6 months    Screening:    Depression Screening  Little interest or pleasure in doing things?  0 - not at all  Feeling down, depressed , or hopeless? 0 - not at all  Patient Health Questionnaire Score: 0     If depressive symptoms identified deferred to follow up visit unless specifically addressed in assessment and plan.    Interpretation of PHQ-9 Total Score   Score Severity   1-4 No Depression   5-9 Mild Depression   10-14 Moderate Depression   15-19 Moderately Severe Depression   20-27 Severe Depression    Screening for Cognitive Impairment  Three Minute Recall (daughter, heaven, mountain) 3/3    Alen clock face with all 12 numbers and set the hands to show 10 past 11.  Yes    Cognitive concerns identified deferred for follow up unless specifically addressed in assessment and plan.    Fall Risk Assessment  Has the patient had two or more falls in the last year or any fall with injury in the last year?  No    Safety Assessment  Throw rugs on floor.  Yes  Handrails on all stairs.  No  Good lighting in all hallways.  Yes  Difficulty hearing.  No  Patient counseled about all safety risks that were identified.    Functional Assessment ADLs  Are there any barriers preventing you from cooking for yourself or meeting nutritional needs?  No.    Are there any barriers preventing you from driving safely or obtaining transportation?  No.    Are there any barriers preventing you from using a telephone or calling for help?  No.    Are there any barriers preventing you from shopping?  No.    Are there any barriers preventing you from taking care of your own finances?  No.    Are there any barriers preventing you from managing your medications?  No.    Are there any barriers preventing you from showering, bathing or dressing yourself?  No.     Are you currently engaging in any exercise or physical activity?  Yes.     What is your perception of your health?  Good    Advance Care Planning  Do you have an Advance Directive, Living Will, Durable Power of , or POLST? Yes    Living Will     is not on file - instructed patient to bring in a copy to scan into their chart      Health Maintenance Summary            Scheduled - BONE DENSITY (Every 5 Years) Scheduled for 7/13/2023      No completion history exists for this topic.              Ordered - COLORECTAL CANCER SCREENING (COLONOSCOPY - Every 10 Years) Ordered on 4/4/2023      No completion history exists for this topic.              Overdue - IMM ZOSTER VACCINES (1 of 2) Overdue since 8/2/2011 06/07/2011  Imm Admin: Varicella Vaccine Live              Overdue - COVID-19 Vaccine (3 - Pfizer series) Overdue since 10/26/2021      08/31/2021  Imm Admin: PFIZER PURPLE CAP SARS-COV-2 VACCINATION (12+)    08/04/2021  Imm Admin: PFIZER PURPLE CAP SARS-COV-2 VACCINATION (12+)              Postponed - IMM PNEUMOCOCCAL VACCINE: 65+ Years (2 - PCV) Postponed until 9/6/2023 11/19/2020  Imm Admin: Pneumococcal polysaccharide vaccine (PPSV-23)              Scheduled - MAMMOGRAM (Every 2 Years) Scheduled for 7/13/2023 05/03/2022  MA-SCREENING MAMMO BILAT W/TOMOSYNTHESIS W/CAD    04/12/2021  MA-SCREENING MAMMO BILAT W/TOMOSYNTHESIS W/CAD    04/20/2012  MA-SCREENING MAMMO BILAT W/TOMOSYNTHESIS W/CAD              Annual Wellness Visit (Every 366 Days) Next due on 6/13/2024 06/13/2023  Visit Dx: Medicare annual wellness visit, subsequent    06/13/2023  Level of Service: ANNUAL WELLNESS VISIT-INCLUDES PPPS SUBSEQUE*    12/01/2021  Done              IMM DTaP/Tdap/Td Vaccine (3 - Td or Tdap) Next due on 12/25/2028 12/25/2018  Imm Admin: Dtap Vaccine    10/12/2010  Imm Admin: Tdap Vaccine    05/28/2004  Imm Admin: TD Vaccine              IMM INFLUENZA (Series Information) Completed       11/01/2022  Imm Admin: Influenza Vaccine Adult HD    12/01/2021  Imm Admin: Influenza Vaccine Adult HD    11/19/2020  Imm Admin: Influenza Vaccine Adult HD    11/07/2019  Imm Admin: Influenza Vaccine Quad Inj (Preserved)    10/24/2018  Imm Admin: Influenza Vaccine Quad Inj (Pf)    Only the first 5 history entries have been loaded, but more history exists.              IMM HEP B VACCINE (Series Information) Aged Out      No completion history exists for this topic.              HPV Vaccines (Series Information) Aged Out      No completion history exists for this topic.              IMM MENINGOCOCCAL ACWY VACCINE (Series Information) Aged Out      No completion history exists for this topic.              Discontinued - HEPATITIS C SCREENING  Discontinued      No completion history exists for this topic.                    Patient Care Team:  Gael Patel M.D. as PCP - General (Family Medicine)  JUAN J Montejo as PCP - [facility] Paneled (Nurse Practitioner Primary Care)        Social History     Tobacco Use    Smoking status: Former    Smokeless tobacco: Never    Tobacco comments:     Never   Vaping Use    Vaping Use: Never used   Substance Use Topics    Alcohol use: Not Currently     Comment: occ    Drug use: Never     Family History   Problem Relation Age of Onset    Psychiatric Illness Mother     Cancer Father     Heart Disease Father     Cancer Sister         breast    Diabetes Son     Thyroid Son      She  has a past medical history of Allergic rhinitis (06/02/2005), Arthritis, Dental infection (01/20/2021), Dysuria (08/30/2011), Establishing care with new doctor, encounter for (01/20/2021), Hemangioma of other sites (04/26/2010), Herpes zoster without complication (01/20/2021), Iron deficiency anemia (01/20/2011), Nocturia (08/30/2011), Pelvic pressure in female (10/06/2021), Sinus pressure (10/06/2021), Urgency of urination (08/30/2011), Vaginal itching (09/17/2021), and Vitiligo (05/28/2004).   Past  "Surgical History:   Procedure Laterality Date    TUBAL COAGULATION LAPAROSCOPIC BILATERAL      TUBAL LIGATION         Exam:   /72   Pulse 60   Temp 36.1 °C (97 °F) (Temporal)   Ht 1.575 m (5' 2\")   Wt 63 kg (139 lb)   SpO2 96%  Body mass index is 25.42 kg/m².    Hearing excellent.    Dentition good  Alert, oriented in no acute distress.  Eye contact is good, speech goal directed, affect calm    Assessment and Plan. The following treatment and monitoring plan is recommended:      1. Medicare annual wellness visit, subsequent    2. Recurrent UTI  - POCT Urinalysis  - URINE CULTURE(NEW); Future    3. Chronic gastric ulcer without hemorrhage and without perforation    4. Chronic pain of left knee    5. Other hemorrhoids    6. Numbness and tingling of right arm    7. Postnasal drip    8. Anti-TPO antibodies present    9. Mixed hyperlipidemia    10. Impaired fasting blood sugar    11. Throat fullness    12. Primary hypertension    13. Hypertensive retinopathy of both eyes      Problem   Hypertensive Retinopathy of Both Eyes    Blood pressure is now controlled with losartan 20 mg daily.  Follow-up with ophthalmology for monitoring of hypertensive retinopathy     Postnasal Drip    She has history of chronic sinusitis, gets sinus congestion, postnasal drip.  She has throat fullness from this mucus that is going on.  She is on azelastine nasal spray and Singulair which is helping with the symptoms.     Anti-Tpo Antibodies Present    She recently had thyroid test done for throat fullness.  TPO antibodies came back positive at 23.0.  Thyroid function test came back normal.  She has family history of Hashimoto's thyroiditis in her son.    We checked ultrasound thyroid which showed below findings 4/15/2023  1.  No significant thyroid nodule or mass identified.     2.  Echotexture is homogeneous with no thyroid enlargement.     Recheck thyroid ultrasound in 1 year.     Mixed Hyperlipidemia        Recent upper panel " showed elevated total cholesterol and LDL level, these numbers are getting better from previous numbers.  ASCVD risk is elevated.  We discussed about this, repeat labs in 6 months if not getting better we will start her on statin    The 10-year ASCVD risk score (Trever JONES, et al., 2019) is: 8.8%    Lab Results   Component Value Date/Time    CHOLSTRLTOT 210 (H) 03/31/2023 0751    TRIGLYCERIDE 137 03/31/2023 0751    HDL 56 03/31/2023 0751     (H) 03/31/2023 0751        Throat Fullness    Has postnasal drip due to allergies.  Uses azelastine nasal spray and Singulair which helps with the symptoms     Primary Hypertension    Blood pressure today came back at 124/72.  She had eye exam done which showed hypertensive retinopathy.  She is taking losartan 12 mg daily which is helping with controlling the symptoms.  No side effects with this medication     Other Hemorrhoids    Has history of hemorrhoids, currently not on medication.  Recommended to avoid constipation.  Drink plenty of fluids and add fiber in diet     Chronic Pain of Left Knee    Currently following with Ortho.     Chronic Gastric Ulcer Without Hemorrhage and Without Perforation    On Protonix 20 mg daily.  Denies any symptoms currently.     Recurrent Uti    Gets recurrent UTIs.  Takes Macrobid for UTIs.  She reports that she is having discoloration of her urine.  No other symptoms.  Point-of-care urinalysis in office showed trace leuks.  Send this for urine culture       Services suggested: No services needed at this time  Health Care Screening: Age-appropriate preventive services recommended by USPTF and ACIP covered by Medicare were discussed today. Services ordered if indicated and agreed upon by the patient.  Referrals offered: Community-based lifestyle interventions to reduce health risks and promote self-management and wellness, fall prevention, nutrition, physical activity, tobacco-use cessation, weight loss, and mental health services as per  orders if indicated.    Discussion today about general wellness and lifestyle habits:    Prevent falls and reduce trip hazards; Cautioned about securing or removing rugs.  Have a working fire alarm and carbon monoxide detector;   Engage in regular physical activity and social activities     Follow-up: Return in about 6 months (around 12/13/2023).

## 2023-06-15 LAB
BACTERIA UR CULT: NORMAL
SIGNIFICANT IND 70042: NORMAL
SITE SITE: NORMAL
SOURCE SOURCE: NORMAL

## 2023-06-16 ENCOUNTER — TELEPHONE (OUTPATIENT)
Dept: HEALTH INFORMATION MANAGEMENT | Facility: OTHER | Age: 68
End: 2023-06-16

## 2023-07-03 PROCEDURE — RXMED WILLOW AMBULATORY MEDICATION CHARGE: Performed by: NURSE PRACTITIONER

## 2023-07-05 ENCOUNTER — PHARMACY VISIT (OUTPATIENT)
Dept: PHARMACY | Facility: MEDICAL CENTER | Age: 68
End: 2023-07-05
Payer: COMMERCIAL

## 2023-07-14 ENCOUNTER — APPOINTMENT (OUTPATIENT)
Dept: RADIOLOGY | Facility: MEDICAL CENTER | Age: 68
End: 2023-07-14
Attending: FAMILY MEDICINE
Payer: MEDICARE

## 2023-07-14 DIAGNOSIS — Z12.31 ENCOUNTER FOR SCREENING MAMMOGRAM FOR MALIGNANT NEOPLASM OF BREAST: ICD-10-CM

## 2023-07-14 PROCEDURE — 77063 BREAST TOMOSYNTHESIS BI: CPT

## 2023-08-10 NOTE — PATIENT INSTRUCTIONS
1. Get labs completed.  These will be fasting, 8 to 10 hours prior to your labs.  Make sure that you drink lots of water.  We will discuss the results of these at our next appointment.     2. Refill sent for ointment    3.  Orders for DEXA scan and Mammogram.  If you do not hear from them in the next 3-5 business days please reach out to me through [employer].     4. Cologuard- open the box, read instructions ;) do and send out.           
Handoff

## 2023-08-25 ENCOUNTER — APPOINTMENT (OUTPATIENT)
Dept: MEDICAL GROUP | Facility: MEDICAL CENTER | Age: 68
End: 2023-08-25
Payer: MEDICARE

## 2023-08-29 ENCOUNTER — OFFICE VISIT (OUTPATIENT)
Dept: MEDICAL GROUP | Facility: MEDICAL CENTER | Age: 68
End: 2023-08-29
Payer: MEDICARE

## 2023-08-29 VITALS
BODY MASS INDEX: 25.56 KG/M2 | OXYGEN SATURATION: 98 % | DIASTOLIC BLOOD PRESSURE: 70 MMHG | TEMPERATURE: 97.9 F | HEIGHT: 62 IN | HEART RATE: 71 BPM | SYSTOLIC BLOOD PRESSURE: 132 MMHG | WEIGHT: 138.89 LBS | RESPIRATION RATE: 17 BRPM

## 2023-08-29 DIAGNOSIS — I10 PRIMARY HYPERTENSION: ICD-10-CM

## 2023-08-29 DIAGNOSIS — R09.82 POSTNASAL DRIP: ICD-10-CM

## 2023-08-29 DIAGNOSIS — J01.40 ACUTE NON-RECURRENT PANSINUSITIS: ICD-10-CM

## 2023-08-29 PROCEDURE — 99214 OFFICE O/P EST MOD 30 MIN: CPT | Performed by: FAMILY MEDICINE

## 2023-08-29 PROCEDURE — 3078F DIAST BP <80 MM HG: CPT | Performed by: FAMILY MEDICINE

## 2023-08-29 PROCEDURE — 3075F SYST BP GE 130 - 139MM HG: CPT | Performed by: FAMILY MEDICINE

## 2023-08-29 RX ORDER — METHYLPREDNISOLONE 4 MG/1
TABLET ORAL
Qty: 21 TABLET | Refills: 0 | Status: SHIPPED | OUTPATIENT
Start: 2023-08-29 | End: 2023-09-29 | Stop reason: SDUPTHER

## 2023-08-29 RX ORDER — AMOXICILLIN AND CLAVULANATE POTASSIUM 875; 125 MG/1; MG/1
1 TABLET, FILM COATED ORAL 2 TIMES DAILY
Qty: 14 TABLET | Refills: 0 | Status: SHIPPED | OUTPATIENT
Start: 2023-08-29 | End: 2023-09-05

## 2023-08-29 ASSESSMENT — ENCOUNTER SYMPTOMS
FEVER: 0
CHILLS: 0
SINUS PAIN: 1
BLOOD IN STOOL: 0
COUGH: 1
ABDOMINAL PAIN: 0
SORE THROAT: 1
DIARRHEA: 0
SHORTNESS OF BREATH: 0
PALPITATIONS: 0
WHEEZING: 0
CONSTIPATION: 0

## 2023-08-29 ASSESSMENT — FIBROSIS 4 INDEX: FIB4 SCORE: 0.62

## 2023-08-29 NOTE — PROGRESS NOTES
FAMILY MEDICINE VISIT                                                               Chief complaint::Diagnoses of Acute non-recurrent pansinusitis, Postnasal drip, and Primary hypertension were pertinent to this visit.    History of present illness: Nita Sepulveda is a 67 y.o. female who presented for sore throat, worsening sinus infection.    Her symptoms started about 1-1/2-week ago.  She is having sore throat, cough, sinus pain, loss of discharge, postnasal drip.  She has history of seasonal allergies.  She is taking singular, nasal spray, has tried Robitussin as well as ibuprofen.  Does not have any fever currently.    Has history of elevated blood pressure, currently on losartan 25 mg daily.  Blood pressure today came back at 132/70.  Reports home blood pressure readings are sometimes low 90s/60s range.      Review of systems:     Review of Systems   Constitutional:  Positive for malaise/fatigue. Negative for chills and fever.   HENT:  Positive for congestion, sinus pain and sore throat.    Respiratory:  Positive for cough. Negative for shortness of breath and wheezing.    Cardiovascular:  Negative for chest pain, palpitations and leg swelling.   Gastrointestinal:  Negative for abdominal pain, blood in stool, constipation and diarrhea.        Medications and Allergies:     Current Outpatient Medications   Medication Sig Dispense Refill    amoxicillin-clavulanate (AUGMENTIN) 875-125 MG Tab Take 1 Tablet by mouth 2 times a day for 7 days. 14 Tablet 0    methylPREDNISolone (MEDROL DOSEPAK) 4 MG Tablet Therapy Pack As directed on the packaging label. 21 Tablet 0    Azelastine HCl 137 MCG/SPRAY Solution ADMINISTER 1 SPRAY INTO AFFECTED NOSTRIL(S) 2 TIMES A DAY. 30 mL 1    losartan (COZAAR) 25 MG Tab Take 1 Tablet by mouth every day. 90 Tablet 3    montelukast (SINGULAIR) 10 MG Tab Take 1 Tablet by mouth every day. 90 Tablet 3    pantoprazole (PROTONIX) 20 MG tablet Take 1 Tablet by mouth every day. 90 Tablet 3     "ibuprofen (MOTRIN) 200 MG Tab Take 200 mg by mouth every 6 hours as needed.      cyclobenzaprine (FLEXERIL) 5 mg tablet Take 1 Tablet by mouth 3 times a day as needed for Muscle Spasms. 30 Tablet 0    vitamin D3 (CHOLECALCIFEROL) 1000 Unit (25 mcg) Tab Take 1,000 Units by mouth every day.      Multiple Vitamins-Minerals (MULTIVITAMIN ADULT PO) Take 1 Tablet by mouth every day.       No current facility-administered medications for this visit.          Vitals:    /70   Pulse 71   Temp 36.6 °C (97.9 °F)   Resp 17   Ht 1.575 m (5' 2\")   Wt 63 kg (138 lb 14.2 oz)   SpO2 98%  Body mass index is 25.4 kg/m².    Physical Exam:     Physical Exam  Constitutional:       Appearance: Normal appearance. She is well-developed and well-groomed.   HENT:      Head: Normocephalic and atraumatic.      Right Ear: Tympanic membrane, ear canal and external ear normal.      Left Ear: Tympanic membrane, ear canal and external ear normal.      Nose:      Right Sinus: Maxillary sinus tenderness and frontal sinus tenderness present.      Left Sinus: Maxillary sinus tenderness and frontal sinus tenderness present.      Mouth/Throat:      Pharynx: Posterior oropharyngeal erythema present.   Eyes:      General:         Right eye: No discharge.         Left eye: No discharge.      Conjunctiva/sclera: Conjunctivae normal.   Cardiovascular:      Rate and Rhythm: Normal rate and regular rhythm.      Heart sounds: Normal heart sounds. No murmur heard.     No friction rub. No gallop.   Pulmonary:      Effort: Pulmonary effort is normal. No respiratory distress.      Breath sounds: Normal breath sounds. No wheezing or rales.   Neurological:      General: No focal deficit present.      Mental Status: She is alert. Mental status is at baseline.      Gait: Gait is intact.   Psychiatric:         Mood and Affect: Mood and affect normal.         Behavior: Behavior normal.            Assessment/Plan:         1. Acute non-recurrent pansinusitis  New " problem, unstable, start Augmentin and Medrol Dosepak.  Recommended to use [place] pot.    - amoxicillin-clavulanate (AUGMENTIN) 875-125 MG Tab; Take 1 Tablet by mouth 2 times a day for 7 days.  Dispense: 14 Tablet; Refill: 0  - methylPREDNISolone (MEDROL DOSEPAK) 4 MG Tablet Therapy Pack; As directed on the packaging label.  Dispense: 21 Tablet; Refill: 0    2. Postnasal drip  Chronic problem, unstable, allergies are getting more worse.  Start Medrol Dosepak.    - methylPREDNISolone (MEDROL DOSEPAK) 4 MG Tablet Therapy Pack; As directed on the packaging label.  Dispense: 21 Tablet; Refill: 0    3. Primary hypertension  Chronic problem, stable, blood pressure is normal today.  Recommended to bring blood pressure monitor at next visit so that we can see if her blood pressure monitor is giving her correct readings or not.    Please note that this dictation was created using voice recognition software. I have made every reasonable attempt to correct obvious errors, but I expect that there are errors of grammar and possibly content that I did not discover before finalizing the note.    Follow up in 2 to 3 weeks for blood pressure follow-up.

## 2023-09-29 ENCOUNTER — OFFICE VISIT (OUTPATIENT)
Dept: MEDICAL GROUP | Facility: MEDICAL CENTER | Age: 68
End: 2023-09-29
Payer: MEDICARE

## 2023-09-29 VITALS
BODY MASS INDEX: 25.96 KG/M2 | OXYGEN SATURATION: 96 % | WEIGHT: 141.09 LBS | SYSTOLIC BLOOD PRESSURE: 136 MMHG | DIASTOLIC BLOOD PRESSURE: 70 MMHG | RESPIRATION RATE: 16 BRPM | HEIGHT: 62 IN | TEMPERATURE: 97.7 F | HEART RATE: 75 BPM

## 2023-09-29 DIAGNOSIS — T78.40XA ALLERGIC REACTION, INITIAL ENCOUNTER: ICD-10-CM

## 2023-09-29 DIAGNOSIS — Z23 NEED FOR VACCINATION: ICD-10-CM

## 2023-09-29 DIAGNOSIS — I10 PRIMARY HYPERTENSION: ICD-10-CM

## 2023-09-29 PROCEDURE — 99214 OFFICE O/P EST MOD 30 MIN: CPT | Mod: 25 | Performed by: FAMILY MEDICINE

## 2023-09-29 PROCEDURE — 90662 IIV NO PRSV INCREASED AG IM: CPT | Performed by: FAMILY MEDICINE

## 2023-09-29 PROCEDURE — 3078F DIAST BP <80 MM HG: CPT | Performed by: FAMILY MEDICINE

## 2023-09-29 PROCEDURE — G0008 ADMIN INFLUENZA VIRUS VAC: HCPCS | Performed by: FAMILY MEDICINE

## 2023-09-29 PROCEDURE — G0009 ADMIN PNEUMOCOCCAL VACCINE: HCPCS | Performed by: FAMILY MEDICINE

## 2023-09-29 PROCEDURE — RXMED WILLOW AMBULATORY MEDICATION CHARGE: Performed by: NURSE PRACTITIONER

## 2023-09-29 PROCEDURE — 90677 PCV20 VACCINE IM: CPT | Performed by: FAMILY MEDICINE

## 2023-09-29 PROCEDURE — 3075F SYST BP GE 130 - 139MM HG: CPT | Performed by: FAMILY MEDICINE

## 2023-09-29 RX ORDER — METHYLPREDNISOLONE 4 MG/1
TABLET ORAL
Qty: 21 TABLET | Refills: 0 | Status: SHIPPED | OUTPATIENT
Start: 2023-09-29 | End: 2023-10-12

## 2023-09-29 ASSESSMENT — ENCOUNTER SYMPTOMS
PALPITATIONS: 0
FEVER: 0
CHILLS: 0

## 2023-09-29 ASSESSMENT — FIBROSIS 4 INDEX: FIB4 SCORE: 0.62

## 2023-09-29 NOTE — PROGRESS NOTES
FAMILY MEDICINE VISIT                                                               Chief complaint::Diagnoses of Allergic reaction, initial encounter, Primary hypertension, and Need for vaccination were pertinent to this visit.    History of present illness: Nita Sepulveda is a 67 y.o. female who presented for allergic reaction, hypertension follow-up.      She reports that she sprayed liquid at her tree which got to her face and tongue and she swallowed some of it due to air following that direction.  She noticed that her tongue is black and she is worried about it.  She has been doing salt gargles and using Listerine to clear her throat.  She has some irritation in her throat.    Here for blood pressure follow-up although blood pressure today came back at 136/70.  She brought a blood pressure machine which we checked and that is giving her correct readings.  She is on losartan 25 mg daily      Review of systems:     Review of Systems   Constitutional:  Negative for chills and fever.   HENT:          Black color tongue, throat irritation   Cardiovascular:  Negative for chest pain, palpitations and leg swelling.        Medications and Allergies:     Current Outpatient Medications   Medication Sig Dispense Refill    methylPREDNISolone (MEDROL DOSEPAK) 4 MG Tablet Therapy Pack As directed on the packaging label. 21 Tablet 0    Azelastine HCl 137 MCG/SPRAY Solution ADMINISTER 1 SPRAY INTO AFFECTED NOSTRIL(S) 2 TIMES A DAY. 30 mL 1    losartan (COZAAR) 25 MG Tab Take 1 Tablet by mouth every day. 90 Tablet 3    montelukast (SINGULAIR) 10 MG Tab Take 1 Tablet by mouth every day. 90 Tablet 3    pantoprazole (PROTONIX) 20 MG tablet Take 1 Tablet by mouth every day. 90 Tablet 3    ibuprofen (MOTRIN) 200 MG Tab Take 200 mg by mouth every 6 hours as needed.      cyclobenzaprine (FLEXERIL) 5 mg tablet Take 1 Tablet by mouth 3 times a day as needed for Muscle Spasms. 30 Tablet 0    vitamin D3 (CHOLECALCIFEROL) 1000 Unit (25  "mcg) Tab Take 1,000 Units by mouth every day.      Multiple Vitamins-Minerals (MULTIVITAMIN ADULT PO) Take 1 Tablet by mouth every day.       No current facility-administered medications for this visit.          Vitals:    /70   Pulse 75   Temp 36.5 °C (97.7 °F)   Resp 16   Ht 1.575 m (5' 2\")   Wt 64 kg (141 lb 1.5 oz)   SpO2 96%  Body mass index is 25.81 kg/m².    Physical Exam:     Physical Exam  Constitutional:       Appearance: Normal appearance. She is well-developed and well-groomed.   HENT:      Head: Normocephalic and atraumatic.      Right Ear: External ear normal.      Left Ear: External ear normal.      Mouth/Throat:      Comments: Mild blackening of tongue, no erythema at throat  Eyes:      General:         Right eye: No discharge.         Left eye: No discharge.      Conjunctiva/sclera: Conjunctivae normal.   Cardiovascular:      Rate and Rhythm: Normal rate.   Pulmonary:      Effort: Pulmonary effort is normal. No respiratory distress.   Musculoskeletal:      Cervical back: Neck supple.   Skin:     Findings: No rash.   Neurological:      Mental Status: She is alert.   Psychiatric:         Mood and Affect: Mood and affect normal.         Behavior: Behavior normal.            Assessment/Plan:         1. Allergic reaction, initial encounter  New problem, unstable, likely had allergic reaction to that liquid, start Medrol Dosepak.    - methylPREDNISolone (MEDROL DOSEPAK) 4 MG Tablet Therapy Pack; As directed on the packaging label.  Dispense: 21 Tablet; Refill: 0    2. Primary hypertension  Chronic problem, stable, continue losartan 20 mg daily.  Monitor blood pressure    3. Need for vaccination  - INFLUENZA VACCINE, HIGH DOSE (65+ ONLY)  - Pneumococcal Conjugate Vaccine 20-Valent (19 yrs+)     Please note that this dictation was created using voice recognition software. I have made every reasonable attempt to correct obvious errors, but I expect that there are errors of grammar and possibly " content that I did not discover before finalizing the note.    Follow up in December 23 for lab follow-up

## 2023-10-02 ENCOUNTER — PHARMACY VISIT (OUTPATIENT)
Dept: PHARMACY | Facility: MEDICAL CENTER | Age: 68
End: 2023-10-02
Payer: COMMERCIAL

## 2023-10-12 ENCOUNTER — OFFICE VISIT (OUTPATIENT)
Dept: MEDICAL GROUP | Facility: MEDICAL CENTER | Age: 68
End: 2023-10-12
Payer: MEDICARE

## 2023-10-12 VITALS
TEMPERATURE: 98.1 F | SYSTOLIC BLOOD PRESSURE: 130 MMHG | WEIGHT: 142 LBS | BODY MASS INDEX: 26.13 KG/M2 | DIASTOLIC BLOOD PRESSURE: 72 MMHG | HEIGHT: 62 IN | OXYGEN SATURATION: 96 % | HEART RATE: 71 BPM | RESPIRATION RATE: 16 BRPM

## 2023-10-12 DIAGNOSIS — L65.9 HAIR LOSS: ICD-10-CM

## 2023-10-12 DIAGNOSIS — R76.8 ANTI-TPO ANTIBODIES PRESENT: ICD-10-CM

## 2023-10-12 DIAGNOSIS — N18.2 CKD (CHRONIC KIDNEY DISEASE) STAGE 2, GFR 60-89 ML/MIN: ICD-10-CM

## 2023-10-12 PROCEDURE — 3075F SYST BP GE 130 - 139MM HG: CPT | Performed by: FAMILY MEDICINE

## 2023-10-12 PROCEDURE — 3078F DIAST BP <80 MM HG: CPT | Performed by: FAMILY MEDICINE

## 2023-10-12 PROCEDURE — 99214 OFFICE O/P EST MOD 30 MIN: CPT | Performed by: FAMILY MEDICINE

## 2023-10-12 ASSESSMENT — ENCOUNTER SYMPTOMS
CHILLS: 0
FEVER: 0
PALPITATIONS: 0

## 2023-10-12 ASSESSMENT — FIBROSIS 4 INDEX: FIB4 SCORE: 0.62

## 2023-10-12 NOTE — PROGRESS NOTES
FAMILY MEDICINE VISIT                                                               Chief complaint::Diagnoses of Hair loss, Anti-TPO antibodies present, and CKD (chronic kidney disease) stage 2, GFR 60-89 ml/min were pertinent to this visit.    History of present illness: Nita Sepulveda is a 67 y.o. female who presented for hearing loss.      She has history of anti-TPO antibodies positive.  Her last thyroid function test was normal.  Last ultrasound also overall came back normal.  She reports that she is losing a lot of hair and her hairdresser also noticed that she is losing a lot of hair.    She is currently not on any medication for thyroid.  She has been taking vitamin B12 supplementation, sometimes take vitamin D supplementation.  Has history of CKD stage II, last filtration rate came back at 84%.      Review of systems:     Review of Systems   Constitutional:  Negative for chills, fever and malaise/fatigue.   Cardiovascular:  Negative for chest pain, palpitations and leg swelling.   Endo/Heme/Allergies:         Hair loss        Medications and Allergies:     Current Outpatient Medications   Medication Sig Dispense Refill    Azelastine HCl 137 MCG/SPRAY Solution ADMINISTER 1 SPRAY INTO AFFECTED NOSTRIL(S) 2 TIMES A DAY. 30 mL 1    losartan (COZAAR) 25 MG Tab Take 1 Tablet by mouth every day. 90 Tablet 3    montelukast (SINGULAIR) 10 MG Tab Take 1 Tablet by mouth every day. 90 Tablet 3    pantoprazole (PROTONIX) 20 MG tablet Take 1 Tablet by mouth every day. 90 Tablet 3    ibuprofen (MOTRIN) 200 MG Tab Take 200 mg by mouth every 6 hours as needed.      cyclobenzaprine (FLEXERIL) 5 mg tablet Take 1 Tablet by mouth 3 times a day as needed for Muscle Spasms. 30 Tablet 0    vitamin D3 (CHOLECALCIFEROL) 1000 Unit (25 mcg) Tab Take 1,000 Units by mouth every day.      Multiple Vitamins-Minerals (MULTIVITAMIN ADULT PO) Take 1 Tablet by mouth every day.       No current facility-administered medications for this  "visit.          Vitals:    /72   Pulse 71   Temp 36.7 °C (98.1 °F) (Temporal)   Resp 16   Ht 1.575 m (5' 2\")   Wt 64.4 kg (142 lb)   SpO2 96%  Body mass index is 25.97 kg/m².    Physical Exam:     Physical Exam  Constitutional:       Appearance: Normal appearance. She is well-developed and well-groomed.   HENT:      Head: Normocephalic and atraumatic.      Right Ear: External ear normal.      Left Ear: External ear normal.   Eyes:      General:         Right eye: No discharge.         Left eye: No discharge.      Conjunctiva/sclera: Conjunctivae normal.   Cardiovascular:      Rate and Rhythm: Normal rate.   Pulmonary:      Effort: Pulmonary effort is normal. No respiratory distress.   Musculoskeletal:      Cervical back: Neck supple.   Skin:     Findings: No rash.   Neurological:      Mental Status: She is alert.   Psychiatric:         Mood and Affect: Mood and affect normal.         Behavior: Behavior normal.              Assessment/Plan:       1. Hair loss  New problem, unstable, will check CBC, vitamin D B12 level, TPO antibodies.  Labs previously ordered include thyroid function test.  Recommended to do hair oiling the day before or 2 hours before showering which will help in direct nutrition to hair.    - CBC WITH DIFFERENTIAL; Future  - VITAMIN D,25 HYDROXY (DEFICIENCY); Future  - VITAMIN B12; Future    2. Anti-TPO antibodies present  Chronic problem, unstable, recheck TPO antibodies and thyroid function as she is having hair loss.  - THYROID PEROXIDASE  (TPO) AB; Future    3. CKD (chronic kidney disease) stage 2, GFR 60-89 ml/min  Chronic problem, stable, continue to drink water.  Recheck labs ordered.    - VITAMIN D,25 HYDROXY (DEFICIENCY); Future     Please note that this dictation was created using voice recognition software. I have made every reasonable attempt to correct obvious errors, but I expect that there are errors of grammar and possibly content that I did not discover before " finalizing the note.    Follow up in 2 months for lab follow-up.

## 2023-10-18 ENCOUNTER — HOSPITAL ENCOUNTER (OUTPATIENT)
Dept: LAB | Facility: MEDICAL CENTER | Age: 68
End: 2023-10-18
Attending: FAMILY MEDICINE
Payer: MEDICARE

## 2023-10-18 DIAGNOSIS — L65.9 HAIR LOSS: ICD-10-CM

## 2023-10-18 DIAGNOSIS — R73.01 IMPAIRED FASTING BLOOD SUGAR: ICD-10-CM

## 2023-10-18 DIAGNOSIS — R09.89 THROAT FULLNESS: ICD-10-CM

## 2023-10-18 DIAGNOSIS — N18.2 CKD (CHRONIC KIDNEY DISEASE) STAGE 2, GFR 60-89 ML/MIN: ICD-10-CM

## 2023-10-18 DIAGNOSIS — R76.8 ANTI-TPO ANTIBODIES PRESENT: ICD-10-CM

## 2023-10-18 DIAGNOSIS — E78.2 MIXED HYPERLIPIDEMIA: ICD-10-CM

## 2023-10-18 LAB
25(OH)D3 SERPL-MCNC: 26 NG/ML (ref 30–100)
ALBUMIN SERPL BCP-MCNC: 3.9 G/DL (ref 3.2–4.9)
ALBUMIN/GLOB SERPL: 1.4 G/DL
ALP SERPL-CCNC: 94 U/L (ref 30–99)
ALT SERPL-CCNC: 11 U/L (ref 2–50)
ANION GAP SERPL CALC-SCNC: 10 MMOL/L (ref 7–16)
AST SERPL-CCNC: 13 U/L (ref 12–45)
BASOPHILS # BLD AUTO: 1 % (ref 0–1.8)
BASOPHILS # BLD: 0.07 K/UL (ref 0–0.12)
BILIRUB SERPL-MCNC: 0.4 MG/DL (ref 0.1–1.5)
BUN SERPL-MCNC: 12 MG/DL (ref 8–22)
CALCIUM ALBUM COR SERPL-MCNC: 8.8 MG/DL (ref 8.5–10.5)
CALCIUM SERPL-MCNC: 8.7 MG/DL (ref 8.4–10.2)
CHLORIDE SERPL-SCNC: 104 MMOL/L (ref 96–112)
CHOLEST SERPL-MCNC: 224 MG/DL (ref 100–199)
CO2 SERPL-SCNC: 25 MMOL/L (ref 20–33)
CREAT SERPL-MCNC: 0.91 MG/DL (ref 0.5–1.4)
EOSINOPHIL # BLD AUTO: 0.3 K/UL (ref 0–0.51)
EOSINOPHIL NFR BLD: 4.4 % (ref 0–6.9)
ERYTHROCYTE [DISTWIDTH] IN BLOOD BY AUTOMATED COUNT: 43.4 FL (ref 35.9–50)
EST. AVERAGE GLUCOSE BLD GHB EST-MCNC: 111 MG/DL
FASTING STATUS PATIENT QL REPORTED: NORMAL
GFR SERPLBLD CREATININE-BSD FMLA CKD-EPI: 69 ML/MIN/1.73 M 2
GLOBULIN SER CALC-MCNC: 2.8 G/DL (ref 1.9–3.5)
GLUCOSE SERPL-MCNC: 91 MG/DL (ref 65–99)
HBA1C MFR BLD: 5.5 % (ref 4–5.6)
HCT VFR BLD AUTO: 40.2 % (ref 37–47)
HDLC SERPL-MCNC: 56 MG/DL
HGB BLD-MCNC: 13.5 G/DL (ref 12–16)
IMM GRANULOCYTES # BLD AUTO: 0.02 K/UL (ref 0–0.11)
IMM GRANULOCYTES NFR BLD AUTO: 0.3 % (ref 0–0.9)
LDLC SERPL CALC-MCNC: 133 MG/DL
LYMPHOCYTES # BLD AUTO: 1.77 K/UL (ref 1–4.8)
LYMPHOCYTES NFR BLD: 25.9 % (ref 22–41)
MCH RBC QN AUTO: 29.7 PG (ref 27–33)
MCHC RBC AUTO-ENTMCNC: 33.6 G/DL (ref 32.2–35.5)
MCV RBC AUTO: 88.5 FL (ref 81.4–97.8)
MONOCYTES # BLD AUTO: 0.4 K/UL (ref 0–0.85)
MONOCYTES NFR BLD AUTO: 5.8 % (ref 0–13.4)
NEUTROPHILS # BLD AUTO: 4.28 K/UL (ref 1.82–7.42)
NEUTROPHILS NFR BLD: 62.6 % (ref 44–72)
NRBC # BLD AUTO: 0 K/UL
NRBC BLD-RTO: 0 /100 WBC (ref 0–0.2)
PLATELET # BLD AUTO: 316 K/UL (ref 164–446)
PMV BLD AUTO: 10.4 FL (ref 9–12.9)
POTASSIUM SERPL-SCNC: 4.1 MMOL/L (ref 3.6–5.5)
PROT SERPL-MCNC: 6.7 G/DL (ref 6–8.2)
RBC # BLD AUTO: 4.54 M/UL (ref 4.2–5.4)
SODIUM SERPL-SCNC: 139 MMOL/L (ref 135–145)
T4 FREE SERPL-MCNC: 0.95 NG/DL (ref 0.93–1.7)
TRIGL SERPL-MCNC: 175 MG/DL (ref 0–149)
TSH SERPL DL<=0.005 MIU/L-ACNC: 2.62 UIU/ML (ref 0.38–5.33)
WBC # BLD AUTO: 6.8 K/UL (ref 4.8–10.8)

## 2023-10-18 PROCEDURE — 82607 VITAMIN B-12: CPT

## 2023-10-18 PROCEDURE — 80053 COMPREHEN METABOLIC PANEL: CPT

## 2023-10-18 PROCEDURE — 84443 ASSAY THYROID STIM HORMONE: CPT

## 2023-10-18 PROCEDURE — 86376 MICROSOMAL ANTIBODY EACH: CPT

## 2023-10-18 PROCEDURE — 82306 VITAMIN D 25 HYDROXY: CPT

## 2023-10-18 PROCEDURE — 80061 LIPID PANEL: CPT

## 2023-10-18 PROCEDURE — 83036 HEMOGLOBIN GLYCOSYLATED A1C: CPT

## 2023-10-18 PROCEDURE — 85025 COMPLETE CBC W/AUTO DIFF WBC: CPT

## 2023-10-18 PROCEDURE — 36415 COLL VENOUS BLD VENIPUNCTURE: CPT

## 2023-10-18 PROCEDURE — 84439 ASSAY OF FREE THYROXINE: CPT

## 2023-10-19 LAB
THYROPEROXIDASE AB SERPL-ACNC: 11 IU/ML (ref 0–9)
VIT B12 SERPL-MCNC: 1297 PG/ML (ref 211–911)

## 2023-11-22 ENCOUNTER — APPOINTMENT (OUTPATIENT)
Dept: MEDICAL GROUP | Facility: MEDICAL CENTER | Age: 68
End: 2023-11-22
Payer: MEDICARE

## 2023-11-28 ENCOUNTER — OFFICE VISIT (OUTPATIENT)
Dept: MEDICAL GROUP | Facility: MEDICAL CENTER | Age: 68
End: 2023-11-28
Payer: MEDICARE

## 2023-11-28 VITALS
HEIGHT: 62 IN | RESPIRATION RATE: 17 BRPM | HEART RATE: 62 BPM | OXYGEN SATURATION: 96 % | WEIGHT: 142 LBS | BODY MASS INDEX: 26.13 KG/M2 | SYSTOLIC BLOOD PRESSURE: 122 MMHG | TEMPERATURE: 97.7 F | DIASTOLIC BLOOD PRESSURE: 62 MMHG

## 2023-11-28 DIAGNOSIS — E78.2 MIXED HYPERLIPIDEMIA: ICD-10-CM

## 2023-11-28 DIAGNOSIS — I10 PRIMARY HYPERTENSION: ICD-10-CM

## 2023-11-28 DIAGNOSIS — E55.9 VITAMIN D DEFICIENCY: ICD-10-CM

## 2023-11-28 DIAGNOSIS — J01.40 ACUTE NON-RECURRENT PANSINUSITIS: ICD-10-CM

## 2023-11-28 PROCEDURE — 99214 OFFICE O/P EST MOD 30 MIN: CPT | Performed by: FAMILY MEDICINE

## 2023-11-28 PROCEDURE — 3078F DIAST BP <80 MM HG: CPT | Performed by: FAMILY MEDICINE

## 2023-11-28 PROCEDURE — 3074F SYST BP LT 130 MM HG: CPT | Performed by: FAMILY MEDICINE

## 2023-11-28 RX ORDER — DOXYCYCLINE HYCLATE 100 MG
100 TABLET ORAL 2 TIMES DAILY
Qty: 14 TABLET | Refills: 0 | Status: SHIPPED | OUTPATIENT
Start: 2023-11-28 | End: 2023-12-05

## 2023-11-28 RX ORDER — METHYLPREDNISOLONE 4 MG/1
TABLET ORAL
Qty: 21 TABLET | Refills: 0 | Status: SHIPPED | OUTPATIENT
Start: 2023-11-28 | End: 2023-12-14

## 2023-11-28 ASSESSMENT — FIBROSIS 4 INDEX: FIB4 SCORE: 0.84

## 2023-11-28 NOTE — ASSESSMENT & PLAN NOTE
New problem, unstable, start doxycycline and Medrol Dosepak.  Recommend patient to use Mary pot.  If symptoms are not getting better and she is getting recurrent sinus infections, will send her to ENT

## 2023-11-28 NOTE — PROGRESS NOTES
Virtual Visit: Established Patient   This visit was conducted via Zoom using secure and encrypted videoconferencing technology.   The patient was in their home in the [facility].    The patient's identity was confirmed and verbal consent was obtained for this virtual visit.     Subjective:   CC: sinus pain, congestion      Nita Sepulveda is a 68 y.o. female presenting for sinus congestion, pain, lab follow-up    Problem   Acute Non-Recurrent Pansinusitis    She is having sinus pain, congestion, nasal congestion since last 1 week.  She takes singular.  Not able to use azelastine nasal spray.  She gets frequent sinus infections.  Does not have any fever, no GI symptoms or other symptoms.     Vitamin D Deficiency        Vitamin D level came back at 26.  She is taking vitamin D supplementation    Component      Latest Ref Rng 10/18/2023   25-Hydroxy Vitamin D 25      30 - 100 ng/mL 26 (L)       Legend:  (L) Low     Mixed Hyperlipidemia    Recent cholesterol panel came back elevated    The 10-year ASCVD risk score (Trever JONES, et al., 2019) is: 9.7%    Lab Results   Component Value Date/Time    CHOLSTRLTOT 224 (H) 10/18/2023 0954    TRIGLYCERIDE 175 (H) 10/18/2023 0954    HDL 56 10/18/2023 0954     (H) 10/18/2023 0954         Primary Hypertension    Blood pressure today came back at 122/62.  She had eye exam done which showed hypertensive retinopathy.  She is taking losartan 25 mg daily which is helping with controlling the symptoms.  No side effects with this medication          ROS   Positive for sinus congestion, pain    Current medicines (including changes today)  Current Outpatient Medications   Medication Sig Dispense Refill    doxycycline (VIBRAMYCIN) 100 MG Tab Take 1 Tablet by mouth 2 times a day for 7 days. 14 Tablet 0    methylPREDNISolone (MEDROL DOSEPAK) 4 MG Tablet Therapy Pack As directed on the packaging label. 21 Tablet 0    losartan (COZAAR) 25 MG Tab Take 1 Tablet by mouth every day. 90  "Tablet 3    montelukast (SINGULAIR) 10 MG Tab Take 1 Tablet by mouth every day. 90 Tablet 3    pantoprazole (PROTONIX) 20 MG tablet Take 1 Tablet by mouth every day. 90 Tablet 3    ibuprofen (MOTRIN) 200 MG Tab Take 200 mg by mouth every 6 hours as needed.      cyclobenzaprine (FLEXERIL) 5 mg tablet Take 1 Tablet by mouth 3 times a day as needed for Muscle Spasms. 30 Tablet 0    vitamin D3 (CHOLECALCIFEROL) 1000 Unit (25 mcg) Tab Take 1,000 Units by mouth every day.      Multiple Vitamins-Minerals (MULTIVITAMIN ADULT PO) Take 1 Tablet by mouth every day.       No current facility-administered medications for this visit.       Patient Active Problem List    Diagnosis Date Noted    Acute non-recurrent pansinusitis 11/28/2023    Vitamin D deficiency 11/28/2023    Hypertensive retinopathy of both eyes 06/13/2023    Postnasal drip 04/04/2023    Anti-TPO antibodies present 04/04/2023    Mixed hyperlipidemia 04/04/2023    Impaired fasting blood sugar 04/04/2023    Throat fullness 04/04/2023    Primary hypertension 04/04/2023    Other hemorrhoids 03/01/2022    Chronic pain of left knee 03/16/2021    Chronic gastric ulcer without hemorrhage and without perforation 01/20/2021    Recurrent UTI 08/30/2011        Objective:   /62   Pulse 62   Temp 36.5 °C (97.7 °F)   Resp 17   Ht 1.575 m (5' 2\")   Wt 64.4 kg (142 lb)   SpO2 96%   BMI 25.97 kg/m²     Physical Exam:  Constitutional: Alert, no distress, well-groomed.  Skin: No rashes in visible areas.  Eye: Round. Conjunctiva clear, lids normal. No icterus.   ENMT: Lips pink without lesions, good dentition, moist mucous membranes. Phonation normal.  Neck: No masses, no thyromegaly. Moves freely without pain.  Respiratory: Unlabored respiratory effort, no cough or audible wheeze  Psych: Alert, normal affect and mood.     Assessment and Plan:   The following treatment plan was discussed:     Problem List Items Addressed This Visit       Mixed hyperlipidemia     Chronic " problem, unstable, she would like to work on diet and exercise for next week months.  We will recheck her ASCVD risk in 6 months and if elevated will start medication         Relevant Orders    Lipid Profile    Primary hypertension     Chronic problem, stable, continue losartan 25 mg daily.         Acute non-recurrent pansinusitis     New problem, unstable, start doxycycline and Medrol Dosepak.  Recommend patient to use Mary pot.  If symptoms are not getting better and she is getting recurrent sinus infections, will send her to ENT         Relevant Medications    doxycycline (VIBRAMYCIN) 100 MG Tab    methylPREDNISolone (MEDROL DOSEPAK) 4 MG Tablet Therapy Pack    Vitamin D deficiency     Chronic problem, unstable, continue taking vitamin D supplementation         Relevant Orders    VITAMIN D,25 HYDROXY (DEFICIENCY)          Follow-up: Return in about 6 months (around 5/28/2024).

## 2023-11-28 NOTE — ASSESSMENT & PLAN NOTE
Chronic problem, unstable, she would like to work on diet and exercise for next week months.  We will recheck her ASCVD risk in 6 months and if elevated will start medication

## 2023-12-14 ENCOUNTER — OFFICE VISIT (OUTPATIENT)
Dept: MEDICAL GROUP | Facility: MEDICAL CENTER | Age: 68
End: 2023-12-14
Payer: MEDICARE

## 2023-12-14 VITALS
TEMPERATURE: 97.9 F | SYSTOLIC BLOOD PRESSURE: 112 MMHG | WEIGHT: 142 LBS | DIASTOLIC BLOOD PRESSURE: 62 MMHG | HEART RATE: 74 BPM | BODY MASS INDEX: 26.13 KG/M2 | RESPIRATION RATE: 17 BRPM | OXYGEN SATURATION: 97 % | HEIGHT: 62 IN

## 2023-12-14 DIAGNOSIS — E06.3 HASHIMOTO'S THYROIDITIS: ICD-10-CM

## 2023-12-14 DIAGNOSIS — K25.7 CHRONIC GASTRIC ULCER WITHOUT HEMORRHAGE AND WITHOUT PERFORATION: ICD-10-CM

## 2023-12-14 DIAGNOSIS — J32.4 CHRONIC PANSINUSITIS: ICD-10-CM

## 2023-12-14 PROCEDURE — 99214 OFFICE O/P EST MOD 30 MIN: CPT | Performed by: FAMILY MEDICINE

## 2023-12-14 PROCEDURE — 3078F DIAST BP <80 MM HG: CPT | Performed by: FAMILY MEDICINE

## 2023-12-14 PROCEDURE — 3074F SYST BP LT 130 MM HG: CPT | Performed by: FAMILY MEDICINE

## 2023-12-14 RX ORDER — PANTOPRAZOLE SODIUM 20 MG/1
20 TABLET, DELAYED RELEASE ORAL DAILY
Qty: 90 TABLET | Refills: 3 | Status: SHIPPED | OUTPATIENT
Start: 2023-12-14

## 2023-12-14 ASSESSMENT — ENCOUNTER SYMPTOMS
FEVER: 0
CHILLS: 0
SINUS PAIN: 1
PALPITATIONS: 0

## 2023-12-14 ASSESSMENT — FIBROSIS 4 INDEX: FIB4 SCORE: 0.84

## 2023-12-14 NOTE — ASSESSMENT & PLAN NOTE
Chronic problem, unstable, start budesonide nasal spray.  Recommended to continue taking Singulair and add allergy medication.  Referral to ENT for further evaluation

## 2023-12-14 NOTE — PROGRESS NOTES
FAMILY MEDICINE VISIT                                                               Chief complaint::Diagnoses of Chronic pansinusitis, Hashimoto's thyroiditis, and Chronic gastric ulcer without hemorrhage and without perforation were pertinent to this visit.    History of present illness: Nita Sepulveda is a 68 y.o. female who presented for sinus problems, referral    Problem   Chronic Pansinusitis    She is having sinus pain, congestion, nasal congestion.  She takes singular.  Not able to use azelastine nasal spray.  She gets frequent sinus infections.  Does not have any fever, no GI symptoms or other symptoms.  We treated her with steroids and antibiotics which did not help with symptoms     Hashimoto's Thyroiditis    She recently had thyroid test done for throat fullness.  TPO antibodies came back at 11 which improved from 23.  Thyroid function test came back normal.  She has family history of Hashimoto's thyroiditis in her son.    We checked ultrasound thyroid which showed below findings 4/15/2023  1.  No significant thyroid nodule or mass identified.     2.  Echotexture is homogeneous with no thyroid enlargement.     Recheck thyroid ultrasound in 1 year.    She requested referral to endocrinology     Chronic Gastric Ulcer Without Hemorrhage and Without Perforation    On Protonix 20 mg daily.  Denies any symptoms currently.  Requested refill for this medication          Review of systems:     Review of Systems   Constitutional:  Negative for chills and fever.   HENT:  Positive for congestion and sinus pain.    Cardiovascular:  Negative for chest pain, palpitations and leg swelling.        Medications and Allergies:     Current Outpatient Medications   Medication Sig Dispense Refill    budesonide (RINOCORT AQUA) 32 MCG/ACT nasal spray Administer 2 Sprays into affected nostril(S) every day. 8.43 mL 3    pantoprazole (PROTONIX) 20 MG tablet Take 1 Tablet by mouth every day. 90 Tablet 3    losartan (COZAAR) 25 MG  "Tab Take 1 Tablet by mouth every day. 90 Tablet 3    montelukast (SINGULAIR) 10 MG Tab Take 1 Tablet by mouth every day. 90 Tablet 3    ibuprofen (MOTRIN) 200 MG Tab Take 200 mg by mouth every 6 hours as needed.      cyclobenzaprine (FLEXERIL) 5 mg tablet Take 1 Tablet by mouth 3 times a day as needed for Muscle Spasms. 30 Tablet 0    vitamin D3 (CHOLECALCIFEROL) 1000 Unit (25 mcg) Tab Take 1,000 Units by mouth every day.      Multiple Vitamins-Minerals (MULTIVITAMIN ADULT PO) Take 1 Tablet by mouth every day.       No current facility-administered medications for this visit.          Vitals:    /62   Pulse 74   Temp 36.6 °C (97.9 °F)   Resp 17   Ht 1.575 m (5' 2\")   Wt 64.4 kg (142 lb)   SpO2 97%  Body mass index is 25.97 kg/m².    Physical Exam:     Physical Exam  Constitutional:       Appearance: Normal appearance. She is well-developed and well-groomed.   HENT:      Head: Normocephalic and atraumatic.      Right Ear: External ear normal.      Left Ear: External ear normal.   Eyes:      General:         Right eye: No discharge.         Left eye: No discharge.      Conjunctiva/sclera: Conjunctivae normal.   Cardiovascular:      Rate and Rhythm: Normal rate.   Pulmonary:      Effort: Pulmonary effort is normal. No respiratory distress.   Musculoskeletal:      Cervical back: Neck supple.   Skin:     Findings: No rash.   Neurological:      Mental Status: She is alert.   Psychiatric:         Mood and Affect: Mood and affect normal.         Behavior: Behavior normal.              Assessment/Plan:      Problem List Items Addressed This Visit       Chronic gastric ulcer without hemorrhage and without perforation    Relevant Medications    pantoprazole (PROTONIX) 20 MG tablet    Hashimoto's thyroiditis    Relevant Orders    Referral to Endocrinology    Chronic pansinusitis    Relevant Medications    budesonide (RINOCORT AQUA) 32 MCG/ACT nasal spray    Other Relevant Orders    Referral to ENT        Please note " that this dictation was created using voice recognition software. I have made every reasonable attempt to correct obvious errors, but I expect that there are errors of grammar and possibly content that I did not discover before finalizing the note.    Follow up in May 24 for follow-up

## 2024-01-31 ENCOUNTER — OFFICE VISIT (OUTPATIENT)
Dept: ENDOCRINOLOGY | Facility: MEDICAL CENTER | Age: 69
End: 2024-01-31
Attending: INTERNAL MEDICINE
Payer: MEDICARE

## 2024-01-31 VITALS
HEART RATE: 73 BPM | DIASTOLIC BLOOD PRESSURE: 78 MMHG | HEIGHT: 63 IN | SYSTOLIC BLOOD PRESSURE: 146 MMHG | BODY MASS INDEX: 25.5 KG/M2 | WEIGHT: 143.9 LBS | OXYGEN SATURATION: 95 %

## 2024-01-31 DIAGNOSIS — E04.1 THYROID NODULE: ICD-10-CM

## 2024-01-31 DIAGNOSIS — E06.3 HASHIMOTO'S DISEASE: ICD-10-CM

## 2024-01-31 DIAGNOSIS — E78.2 MIXED HYPERLIPIDEMIA: ICD-10-CM

## 2024-01-31 DIAGNOSIS — E55.9 VITAMIN D DEFICIENCY: ICD-10-CM

## 2024-01-31 PROCEDURE — 99204 OFFICE O/P NEW MOD 45 MIN: CPT | Performed by: INTERNAL MEDICINE

## 2024-01-31 PROCEDURE — 3078F DIAST BP <80 MM HG: CPT | Performed by: INTERNAL MEDICINE

## 2024-01-31 PROCEDURE — 3077F SYST BP >= 140 MM HG: CPT | Performed by: INTERNAL MEDICINE

## 2024-01-31 PROCEDURE — 99212 OFFICE O/P EST SF 10 MIN: CPT | Performed by: INTERNAL MEDICINE

## 2024-01-31 RX ORDER — AZELASTINE 1 MG/ML
SPRAY, METERED NASAL
COMMUNITY
Start: 2024-01-22

## 2024-01-31 RX ORDER — FLUTICASONE PROPIONATE 50 MCG
SPRAY, SUSPENSION (ML) NASAL
COMMUNITY
Start: 2024-01-22

## 2024-01-31 ASSESSMENT — FIBROSIS 4 INDEX: FIB4 SCORE: 0.84

## 2024-01-31 NOTE — PROGRESS NOTES
Chief Complaint: Consult requested by Gael Patel M.D. for evaluation of Hashimoto's thyroiditis with normal TSH levels and thyroid nodule    HPI:     Nita Sepulveda is a 68 y.o. female with Hashimoto's thyroiditis first discovered on March 31, 2023 her baseline TPO antibodies were 23 and thyroglobulin antibodies were less than 0.9.  Baseline TSH levels have been as high as 4.0 with normal free T4 levels of 0.96 last TSH was 2.6 with a free T40.95 On October 18, 2023.    She has a family history of Hashimoto's thyroiditis with her aunt.  She has a history of heart disease with her father.    At this time she denies symptoms of hypothyroidism such as fatigue, weight gain, cold intolerance and constipation and joint pains.  She does report hair loss that has improved after starting some supplements.  She is also taking vitamin D for vitamin D deficiency but has not had a workup for celiac disease      She also had a formal thyroid ultrasound with radiology on April 15, 2023 showing a heterogenous and hypervascular thyroid gland compatible with Hashimoto's thyroiditis and she has a 7 mm solid nodule on the left upper lobe.  Interestingly the final report by radiology did not include this particular solid nodule    She denies a family history of thyroid cancer, she denies radiation exposure to the head and neck as a child.  She denies anterior neck compressive symptoms        Patient's medications, allergies, and social histories were reviewed and updated as appropriate.      ROS:     CONS:     No fever, no chills, no weight loss, no fatigue   EYES:      No diplopia, no blurry vision, no redness of eyes, no swelling of eyelids   ENT:    No hearing loss, No ear pain, No sore throat, no dysphagia, no neck swelling   CV:     No chest pain, no palpitations, no claudication, no orthopnea, no PND   PULM:    No SOB, no cough, no hemoptysis, no wheezing    GI:   No nausea, no vomiting, no diarrhea, no constipation, no  bloody stools   :  Passing urine well, no dysuria, no hematuria   ENDO:   No polyuria, no polydipsia, no heat intolerance, no cold intolerance   NEURO: No headaches, no dizziness, no convulsions, no tremors   MUSC:  No joint swellings, no arthralgias, no myalgias, no weakness   SKIN:   No rash, no ulcers, no dry skin, reports history of hair loss   PSYCH:   No depression, no anxiety, no difficulty sleeping       Past Medical History:  Patient Active Problem List    Diagnosis Date Noted    Thyroid nodule 01/31/2024    Chronic pansinusitis 11/28/2023    Vitamin D deficiency 11/28/2023    Hypertensive retinopathy of both eyes 06/13/2023    Postnasal drip 04/04/2023    Hashimoto's disease 04/04/2023    Mixed hyperlipidemia 04/04/2023    Impaired fasting blood sugar 04/04/2023    Throat fullness 04/04/2023    Primary hypertension 04/04/2023    Other hemorrhoids 03/01/2022    Chronic pain of left knee 03/16/2021    Chronic gastric ulcer without hemorrhage and without perforation 01/20/2021    Recurrent UTI 08/30/2011       Past Surgical History:  Past Surgical History:   Procedure Laterality Date    TUBAL COAGULATION LAPAROSCOPIC BILATERAL      TUBAL LIGATION          Allergies:  Acyclovir and Hydrocodone-acetaminophen     Current Medications:    Current Outpatient Medications:     azelastine (ASTELIN) 137 MCG/SPRAY nasal spray, 2 sprays in each nostril Nasally Twice a day for 90 day(s), Disp: , Rfl:     fluticasone (FLONASE) 50 MCG/ACT nasal spray, 1 spray in each nostril Nasally Once a day for 90 day(s), Disp: , Rfl:     pantoprazole (PROTONIX) 20 MG tablet, Take 1 Tablet by mouth every day., Disp: 90 Tablet, Rfl: 3    losartan (COZAAR) 25 MG Tab, Take 1 Tablet by mouth every day., Disp: 90 Tablet, Rfl: 3    montelukast (SINGULAIR) 10 MG Tab, Take 1 Tablet by mouth every day., Disp: 90 Tablet, Rfl: 3    ibuprofen (MOTRIN) 200 MG Tab, Take 200 mg by mouth every 6 hours as needed., Disp: , Rfl:     cyclobenzaprine  (FLEXERIL) 5 mg tablet, Take 1 Tablet by mouth 3 times a day as needed for Muscle Spasms., Disp: 30 Tablet, Rfl: 0    vitamin D3 (CHOLECALCIFEROL) 1000 Unit (25 mcg) Tab, Take 1,000 Units by mouth every day., Disp: , Rfl:     Multiple Vitamins-Minerals (MULTIVITAMIN ADULT PO), Take 1 Tablet by mouth every day., Disp: , Rfl:     Social History:  Social History     Socioeconomic History    Marital status:      Spouse name: Not on file    Number of children: Not on file    Years of education: Not on file    Highest education level: Associate degree: occupational, technical, or vocational program   Occupational History    Not on file   Tobacco Use    Smoking status: Former    Smokeless tobacco: Never    Tobacco comments:     Never   Vaping Use    Vaping Use: Never used   Substance and Sexual Activity    Alcohol use: Not Currently     Comment: occ    Drug use: Never    Sexual activity: Not Currently     Partners: Male     Birth control/protection: Female Sterilization     Comment: worked for [employer]   Other Topics Concern    Not on file   Social History Narrative    Not on file     Social Determinants of Health     Financial Resource Strain: Low Risk  (4/1/2023)    Overall Financial Resource Strain (CARDIA)     Difficulty of Paying Living Expenses: Not very hard   Food Insecurity: No Food Insecurity (4/1/2023)    Hunger Vital Sign     Worried About Running Out of Food in the Last Year: Never true     Ran Out of Food in the Last Year: Never true   Transportation Needs: No Transportation Needs (4/1/2023)    PRAPARE - Transportation     Lack of Transportation (Medical): No     Lack of Transportation (Non-Medical): No   Physical Activity: Insufficiently Active (4/1/2023)    Exercise Vital Sign     Days of Exercise per Week: 3 days     Minutes of Exercise per Session: 30 min   Stress: Stress Concern Present (4/1/2023)    [employer] [place] [employer] - Occupational Stress Questionnaire     Feeling of  "Stress : To some extent   Social Connections: Socially Isolated (4/1/2023)    Social Connection and Isolation Panel [NHANES]     Frequency of Communication with Friends and Family: More than three times a week     Frequency of Social Gatherings with Friends and Family: More than three times a week     Attends Congregational Services: Never     Active Member of Clubs or Organizations: No     Attends Club or Organization Meetings: Never     Marital Status:    Intimate Partner Violence: Not on file   Housing Stability: Low Risk  (4/1/2023)    Housing Stability Vital Sign     Unable to Pay for Housing in the Last Year: No     Number of Places Lived in the Last Year: 1     Unstable Housing in the Last Year: No        Family History:   Family History   Problem Relation Age of Onset    Psychiatric Illness Mother     Cancer Father     Heart Disease Father     Cancer Sister         breast    Diabetes Son     Thyroid Son          PHYSICAL EXAM:   Vital signs: BP (!) 146/78 (BP Location: Right arm, Patient Position: Sitting, BP Cuff Size: Adult)   Pulse 73   Ht 1.588 m (5' 2.5\")   Wt 65.3 kg (143 lb 14.4 oz)   SpO2 95%   BMI 25.90 kg/m²   GENERAL: Well-developed, well-nourished  in no apparent distress.   EYE: No ocular and eyelid asymmetry, Anicteric sclerae,  PERRL  HENT: Hearing grossly intact, Normocephalic, atraumatic. Pink, moist mucous membranes, No exudate  NECK: Supple. Trachea midline. thyroid is normal in size without nodules or tenderness  CARDIOVASCULAR: Regular rate and rhythm. No murmurs, rubs, or gallops.   LUNGS: Clear to auscultation bilaterally   ABDOMEN: Soft, nontender with positive bowel sounds.   EXTREMITIES: No clubbing, cyanosis, or edema.   NEUROLOGICAL: Cranial nerves II-XII are grossly intact   Symmetric reflexes at the patella no proximal muscle weakness  LYMPH: No cervical, supraclavicular,  adenopathy palpated.   SKIN: No rashes, lesions. Turgor is normal.    Labs:  Lab Results   Component " "Value Date/Time    WBC 6.8 10/18/2023 09:54 AM    RBC 4.54 10/18/2023 09:54 AM    HEMOGLOBIN 13.5 10/18/2023 09:54 AM    MCV 88.5 10/18/2023 09:54 AM    MCH 29.7 10/18/2023 09:54 AM    MCHC 33.6 10/18/2023 09:54 AM    RDW 43.4 10/18/2023 09:54 AM    MPV 10.4 10/18/2023 09:54 AM       Lab Results   Component Value Date/Time    SODIUM 139 10/18/2023 09:54 AM    POTASSIUM 4.1 10/18/2023 09:54 AM    CHLORIDE 104 10/18/2023 09:54 AM    CO2 25 10/18/2023 09:54 AM    ANION 10.0 10/18/2023 09:54 AM    GLUCOSE 91 10/18/2023 09:54 AM    BUN 12 10/18/2023 09:54 AM    CREATININE 0.91 10/18/2023 09:54 AM    CALCIUM 8.7 10/18/2023 09:54 AM    ASTSGOT 13 10/18/2023 09:54 AM    ALTSGPT 11 10/18/2023 09:54 AM    TBILIRUBIN 0.4 10/18/2023 09:54 AM    ALBUMIN 3.9 10/18/2023 09:54 AM    TOTPROTEIN 6.7 10/18/2023 09:54 AM    GLOBULIN 2.8 10/18/2023 09:54 AM    AGRATIO 1.4 10/18/2023 09:54 AM       Lab Results   Component Value Date/Time    CHOLSTRLTOT 224 (H) 10/18/2023 0954    TRIGLYCERIDE 175 (H) 10/18/2023 0954    HDL 56 10/18/2023 0954     (H) 10/18/2023 0954       Lab Results   Component Value Date/Time    TSHULTRASEN 2.620 10/18/2023 0954     Lab Results   Component Value Date/Time    FREET4 0.95 10/18/2023 0954     No results found for: \"FREET3\"  No results found for: \"THYSTIMIG\"    Lab Results   Component Value Date/Time    MICROSOMALA 11.0 (H) 10/18/2023 0954         Imagin/15/2023 10:15 AM     HISTORY/REASON FOR EXAM:  Anti-TPO antibodies        TECHNIQUE/EXAM DESCRIPTION:  Ultrasound of the soft tissues of the head and neck.     COMPARISON:  None     FINDINGS:  The thyroid gland is heterogeneous.  Vascularity is normal.     The right lobe of the thyroid gland measures 1.10 cm x 4.57 cm x 1.38 cm. The contour and echogenicity are normal. No focal mass lesions are identified.  The left lobe of the thyroid gland measures 1.21 cm x 3.90 cm x 1.32 cm. The contour and echogenicity are normal. No significant focal mass " lesions are identified. Small echogenic 7 mm mass is noted in the upper pole.  The isthmus measures 0.36 cm.    ASSESSMENT/PLAN:     1. Hashimoto's disease  Patient is currently stable and euthyroid despite the diagnosis of Hashimoto's thyroiditis.  It is notable that the TPO antibody titers are trending down and are not significantly elevated  At this time because she is still biochemically euthyroid and clinically euthyroid there is no indication for thyroid hormone replacement therapy   I do recommend observation and monitoring of her thyroid function as she may develop primary hypothyroidism at some point in the future  We discussed the risk factors for hypothyroidism and how inflammation plays a role in development of Hashimoto's thyroiditis  We discussed that 10% of individuals with Hashimoto's thyroiditis have other autoimmune diseases such as autoimmune gastritis and celiac disease  I will see her again in 3 months with repeat of her TSH and other labs    2. Thyroid nodule  Stable she has a less than 1 cm hyperechoic solid nodule in the left upper lobe  It has no suspicious features such as microcalcifications  Biopsy is not indicated at this time  Recommend observation repeat ultrasound in the office in a couple of months    3. Vitamin D deficiency  Unstable  She has low vitamin D levels at baseline but she is not taking supplements  I am checking a celiac disease panel and vitamin D panel with her next labs    4. Mixed hyperlipidemia  Unstable total cholesterol is elevated LDL cholesterol is elevated triglycerides are mildly elevated.  At this time she does not want to take medications for hyperlipidemia.  I recommend a low-fat diet and regular exercise      Return in about 3 months (around 4/30/2024).         Total time spent on day of service was over 60 minutes which included obtaining a detailed history and physical exam, ordering labs, coordinating care and scheduling future follow-up    Thank you  kindly for allowing me to participate in the thyroid care plan for this patient.    Devonte Tavarez MD, [facility], [facility]  01/31/24    CC:   Gael Patel M.D.

## 2024-03-07 ENCOUNTER — APPOINTMENT (OUTPATIENT)
Dept: MEDICAL GROUP | Facility: MEDICAL CENTER | Age: 69
End: 2024-03-07
Payer: MEDICARE

## 2024-03-21 DIAGNOSIS — I10 PRIMARY HYPERTENSION: ICD-10-CM

## 2024-03-21 RX ORDER — LOSARTAN POTASSIUM 25 MG/1
25 TABLET ORAL DAILY
Qty: 90 TABLET | Refills: 3 | Status: SHIPPED | OUTPATIENT
Start: 2024-03-21

## 2024-04-04 ENCOUNTER — TELEPHONE (OUTPATIENT)
Dept: RADIOLOGY | Facility: MEDICAL CENTER | Age: 69
End: 2024-04-04
Payer: MEDICARE

## 2024-04-05 ENCOUNTER — TELEPHONE (OUTPATIENT)
Dept: HEALTH INFORMATION MANAGEMENT | Facility: OTHER | Age: 69
End: 2024-04-05

## 2024-04-08 ENCOUNTER — HOSPITAL ENCOUNTER (OUTPATIENT)
Dept: LAB | Facility: MEDICAL CENTER | Age: 69
End: 2024-04-08
Attending: FAMILY MEDICINE
Payer: MEDICARE

## 2024-04-08 ENCOUNTER — HOSPITAL ENCOUNTER (OUTPATIENT)
Dept: LAB | Facility: MEDICAL CENTER | Age: 69
End: 2024-04-08
Attending: INTERNAL MEDICINE
Payer: MEDICARE

## 2024-04-08 DIAGNOSIS — E55.9 VITAMIN D DEFICIENCY: ICD-10-CM

## 2024-04-08 DIAGNOSIS — E78.2 MIXED HYPERLIPIDEMIA: ICD-10-CM

## 2024-04-08 DIAGNOSIS — E04.1 THYROID NODULE: ICD-10-CM

## 2024-04-08 DIAGNOSIS — E06.3 HASHIMOTO'S DISEASE: ICD-10-CM

## 2024-04-08 LAB
25(OH)D3 SERPL-MCNC: 32 NG/ML (ref 30–100)
25(OH)D3 SERPL-MCNC: 32 NG/ML (ref 30–100)
ALBUMIN SERPL BCP-MCNC: 4.4 G/DL (ref 3.2–4.9)
ALBUMIN/GLOB SERPL: 1.6 G/DL
ALP SERPL-CCNC: 108 U/L (ref 30–99)
ALT SERPL-CCNC: 16 U/L (ref 2–50)
ANION GAP SERPL CALC-SCNC: 10 MMOL/L (ref 7–16)
AST SERPL-CCNC: 15 U/L (ref 12–45)
BILIRUB SERPL-MCNC: 0.3 MG/DL (ref 0.1–1.5)
BUN SERPL-MCNC: 11 MG/DL (ref 8–22)
CALCIUM ALBUM COR SERPL-MCNC: 9.1 MG/DL (ref 8.5–10.5)
CALCIUM SERPL-MCNC: 9.4 MG/DL (ref 8.5–10.5)
CHLORIDE SERPL-SCNC: 105 MMOL/L (ref 96–112)
CHOLEST SERPL-MCNC: 264 MG/DL (ref 100–199)
CO2 SERPL-SCNC: 25 MMOL/L (ref 20–33)
CREAT SERPL-MCNC: 0.71 MG/DL (ref 0.5–1.4)
FASTING STATUS PATIENT QL REPORTED: NORMAL
GFR SERPLBLD CREATININE-BSD FMLA CKD-EPI: 92 ML/MIN/1.73 M 2
GLOBULIN SER CALC-MCNC: 2.7 G/DL (ref 1.9–3.5)
GLUCOSE SERPL-MCNC: 95 MG/DL (ref 65–99)
HDLC SERPL-MCNC: 42 MG/DL
LDLC SERPL CALC-MCNC: 149 MG/DL
POTASSIUM SERPL-SCNC: 4.1 MMOL/L (ref 3.6–5.5)
PROT SERPL-MCNC: 7.1 G/DL (ref 6–8.2)
SODIUM SERPL-SCNC: 140 MMOL/L (ref 135–145)
T4 FREE SERPL-MCNC: 0.92 NG/DL (ref 0.93–1.7)
TRIGL SERPL-MCNC: 367 MG/DL (ref 0–149)
TSH SERPL DL<=0.005 MIU/L-ACNC: 3.14 UIU/ML (ref 0.38–5.33)

## 2024-04-08 PROCEDURE — 82306 VITAMIN D 25 HYDROXY: CPT

## 2024-04-08 PROCEDURE — 84439 ASSAY OF FREE THYROXINE: CPT

## 2024-04-08 PROCEDURE — 82306 VITAMIN D 25 HYDROXY: CPT | Mod: 91

## 2024-04-08 PROCEDURE — 86364 TISS TRNSGLTMNASE EA IG CLAS: CPT

## 2024-04-08 PROCEDURE — 36415 COLL VENOUS BLD VENIPUNCTURE: CPT

## 2024-04-08 PROCEDURE — 80061 LIPID PANEL: CPT

## 2024-04-08 PROCEDURE — 84443 ASSAY THYROID STIM HORMONE: CPT

## 2024-04-08 PROCEDURE — 80053 COMPREHEN METABOLIC PANEL: CPT

## 2024-04-08 PROCEDURE — 86258 DGP ANTIBODY EACH IG CLASS: CPT | Mod: 91

## 2024-04-10 ENCOUNTER — APPOINTMENT (OUTPATIENT)
Dept: MEDICAL GROUP | Facility: MEDICAL CENTER | Age: 69
End: 2024-04-10
Payer: MEDICARE

## 2024-04-10 LAB
GLIADIN IGA SER IA-ACNC: <0.72 FLU (ref 0–4.99)
TTG IGA SER IA-ACNC: <1.02 FLU (ref 0–4.99)

## 2024-04-11 LAB
GLIADIN IGG SER IA-ACNC: <0.56 FLU (ref 0–4.99)
TTG IGG SER IA-ACNC: <0.82 FLU (ref 0–4.99)

## 2024-04-14 DIAGNOSIS — R09.82 POSTNASAL DRIP: ICD-10-CM

## 2024-04-15 RX ORDER — MONTELUKAST SODIUM 10 MG/1
10 TABLET ORAL DAILY
Qty: 90 TABLET | Refills: 3 | Status: SHIPPED | OUTPATIENT
Start: 2024-04-15

## 2024-04-15 NOTE — TELEPHONE ENCOUNTER
Received request via: Pharmacy    Was the patient seen in the last year in this department? Yes    Does the patient have an active prescription (recently filled or refills available) for medication(s) requested? No    [facility]    Does the patient have [facility] and need 100 day supply (blood pressure, diabetes and cholesterol meds only)? Yes, quantity updated to 100 days

## 2024-04-22 ENCOUNTER — PROCEDURE VISIT (OUTPATIENT)
Dept: ENDOCRINOLOGY | Facility: MEDICAL CENTER | Age: 69
End: 2024-04-22
Attending: INTERNAL MEDICINE
Payer: MEDICARE

## 2024-04-22 DIAGNOSIS — E55.9 VITAMIN D DEFICIENCY: ICD-10-CM

## 2024-04-22 DIAGNOSIS — E06.3 HASHIMOTO'S DISEASE: ICD-10-CM

## 2024-04-22 DIAGNOSIS — E04.1 THYROID NODULE: ICD-10-CM

## 2024-04-22 PROCEDURE — 76536 US EXAM OF HEAD AND NECK: CPT | Performed by: INTERNAL MEDICINE

## 2024-04-22 NOTE — PROGRESS NOTES
Thyroid US done on this procedure visit  Please see dictated POC US report completed by endocrinologist    She has a stable 7mm solid nodule on the PEPE (which was not reported on her previous US)    We reviewed her thyroid labs but she admits to taking a hair supplement that has biotin.   We want her to repeat her labs in 1 month ( but she will hold biotin for 5- 7 days prior to her labs)      Patient advised to follow up as planned with labs prior    Devonte Tavarez M.D.

## 2024-05-22 ENCOUNTER — APPOINTMENT (OUTPATIENT)
Dept: MEDICAL GROUP | Facility: MEDICAL CENTER | Age: 69
End: 2024-05-22
Payer: MEDICARE

## 2024-05-22 VITALS
SYSTOLIC BLOOD PRESSURE: 132 MMHG | DIASTOLIC BLOOD PRESSURE: 70 MMHG | HEART RATE: 65 BPM | OXYGEN SATURATION: 95 % | RESPIRATION RATE: 16 BRPM | WEIGHT: 142 LBS | TEMPERATURE: 97.7 F | HEIGHT: 62 IN | BODY MASS INDEX: 26.13 KG/M2

## 2024-05-22 DIAGNOSIS — Z13.820 ENCOUNTER FOR OSTEOPOROSIS SCREENING IN ASYMPTOMATIC POSTMENOPAUSAL PATIENT: ICD-10-CM

## 2024-05-22 DIAGNOSIS — Z78.0 ENCOUNTER FOR OSTEOPOROSIS SCREENING IN ASYMPTOMATIC POSTMENOPAUSAL PATIENT: ICD-10-CM

## 2024-05-22 DIAGNOSIS — Z12.31 ENCOUNTER FOR SCREENING MAMMOGRAM FOR MALIGNANT NEOPLASM OF BREAST: ICD-10-CM

## 2024-05-22 DIAGNOSIS — Z12.12 SCREENING FOR COLORECTAL CANCER: ICD-10-CM

## 2024-05-22 DIAGNOSIS — Z12.11 SCREENING FOR COLORECTAL CANCER: ICD-10-CM

## 2024-05-22 DIAGNOSIS — E78.2 MIXED HYPERLIPIDEMIA: ICD-10-CM

## 2024-05-22 DIAGNOSIS — I10 PRIMARY HYPERTENSION: ICD-10-CM

## 2024-05-22 PROCEDURE — 3078F DIAST BP <80 MM HG: CPT | Performed by: FAMILY MEDICINE

## 2024-05-22 PROCEDURE — 3075F SYST BP GE 130 - 139MM HG: CPT | Performed by: FAMILY MEDICINE

## 2024-05-22 PROCEDURE — 99214 OFFICE O/P EST MOD 30 MIN: CPT | Performed by: FAMILY MEDICINE

## 2024-05-22 ASSESSMENT — ENCOUNTER SYMPTOMS
CHILLS: 0
PALPITATIONS: 0
FEVER: 0

## 2024-05-22 ASSESSMENT — PATIENT HEALTH QUESTIONNAIRE - PHQ9: CLINICAL INTERPRETATION OF PHQ2 SCORE: 0

## 2024-05-22 ASSESSMENT — FIBROSIS 4 INDEX: FIB4 SCORE: 0.81

## 2024-05-22 NOTE — PROGRESS NOTES
Verbal consent was acquired by the patient to use [facility] ambient listening note generation during this visit.      Nita was seen today for follow-up and lab results.    Diagnoses and all orders for this visit:    Primary hypertension    Mixed hyperlipidemia  -     Lipid Profile; Future    Screening for colorectal cancer  -     Referral to GI for Colonoscopy    Encounter for screening mammogram for malignant neoplasm of breast  -     MA-SCREENING MAMMO BILAT W/TOMOSYNTHESIS W/CAD; Future    Encounter for osteoporosis screening in asymptomatic postmenopausal patient  -     DS-BONE DENSITY STUDY (DEXA); Future                  Assessment & Plan  1. Primary hypertension.  This is a chronic and stable condition. The patient is advised to persist with the daily intake of losartan 25 mg.    2. Mixed hyperlipidemia.  This is a chronic and unstable condition. A discussion was held regarding dietary modification and regular exercise. A CT cardiac scoring will be conducted. Should the CT cardiac scoring yield abnormal results, the initiation of a statin will be considered.    Follow-up  The patient is scheduled for a follow-up visit in 3 to 4 months for laboratory tests.          Chief complaint::Diagnoses of Primary hypertension, Mixed hyperlipidemia, Screening for colorectal cancer, Encounter for screening mammogram for malignant neoplasm of breast, and Encounter for osteoporosis screening in asymptomatic postmenopausal patient were pertinent to this visit.      History of Present Illness  The patient is a 68-year-old female who is here for a blood test follow-up.    The patient underwent a blood test on 04/08/2024, as ordered by Dr. Carpenter. She has been adhering to a regimen of vitamin D, with the exception of Nutrafol, which contains biotin. However, she was advised to discontinue biotin for a period of 7 days due to its potential impact on her thyroid. She intends to continue vitamin D and discontinue Nutrafol.  "Her dietary habits are inconsistent, with a preference for blueberry waffles for breakfast, sandwiches or tuna for lunch, or dining out with her sister-in-law. Her dinner typically consists of a blend of lettuce, a burger, or chicken. She does not consume packaged food between snacks and her intake of fruits and vegetables is minimal. She maintains an active lifestyle, including walking 3 to 4 times a week.    The patient's blood pressure typically measures around 115/60s or 70s in the mornings. She is currently on losartan.      Review of Systems   Constitutional:  Negative for chills and fever.   Cardiovascular:  Negative for chest pain, palpitations and leg swelling.          Medications and Allergies:     Current Outpatient Medications   Medication Sig Dispense Refill    montelukast (SINGULAIR) 10 MG Tab TAKE 1 TABLET BY MOUTH EVERY DAY 90 Tablet 3    losartan (COZAAR) 25 MG Tab TAKE 1 TABLET BY MOUTH EVERY DAY 90 Tablet 3    azelastine (ASTELIN) 137 MCG/SPRAY nasal spray 2 sprays in each nostril Nasally Twice a day for 90 day(s)      fluticasone (FLONASE) 50 MCG/ACT nasal spray 1 spray in each nostril Nasally Once a day for 90 day(s)      pantoprazole (PROTONIX) 20 MG tablet Take 1 Tablet by mouth every day. 90 Tablet 3    ibuprofen (MOTRIN) 200 MG Tab Take 200 mg by mouth every 6 hours as needed.      cyclobenzaprine (FLEXERIL) 5 mg tablet Take 1 Tablet by mouth 3 times a day as needed for Muscle Spasms. 30 Tablet 0    vitamin D3 (CHOLECALCIFEROL) 1000 Unit (25 mcg) Tab Take 1,000 Units by mouth every day.      Multiple Vitamins-Minerals (MULTIVITAMIN ADULT PO) Take 1 Tablet by mouth every day.       No current facility-administered medications for this visit.       /70   Pulse 65   Temp 36.5 °C (97.7 °F)   Resp 16   Ht 1.575 m (5' 2\")   Wt 64.4 kg (142 lb)   SpO2 95% , Body mass index is 25.97 kg/m².      Physical Exam  Constitutional:       Appearance: Normal appearance. She is well-developed and " well-groomed.   HENT:      Head: Normocephalic and atraumatic.      Right Ear: External ear normal.      Left Ear: External ear normal.   Eyes:      General:         Right eye: No discharge.         Left eye: No discharge.      Conjunctiva/sclera: Conjunctivae normal.   Cardiovascular:      Rate and Rhythm: Normal rate.   Pulmonary:      Effort: Pulmonary effort is normal. No respiratory distress.   Musculoskeletal:      Cervical back: Neck supple.   Skin:     Findings: No rash.   Neurological:      Mental Status: She is alert.   Psychiatric:         Mood and Affect: Mood and affect normal.         Behavior: Behavior normal.            I reviewed with patient lab results resulted on 4/8/2024        Please note that this dictation was created using voice recognition software. I have made every reasonable attempt to correct obvious errors, but I expect that there are errors of grammar and possibly content that I did not discover before finalizing the note.

## 2024-05-24 DIAGNOSIS — I10 PRIMARY HYPERTENSION: ICD-10-CM

## 2024-05-24 RX ORDER — LOSARTAN POTASSIUM 25 MG/1
25 TABLET ORAL DAILY
Qty: 100 TABLET | Refills: 3 | Status: CANCELLED | OUTPATIENT
Start: 2024-05-24

## 2024-06-10 ENCOUNTER — HOSPITAL ENCOUNTER (OUTPATIENT)
Dept: RADIOLOGY | Facility: MEDICAL CENTER | Age: 69
End: 2024-06-10
Attending: FAMILY MEDICINE
Payer: COMMERCIAL

## 2024-06-10 DIAGNOSIS — E78.2 MIXED HYPERLIPIDEMIA: ICD-10-CM

## 2024-06-10 PROCEDURE — 4410556 CT-CARDIAC SCORING (SELF PAY ONLY)

## 2024-06-21 ENCOUNTER — HOSPITAL ENCOUNTER (OUTPATIENT)
Dept: LAB | Facility: MEDICAL CENTER | Age: 69
End: 2024-06-21
Attending: INTERNAL MEDICINE
Payer: MEDICARE

## 2024-06-21 DIAGNOSIS — E55.9 VITAMIN D DEFICIENCY: ICD-10-CM

## 2024-06-21 DIAGNOSIS — E04.1 THYROID NODULE: ICD-10-CM

## 2024-06-21 DIAGNOSIS — E06.3 HASHIMOTO'S DISEASE: ICD-10-CM

## 2024-06-21 LAB
T4 FREE SERPL-MCNC: 1.03 NG/DL (ref 0.93–1.7)
TSH SERPL DL<=0.005 MIU/L-ACNC: 2.09 UIU/ML (ref 0.38–5.33)

## 2024-06-21 PROCEDURE — 84443 ASSAY THYROID STIM HORMONE: CPT

## 2024-06-21 PROCEDURE — 36415 COLL VENOUS BLD VENIPUNCTURE: CPT

## 2024-06-21 PROCEDURE — 84439 ASSAY OF FREE THYROXINE: CPT

## 2024-06-25 ENCOUNTER — APPOINTMENT (OUTPATIENT)
Dept: MEDICAL GROUP | Facility: MEDICAL CENTER | Age: 69
End: 2024-06-25
Payer: MEDICARE

## 2024-06-25 PROBLEM — M17.12 DEGENERATIVE ARTHRITIS OF LEFT KNEE: Status: ACTIVE | Noted: 2024-06-25

## 2024-07-02 ENCOUNTER — TELEPHONE (OUTPATIENT)
Dept: ENDOCRINOLOGY | Facility: MEDICAL CENTER | Age: 69
End: 2024-07-02
Payer: MEDICARE

## 2024-07-16 ENCOUNTER — HOSPITAL ENCOUNTER (OUTPATIENT)
Dept: RADIOLOGY | Facility: MEDICAL CENTER | Age: 69
End: 2024-07-16
Attending: FAMILY MEDICINE
Payer: MEDICARE

## 2024-07-16 DIAGNOSIS — Z13.820 ENCOUNTER FOR OSTEOPOROSIS SCREENING IN ASYMPTOMATIC POSTMENOPAUSAL PATIENT: ICD-10-CM

## 2024-07-16 DIAGNOSIS — Z12.31 ENCOUNTER FOR SCREENING MAMMOGRAM FOR MALIGNANT NEOPLASM OF BREAST: ICD-10-CM

## 2024-07-16 DIAGNOSIS — Z78.0 ENCOUNTER FOR OSTEOPOROSIS SCREENING IN ASYMPTOMATIC POSTMENOPAUSAL PATIENT: ICD-10-CM

## 2024-07-16 PROCEDURE — 77080 DXA BONE DENSITY AXIAL: CPT

## 2024-07-16 PROCEDURE — 77063 BREAST TOMOSYNTHESIS BI: CPT | Mod: 50

## 2024-08-21 ENCOUNTER — HOSPITAL ENCOUNTER (OUTPATIENT)
Dept: RADIOLOGY | Facility: MEDICAL CENTER | Age: 69
End: 2024-08-21
Attending: ORTHOPAEDIC SURGERY
Payer: MEDICARE

## 2024-08-21 DIAGNOSIS — M17.12 PRIMARY OSTEOARTHRITIS OF LEFT KNEE: ICD-10-CM

## 2024-08-21 PROCEDURE — 73700 CT LOWER EXTREMITY W/O DYE: CPT | Mod: LT

## 2024-08-29 ENCOUNTER — OFFICE VISIT (OUTPATIENT)
Dept: MEDICAL GROUP | Facility: MEDICAL CENTER | Age: 69
End: 2024-08-29
Payer: MEDICARE

## 2024-08-29 ENCOUNTER — HOSPITAL ENCOUNTER (OUTPATIENT)
Facility: MEDICAL CENTER | Age: 69
End: 2024-08-29
Attending: FAMILY MEDICINE
Payer: MEDICARE

## 2024-08-29 VITALS
HEIGHT: 62 IN | WEIGHT: 143.41 LBS | HEART RATE: 64 BPM | BODY MASS INDEX: 26.39 KG/M2 | DIASTOLIC BLOOD PRESSURE: 70 MMHG | TEMPERATURE: 97.3 F | SYSTOLIC BLOOD PRESSURE: 122 MMHG | OXYGEN SATURATION: 98 %

## 2024-08-29 DIAGNOSIS — R30.0 DYSURIA: ICD-10-CM

## 2024-08-29 DIAGNOSIS — I10 PRIMARY HYPERTENSION: ICD-10-CM

## 2024-08-29 DIAGNOSIS — E78.2 MIXED HYPERLIPIDEMIA: ICD-10-CM

## 2024-08-29 LAB
APPEARANCE UR: CLEAR
BILIRUB UR STRIP-MCNC: NEGATIVE MG/DL
COLOR UR AUTO: YELLOW
GLUCOSE UR STRIP.AUTO-MCNC: NEGATIVE MG/DL
KETONES UR STRIP.AUTO-MCNC: NEGATIVE MG/DL
LEUKOCYTE ESTERASE UR QL STRIP.AUTO: NORMAL
NITRITE UR QL STRIP.AUTO: NEGATIVE
PH UR STRIP.AUTO: 6 [PH] (ref 5–8)
PROT UR QL STRIP: NEGATIVE MG/DL
RBC UR QL AUTO: NEGATIVE
SP GR UR STRIP.AUTO: 1.01
UROBILINOGEN UR STRIP-MCNC: 0.2 MG/DL

## 2024-08-29 PROCEDURE — 87086 URINE CULTURE/COLONY COUNT: CPT

## 2024-08-29 RX ORDER — SULFAMETHOXAZOLE/TRIMETHOPRIM 800-160 MG
1 TABLET ORAL 2 TIMES DAILY
Qty: 10 TABLET | Refills: 0 | Status: SHIPPED | OUTPATIENT
Start: 2024-08-29 | End: 2024-09-03

## 2024-08-29 RX ORDER — CHLORHEXIDINE GLUCONATE ORAL RINSE 1.2 MG/ML
SOLUTION DENTAL
COMMUNITY
Start: 2024-08-22

## 2024-08-29 ASSESSMENT — FIBROSIS 4 INDEX: FIB4 SCORE: 0.81

## 2024-08-29 ASSESSMENT — ENCOUNTER SYMPTOMS
FEVER: 0
CHILLS: 0
FLANK PAIN: 0

## 2024-08-29 NOTE — PROGRESS NOTES
Verbal consent was acquired by the patient to use [employer] ambient listening note generation during this visit.      Nita was seen today for follow-up and uti.    Diagnoses and all orders for this visit:    Dysuria  -     POCT Urinalysis  -     sulfamethoxazole-trimethoprim (BACTRIM DS) 800-160 MG tablet; Take 1 Tablet by mouth 2 times a day for 5 days.  -     URINE CULTURE(NEW); Future    Primary hypertension    Mixed hyperlipidemia                  Assessment & Plan  1. Dysuria.  This is a new condition, unstable. Symptoms include burning, pressure, and urgency. Urinalysis in office showed mild leukocyte presence. Bactrim 1 tablet two times daily for 5 days is prescribed.  Check urine culture.    2. Primary hypertension.  This is a chronic condition, stable. She will continue losartan 25 mg daily.    3. Mixed hyperlipidemia.  This is a chronic condition, unstable based on the last labs. The CT cardiac score is negative, showing no plaque buildup in the arteries. She is advised to maintain a healthy diet and exercise. Labs will be rechecked in December 2024.    4. Severe knee arthritis.  She is scheduled for knee surgery on October 1, 2024. Post-surgery, she will need to follow up with Dr. Carpenter in  dec 2024    5. Health Maintenance.  The bone scan is normal at the lower back and hip, to be rechecked in 5 years. The mammogram is normal, to be rechecked in 1 year. A gastro referral has been sent for a colonoscopy, to be scheduled after knee surgery recovery. She is advised to take calcium and vitamin D supplements and engage in strength training exercises post-surgery.    Follow-up  The patient will follow up in 6 months.          Chief complaint::Diagnoses of Dysuria, Primary hypertension, and Mixed hyperlipidemia were pertinent to this visit.      History of Present Illness  The patient is a 68-year-old female who presents for follow-up and for UTI symptoms.    She reports experiencing mild burning and  "pressure during urination, along with an intermittent need to urinate. These symptoms have persisted for approximately 1.5 weeks. She has not taken any medication for these symptoms.    Supplemental Information  She is scheduled for knee surgery on 10/01/2024 with Dr. Gongora. She is following up with Dr. Centeno for her hands. She has vitiligo which always shows up a lot more in the summer.    FAMILY HISTORY  Her mother had severe arthritis.      Review of Systems   Constitutional:  Negative for chills and fever.   Genitourinary:  Positive for dysuria, frequency and urgency. Negative for flank pain and hematuria.          Medications and Allergies:     Current Outpatient Medications   Medication Sig Dispense Refill    chlorhexidine (PERIDEX) 0.12 % Solution RINSE 1/2 OUNCE AND SPIT BY MOUTH TWICE A DAY FOR TWO WEEKS.      sulfamethoxazole-trimethoprim (BACTRIM DS) 800-160 MG tablet Take 1 Tablet by mouth 2 times a day for 5 days. 10 Tablet 0    montelukast (SINGULAIR) 10 MG Tab TAKE 1 TABLET BY MOUTH EVERY DAY 90 Tablet 3    losartan (COZAAR) 25 MG Tab TAKE 1 TABLET BY MOUTH EVERY DAY 90 Tablet 3    azelastine (ASTELIN) 137 MCG/SPRAY nasal spray 2 sprays in each nostril Nasally Twice a day for 90 day(s)      fluticasone (FLONASE) 50 MCG/ACT nasal spray 1 spray in each nostril Nasally Once a day for 90 day(s)      pantoprazole (PROTONIX) 20 MG tablet Take 1 Tablet by mouth every day. 90 Tablet 3    ibuprofen (MOTRIN) 200 MG Tab Take 200 mg by mouth every 6 hours as needed.      cyclobenzaprine (FLEXERIL) 5 mg tablet Take 1 Tablet by mouth 3 times a day as needed for Muscle Spasms. 30 Tablet 0    vitamin D3 (CHOLECALCIFEROL) 1000 Unit (25 mcg) Tab Take 1,000 Units by mouth every day.      Multiple Vitamins-Minerals (MULTIVITAMIN ADULT PO) Take 1 Tablet by mouth every day.       No current facility-administered medications for this visit.       /70   Pulse 64   Temp 36.3 °C (97.3 °F) (Temporal)   Ht 1.575 m (5' 2\") "   Wt 65.1 kg (143 lb 6.6 oz)   SpO2 98% , Body mass index is 26.23 kg/m².      Physical Exam  Constitutional:       Appearance: Normal appearance. She is well-developed and well-groomed.   HENT:      Head: Normocephalic and atraumatic.      Right Ear: External ear normal.      Left Ear: External ear normal.   Eyes:      General:         Right eye: No discharge.         Left eye: No discharge.      Conjunctiva/sclera: Conjunctivae normal.   Cardiovascular:      Rate and Rhythm: Normal rate.   Pulmonary:      Effort: Pulmonary effort is normal. No respiratory distress.   Musculoskeletal:      Cervical back: Neck supple.   Skin:     Findings: No rash.   Neurological:      Mental Status: She is alert.   Psychiatric:         Mood and Affect: Mood and affect normal.         Behavior: Behavior normal.          I reviewed with patient CT cardiac score, bone scan and mammogram resulted in June and July 2024.        Please note that this dictation was created using voice recognition software. I have made every reasonable attempt to correct obvious errors, but I expect that there are errors of grammar and possibly content that I did not discover before finalizing the note.

## 2024-08-31 ENCOUNTER — PATIENT MESSAGE (OUTPATIENT)
Dept: MEDICAL GROUP | Facility: MEDICAL CENTER | Age: 69
End: 2024-08-31
Payer: MEDICARE

## 2024-08-31 LAB
BACTERIA UR CULT: NORMAL
SIGNIFICANT IND 70042: NORMAL
SITE SITE: NORMAL
SOURCE SOURCE: NORMAL

## 2024-10-11 ENCOUNTER — HOSPITAL ENCOUNTER (EMERGENCY)
Facility: MEDICAL CENTER | Age: 69
End: 2024-10-11
Attending: EMERGENCY MEDICINE
Payer: MEDICARE

## 2024-10-11 ENCOUNTER — APPOINTMENT (OUTPATIENT)
Dept: RADIOLOGY | Facility: MEDICAL CENTER | Age: 69
End: 2024-10-11
Attending: EMERGENCY MEDICINE
Payer: MEDICARE

## 2024-10-11 VITALS
HEIGHT: 63 IN | RESPIRATION RATE: 16 BRPM | HEART RATE: 63 BPM | OXYGEN SATURATION: 96 % | WEIGHT: 141.98 LBS | TEMPERATURE: 98.3 F | DIASTOLIC BLOOD PRESSURE: 72 MMHG | SYSTOLIC BLOOD PRESSURE: 155 MMHG | BODY MASS INDEX: 25.16 KG/M2

## 2024-10-11 DIAGNOSIS — S39.012A BACK STRAIN, INITIAL ENCOUNTER: ICD-10-CM

## 2024-10-11 LAB
ALBUMIN SERPL BCP-MCNC: 4 G/DL (ref 3.2–4.9)
ALBUMIN/GLOB SERPL: 1.3 G/DL
ALP SERPL-CCNC: 179 U/L (ref 30–99)
ALT SERPL-CCNC: 25 U/L (ref 2–50)
ANION GAP SERPL CALC-SCNC: 14 MMOL/L (ref 7–16)
AST SERPL-CCNC: 13 U/L (ref 12–45)
BASOPHILS # BLD AUTO: 0.5 % (ref 0–1.8)
BASOPHILS # BLD: 0.05 K/UL (ref 0–0.12)
BILIRUB SERPL-MCNC: 0.4 MG/DL (ref 0.1–1.5)
BUN SERPL-MCNC: 14 MG/DL (ref 8–22)
CALCIUM ALBUM COR SERPL-MCNC: 9.4 MG/DL (ref 8.5–10.5)
CALCIUM SERPL-MCNC: 9.4 MG/DL (ref 8.4–10.2)
CHLORIDE SERPL-SCNC: 98 MMOL/L (ref 96–112)
CO2 SERPL-SCNC: 24 MMOL/L (ref 20–33)
CREAT SERPL-MCNC: 0.81 MG/DL (ref 0.5–1.4)
EKG IMPRESSION: NORMAL
EOSINOPHIL # BLD AUTO: 0.13 K/UL (ref 0–0.51)
EOSINOPHIL NFR BLD: 1.3 % (ref 0–6.9)
ERYTHROCYTE [DISTWIDTH] IN BLOOD BY AUTOMATED COUNT: 42.8 FL (ref 35.9–50)
GFR SERPLBLD CREATININE-BSD FMLA CKD-EPI: 79 ML/MIN/1.73 M 2
GLOBULIN SER CALC-MCNC: 3 G/DL (ref 1.9–3.5)
GLUCOSE SERPL-MCNC: 108 MG/DL (ref 65–99)
HCT VFR BLD AUTO: 36.9 % (ref 37–47)
HGB BLD-MCNC: 12.4 G/DL (ref 12–16)
IMM GRANULOCYTES # BLD AUTO: 0.09 K/UL (ref 0–0.11)
IMM GRANULOCYTES NFR BLD AUTO: 0.9 % (ref 0–0.9)
LYMPHOCYTES # BLD AUTO: 1.55 K/UL (ref 1–4.8)
LYMPHOCYTES NFR BLD: 16 % (ref 22–41)
MCH RBC QN AUTO: 29.8 PG (ref 27–33)
MCHC RBC AUTO-ENTMCNC: 33.6 G/DL (ref 32.2–35.5)
MCV RBC AUTO: 88.7 FL (ref 81.4–97.8)
MONOCYTES # BLD AUTO: 0.73 K/UL (ref 0–0.85)
MONOCYTES NFR BLD AUTO: 7.5 % (ref 0–13.4)
NEUTROPHILS # BLD AUTO: 7.15 K/UL (ref 1.82–7.42)
NEUTROPHILS NFR BLD: 73.8 % (ref 44–72)
NRBC # BLD AUTO: 0 K/UL
NRBC BLD-RTO: 0 /100 WBC (ref 0–0.2)
PLATELET # BLD AUTO: 487 K/UL (ref 164–446)
PMV BLD AUTO: 9.8 FL (ref 9–12.9)
POTASSIUM SERPL-SCNC: 3.7 MMOL/L (ref 3.6–5.5)
PROT SERPL-MCNC: 7 G/DL (ref 6–8.2)
RBC # BLD AUTO: 4.16 M/UL (ref 4.2–5.4)
SODIUM SERPL-SCNC: 136 MMOL/L (ref 135–145)
WBC # BLD AUTO: 9.7 K/UL (ref 4.8–10.8)

## 2024-10-11 PROCEDURE — 99283 EMERGENCY DEPT VISIT LOW MDM: CPT

## 2024-10-11 PROCEDURE — 85025 COMPLETE CBC W/AUTO DIFF WBC: CPT

## 2024-10-11 PROCEDURE — 36415 COLL VENOUS BLD VENIPUNCTURE: CPT

## 2024-10-11 PROCEDURE — 71275 CT ANGIOGRAPHY CHEST: CPT

## 2024-10-11 PROCEDURE — 93005 ELECTROCARDIOGRAM TRACING: CPT | Performed by: EMERGENCY MEDICINE

## 2024-10-11 PROCEDURE — 700117 HCHG RX CONTRAST REV CODE 255: Performed by: EMERGENCY MEDICINE

## 2024-10-11 PROCEDURE — 80053 COMPREHEN METABOLIC PANEL: CPT

## 2024-10-11 RX ADMIN — IOHEXOL 80 ML: 350 INJECTION, SOLUTION INTRAVENOUS at 15:25

## 2024-10-11 ASSESSMENT — FIBROSIS 4 INDEX: FIB4 SCORE: 0.81

## 2024-12-02 ENCOUNTER — TELEPHONE (OUTPATIENT)
Dept: ENDOCRINOLOGY | Facility: MEDICAL CENTER | Age: 69
End: 2024-12-02
Payer: MEDICARE

## 2024-12-02 NOTE — TELEPHONE ENCOUNTER
Called pt to remind them about upcoming apt and outstanding labs needed for next visit. Pt says she will not be able to make the apt and will be rescheduling. I have canceled pt's apt.

## 2024-12-06 DIAGNOSIS — I10 PRIMARY HYPERTENSION: ICD-10-CM

## 2024-12-06 RX ORDER — LOSARTAN POTASSIUM 25 MG/1
25 TABLET ORAL DAILY
Qty: 100 TABLET | Refills: 1 | Status: SHIPPED | OUTPATIENT
Start: 2024-12-06

## 2024-12-06 NOTE — TELEPHONE ENCOUNTER
Refill X 6 months, sent to pharmacy.Pt. Seen in the last 6 months per protocol.   Lab Results   Component Value Date/Time    SODIUM 136 10/11/2024 02:13 PM    POTASSIUM 3.7 10/11/2024 02:13 PM    CHLORIDE 98 10/11/2024 02:13 PM    CO2 24 10/11/2024 02:13 PM    GLUCOSE 108 (H) 10/11/2024 02:13 PM    BUN 14 10/11/2024 02:13 PM    CREATININE 0.81 10/11/2024 02:13 PM       Vianey Corona, Clinical Pharmacist, CDE, CACP  Managed Care Pharmacist for [facility] and [facility]

## 2024-12-21 DIAGNOSIS — K25.7 CHRONIC GASTRIC ULCER WITHOUT HEMORRHAGE AND WITHOUT PERFORATION: ICD-10-CM

## 2024-12-23 RX ORDER — PANTOPRAZOLE SODIUM 20 MG/1
20 TABLET, DELAYED RELEASE ORAL DAILY
Qty: 90 TABLET | Refills: 3 | Status: SHIPPED | OUTPATIENT
Start: 2024-12-23

## 2025-01-28 ENCOUNTER — TELEPHONE (OUTPATIENT)
Dept: ENDOCRINOLOGY | Facility: MEDICAL CENTER | Age: 70
End: 2025-01-28
Payer: MEDICARE

## 2025-01-28 NOTE — TELEPHONE ENCOUNTER
Patient called and left a voicemail to r/s appointment with  and to renew lab work. Patient would also like to know how long to hold taking Biotin before doing lab work.    Yohana can you please schedule patient.     Dr. LOPEZ can you advise on how long to hold the biotin    Please and thank you.

## 2025-02-10 ENCOUNTER — OFFICE VISIT (OUTPATIENT)
Dept: URGENT CARE | Facility: CLINIC | Age: 70
End: 2025-02-10
Payer: MEDICARE

## 2025-02-10 VITALS
OXYGEN SATURATION: 98 % | HEART RATE: 63 BPM | TEMPERATURE: 97.9 F | RESPIRATION RATE: 16 BRPM | HEIGHT: 63 IN | DIASTOLIC BLOOD PRESSURE: 67 MMHG | SYSTOLIC BLOOD PRESSURE: 124 MMHG | BODY MASS INDEX: 24.8 KG/M2 | WEIGHT: 140 LBS

## 2025-02-10 DIAGNOSIS — J01.90 ACUTE BACTERIAL SINUSITIS: ICD-10-CM

## 2025-02-10 DIAGNOSIS — B96.89 ACUTE BACTERIAL SINUSITIS: ICD-10-CM

## 2025-02-10 PROCEDURE — 3074F SYST BP LT 130 MM HG: CPT

## 2025-02-10 PROCEDURE — 3078F DIAST BP <80 MM HG: CPT

## 2025-02-10 PROCEDURE — 99213 OFFICE O/P EST LOW 20 MIN: CPT

## 2025-02-10 RX ORDER — METHYLPREDNISOLONE 4 MG/1
TABLET ORAL
Qty: 21 TABLET | Refills: 0 | Status: SHIPPED | OUTPATIENT
Start: 2025-02-10 | End: 2025-02-28

## 2025-02-10 ASSESSMENT — FIBROSIS 4 INDEX: FIB4 SCORE: 0.37

## 2025-02-10 ASSESSMENT — ENCOUNTER SYMPTOMS
SORE THROAT: 0
SINUS PAIN: 1
SINUS PRESSURE: 1
VOMITING: 0
DIARRHEA: 0
ABDOMINAL PAIN: 0
NAUSEA: 0
MYALGIAS: 0
SHORTNESS OF BREATH: 0
HEADACHES: 0
CHILLS: 0
COUGH: 0
FEVER: 0

## 2025-02-10 NOTE — PROGRESS NOTES
"Subjective:   Nita Sepulveda is a 69 y.o. female who presents for Sinusitis (Patient coming in for possible sinus infection, pressure x 1 week )      Sinusitis  This is a new problem. The current episode started 1 to 4 weeks ago. The problem has been gradually worsening since onset. There has been no fever. Associated symptoms include congestion, ear pain (Right ear) and sinus pressure. Pertinent negatives include no chills, coughing, headaches, shortness of breath or sore throat. Past treatments include nasal decongestants and saline nose sprays (Astelin/Flonase). The treatment provided no relief.       Review of Systems   Constitutional:  Negative for chills, fever and malaise/fatigue.   HENT:  Positive for congestion, ear pain (Right ear), sinus pressure and sinus pain (Right maxillary/frontal). Negative for hearing loss and sore throat.    Respiratory:  Negative for cough and shortness of breath.    Cardiovascular:  Negative for chest pain.   Gastrointestinal:  Negative for abdominal pain, diarrhea, nausea and vomiting.   Genitourinary:  Negative for dysuria.   Musculoskeletal:  Negative for myalgias.   Skin:  Negative for rash.   Neurological:  Negative for headaches.       Medications, Allergies, and current problem list reviewed today in Epic.     Objective:     /67   Pulse 63   Temp 36.6 °C (97.9 °F)   Resp 16   Ht 1.588 m (5' 2.5\")   Wt 63.5 kg (140 lb)   SpO2 98%     Physical Exam  Vitals and nursing note reviewed.   Constitutional:       Appearance: Normal appearance.   HENT:      Head: Normocephalic and atraumatic.      Right Ear: A middle ear effusion is present. Tympanic membrane is not perforated, erythematous or bulging.      Left Ear: Tympanic membrane normal.      Nose: Congestion present.      Right Turbinates: Enlarged.      Left Turbinates: Enlarged.      Right Sinus: Maxillary sinus tenderness and frontal sinus tenderness present.      Mouth/Throat:      Mouth: Mucous membranes " are moist.      Pharynx: Postnasal drip present. No pharyngeal swelling, oropharyngeal exudate, posterior oropharyngeal erythema or uvula swelling.      Tonsils: No tonsillar exudate or tonsillar abscesses.   Eyes:      Conjunctiva/sclera: Conjunctivae normal.   Cardiovascular:      Rate and Rhythm: Normal rate.      Heart sounds: Normal heart sounds.   Pulmonary:      Effort: Pulmonary effort is normal.   Abdominal:      General: Abdomen is flat.      Palpations: Abdomen is soft.   Musculoskeletal:         General: Normal range of motion.      Cervical back: Normal range of motion.   Skin:     General: Skin is warm and dry.      Capillary Refill: Capillary refill takes less than 2 seconds.   Neurological:      Mental Status: She is alert and oriented to person, place, and time.   Psychiatric:         Mood and Affect: Mood normal.         Behavior: Behavior normal.         Assessment/Plan:       1. Acute bacterial sinusitis  amoxicillin-clavulanate (AUGMENTIN) 875-125 MG Tab    methylPREDNISolone (MEDROL DOSEPAK) 4 MG Tablet Therapy Pack        After assessment does appear the patient has acute sinusitis and at this time was prescribed Augmentin and Medrol Dosepak.  Patient instructed to use as prescribed.  Patient instructed to monitor for any worsening signs and symptoms of any other concerns patient was instructed return to urgent care for reevaluation.    Take full course of antibiotic  Tylenol and Motrin for fever and pain  OTC meds as discussed including oral decongestant if tolerated  Increase fluids and rest  Nasal spray, nasal rinse/wash, malka pot    Differential diagnosis, natural history, and supportive care discussed. We also reviewed side effects of medication including allergic response, GI upset, tendon injury, rash, sedation etc. Patient and/or guardian voices understanding.      Advised the patient to follow-up with the primary care physician for recheck, reevaluation, and consideration of further  management.    I personally reviewed prior external notes and test results pertinent to today's visit as well as additional imaging and testing completed in clinic today.     Please note that this dictation was created using voice recognition software. I have made every reasonable attempt to correct obvious errors, but I expect that there are errors of grammar and possibly content that I did not discover before finalizing the note.    This note was electronically signed by JUAN J Santo

## 2025-02-21 ENCOUNTER — PATIENT MESSAGE (OUTPATIENT)
Dept: MEDICAL GROUP | Facility: MEDICAL CENTER | Age: 70
End: 2025-02-21
Payer: MEDICARE

## 2025-02-22 ENCOUNTER — HOSPITAL ENCOUNTER (OUTPATIENT)
Dept: LAB | Facility: MEDICAL CENTER | Age: 70
End: 2025-02-22
Attending: FAMILY MEDICINE
Payer: MEDICARE

## 2025-02-22 DIAGNOSIS — E78.2 MIXED HYPERLIPIDEMIA: ICD-10-CM

## 2025-02-22 LAB
CHOLEST SERPL-MCNC: 265 MG/DL (ref 100–199)
FASTING STATUS PATIENT QL REPORTED: NORMAL
HDLC SERPL-MCNC: 62 MG/DL
LDLC SERPL CALC-MCNC: 182 MG/DL
TRIGL SERPL-MCNC: 104 MG/DL (ref 0–149)

## 2025-02-22 PROCEDURE — 80061 LIPID PANEL: CPT

## 2025-02-22 PROCEDURE — 36415 COLL VENOUS BLD VENIPUNCTURE: CPT

## 2025-02-24 ENCOUNTER — RESULTS FOLLOW-UP (OUTPATIENT)
Dept: MEDICAL GROUP | Facility: MEDICAL CENTER | Age: 70
End: 2025-02-24

## 2025-02-28 ENCOUNTER — OFFICE VISIT (OUTPATIENT)
Dept: MEDICAL GROUP | Facility: MEDICAL CENTER | Age: 70
End: 2025-02-28
Payer: MEDICARE

## 2025-02-28 VITALS
WEIGHT: 147.49 LBS | TEMPERATURE: 98.3 F | OXYGEN SATURATION: 96 % | SYSTOLIC BLOOD PRESSURE: 120 MMHG | HEIGHT: 63 IN | DIASTOLIC BLOOD PRESSURE: 70 MMHG | BODY MASS INDEX: 26.13 KG/M2 | HEART RATE: 69 BPM

## 2025-02-28 DIAGNOSIS — I10 PRIMARY HYPERTENSION: ICD-10-CM

## 2025-02-28 DIAGNOSIS — E06.3 HASHIMOTO'S DISEASE: ICD-10-CM

## 2025-02-28 DIAGNOSIS — E78.2 MIXED HYPERLIPIDEMIA: ICD-10-CM

## 2025-02-28 DIAGNOSIS — J01.41 ACUTE RECURRENT PANSINUSITIS: ICD-10-CM

## 2025-02-28 DIAGNOSIS — R73.01 IMPAIRED FASTING BLOOD SUGAR: ICD-10-CM

## 2025-02-28 RX ORDER — ROSUVASTATIN CALCIUM 10 MG/1
10 TABLET, COATED ORAL EVERY EVENING
Qty: 100 TABLET | Refills: 3 | Status: SHIPPED | OUTPATIENT
Start: 2025-02-28 | End: 2026-04-04

## 2025-02-28 RX ORDER — CEFDINIR 300 MG/1
300 CAPSULE ORAL 2 TIMES DAILY
Qty: 28 CAPSULE | Refills: 0 | Status: SHIPPED | OUTPATIENT
Start: 2025-02-28 | End: 2025-03-14

## 2025-02-28 ASSESSMENT — ENCOUNTER SYMPTOMS
CHILLS: 0
SINUS PAIN: 1
FEVER: 0

## 2025-02-28 ASSESSMENT — FIBROSIS 4 INDEX: FIB4 SCORE: 0.37

## 2025-02-28 ASSESSMENT — PATIENT HEALTH QUESTIONNAIRE - PHQ9: CLINICAL INTERPRETATION OF PHQ2 SCORE: 0

## 2025-02-28 NOTE — PROGRESS NOTES
Verbal consent was acquired by the patient to use [employer] ambient listening note generation during this visit.      Nita was seen today for follow-up.    Diagnoses and all orders for this visit:    Acute recurrent pansinusitis  -     cefdinir (OMNICEF) 300 MG Cap; Take 1 Capsule by mouth 2 times a day for 14 days.    Mixed hyperlipidemia  -     rosuvastatin (CRESTOR) 10 MG Tab; Take 1 Tablet by mouth every evening.  -     Lipid Profile; Future    Primary hypertension  -     CBC WITH DIFFERENTIAL; Future  -     Comp Metabolic Panel; Future    Hashimoto's disease  -     TSH; Future  -     FREE THYROXINE; Future    Impaired fasting blood sugar  -     HEMOGLOBIN A1C; Future                  Assessment & Plan  1 Acute recurrent pansinusitis  New condition, unstable   - Prescribed a 14-day course of Omnicef 300 mg, to be taken once in the morning and once at night  - Advised to use Flonase nasal spray in conjunction with Astelin nasal spray  - Prescriptions sent to [facility] in [place]    2. Hypercholesterolemia:  Chronic condition, unstable  - Elevated cholesterol levels for an extended period, increasing risk of stroke and fatty liver disease  - Normal cardiac evaluation, but stroke risk remains with this much high cholesterol  - Start Crestor at a low dose of 5 mg, with the option to increase to 10 mg based on tolerance  - Re-evaluation of cholesterol levels in approximately 3 to 4 months    3.  Primary hypertension  Chronic condition, stable, continue losartan 25 mg daily, recheck labs.    4.  Hashimoto's thyroiditis  Chronic condition, stable, currently not on any medications.  Check thyroid function Test     5.  Impaired fasting glucose  Chronic condition, stable, recheck labs with next test.      Follow-up:  - Scheduled for a follow-up visit in 4 months for annual physical and lab follow-up.    PROCEDURE  The patient underwent a total knee replacement surgery in October 2024.          Chief complaint::Diagnoses of  Acute recurrent pansinusitis, Mixed hyperlipidemia, Primary hypertension, Hashimoto's disease, and Impaired fasting blood sugar were pertinent to this visit.      History of Present Illness  The patient presents for evaluation of sinus pain and hypercholesterolemia.    Sinus Pain  - She has been experiencing sinus pain for approximately 1 month, which has persisted despite a course of amoxicillin prescribed by urgent care.  - She also reports persistent sinus congestion and the presence of phlegm in her throat, causing her to gag.  - She typically experiences these symptoms biannually.  - She is scheduled to travel to [place] on 03/06/2025 and will return on 03/20/2025, and she is concerned about managing her symptoms during this period.  - She was prescribed a course of steroids, which she completed.  - She has been using Astelin nasal spray and also has Flonase nasal spray at home.    Hypercholesterolemia  - She recently underwent a cholesterol panel test.  - She acknowledges the need to address her elevated cholesterol levels, which have been a long-standing issue.  - She attributes her inability to manage her cholesterol levels to a recent knee surgery, which has limited her physical activity.  - She has a family history of hypercholesterolemia, with her father, sister, and brother all having high cholesterol levels.  - She is open to the possibility of medication management for her cholesterol.  - She recalls being on statin therapy several years ago, but discontinued it due to muscle pain.    Supplemental information: She had a total knee replacement surgery in October 2024. Dr. Templeton thought she might have a blood clot, so he sent her to the ER downstairs. She has received a kit for colonoscopy but has not yet utilized it.    FAMILY HISTORY  - Father, sister, and brother all have high cholesterol    MEDICATIONS  - Current: Astelin nasal spray  - Current: Flonase nasal spray  - Past:  "Amoxicillin      Review of Systems   Constitutional:  Negative for chills and fever.   HENT:  Positive for congestion and sinus pain.           Medications and Allergies:     Current Outpatient Medications   Medication Sig Dispense Refill    cefdinir (OMNICEF) 300 MG Cap Take 1 Capsule by mouth 2 times a day for 14 days. 28 Capsule 0    rosuvastatin (CRESTOR) 10 MG Tab Take 1 Tablet by mouth every evening. 100 Tablet 3    pantoprazole (PROTONIX) 20 MG tablet TAKE 1 TABLET BY MOUTH EVERY DAY 90 Tablet 3    losartan (COZAAR) 25 MG Tab Take 1 Tablet by mouth every day. 100 Tablet 1    ibuprofen (MOTRIN) 800 MG Tab TAKE 1 TABLET BY MOUTH EVERY 8 HOURS AS NEEDED FOR INFLAMMATION OR MODERATE PAIN. 90 Tablet 0    chlorhexidine (PERIDEX) 0.12 % Solution RINSE 1/2 OUNCE AND SPIT BY MOUTH TWICE A DAY FOR TWO WEEKS.      montelukast (SINGULAIR) 10 MG Tab TAKE 1 TABLET BY MOUTH EVERY DAY 90 Tablet 3    azelastine (ASTELIN) 137 MCG/SPRAY nasal spray 2 sprays in each nostril Nasally Twice a day for 90 day(s)      fluticasone (FLONASE) 50 MCG/ACT nasal spray 1 spray in each nostril Nasally Once a day for 90 day(s)      ibuprofen (MOTRIN) 200 MG Tab Take 200 mg by mouth every 6 hours as needed.      cyclobenzaprine (FLEXERIL) 5 mg tablet Take 1 Tablet by mouth 3 times a day as needed for Muscle Spasms. 30 Tablet 0    Multiple Vitamins-Minerals (MULTIVITAMIN ADULT PO) Take 1 Tablet by mouth every day.       No current facility-administered medications for this visit.       /70 (BP Location: Left arm, Patient Position: Sitting, BP Cuff Size: Adult)   Pulse 69   Temp 36.8 °C (98.3 °F) (Temporal)   Ht 1.588 m (5' 2.5\")   Wt 66.9 kg (147 lb 7.8 oz)   SpO2 96% , Body mass index is 26.55 kg/m².      Physical Exam  Constitutional:       Appearance: Normal appearance. She is well-developed and well-groomed.   HENT:      Head: Normocephalic and atraumatic.      Right Ear: External ear normal.      Left Ear: External ear normal.    "   Nose:      Right Sinus: Maxillary sinus tenderness and frontal sinus tenderness present.      Left Sinus: Maxillary sinus tenderness and frontal sinus tenderness present.   Eyes:      General:         Right eye: No discharge.         Left eye: No discharge.      Conjunctiva/sclera: Conjunctivae normal.   Cardiovascular:      Rate and Rhythm: Normal rate.   Pulmonary:      Effort: Pulmonary effort is normal. No respiratory distress.   Musculoskeletal:      Cervical back: Neck supple.   Skin:     Findings: No rash.   Neurological:      Mental Status: She is alert.   Psychiatric:         Mood and Affect: Mood and affect normal.         Behavior: Behavior normal.            I reviewed with patient lab results resulted on        Please note that this dictation was created using voice recognition software. I have made every reasonable attempt to correct obvious errors, but I expect that there are errors of grammar and possibly content that I did not discover before finalizing the note.

## 2025-03-18 NOTE — PATIENT INSTRUCTIONS
Detail Level: Detailed
Heat and ice therapy   Prescription ibuprofen every 8 hours for pain    
Detail Level: Generalized
Detail Level: Simple

## 2025-03-27 ENCOUNTER — TELEPHONE (OUTPATIENT)
Dept: ENDOCRINOLOGY | Facility: MEDICAL CENTER | Age: 70
End: 2025-03-27

## 2025-03-27 ENCOUNTER — APPOINTMENT (OUTPATIENT)
Dept: MEDICAL GROUP | Facility: MEDICAL CENTER | Age: 70
End: 2025-03-27
Payer: MEDICARE

## 2025-03-28 ENCOUNTER — OFFICE VISIT (OUTPATIENT)
Dept: MEDICAL GROUP | Facility: MEDICAL CENTER | Age: 70
End: 2025-03-28
Payer: MEDICARE

## 2025-03-28 ENCOUNTER — HOSPITAL ENCOUNTER (OUTPATIENT)
Facility: MEDICAL CENTER | Age: 70
End: 2025-03-28
Attending: INTERNAL MEDICINE
Payer: MEDICARE

## 2025-03-28 VITALS
HEART RATE: 59 BPM | BODY MASS INDEX: 27.18 KG/M2 | SYSTOLIC BLOOD PRESSURE: 138 MMHG | WEIGHT: 147.71 LBS | DIASTOLIC BLOOD PRESSURE: 82 MMHG | TEMPERATURE: 98.2 F | OXYGEN SATURATION: 96 % | HEIGHT: 62 IN

## 2025-03-28 DIAGNOSIS — G89.29 CHRONIC NONINTRACTABLE HEADACHE, UNSPECIFIED HEADACHE TYPE: ICD-10-CM

## 2025-03-28 DIAGNOSIS — J32.4 CHRONIC PANSINUSITIS: ICD-10-CM

## 2025-03-28 DIAGNOSIS — H54.7 VISION PROBLEM: ICD-10-CM

## 2025-03-28 DIAGNOSIS — E06.3 HASHIMOTO'S DISEASE: ICD-10-CM

## 2025-03-28 DIAGNOSIS — E55.9 VITAMIN D DEFICIENCY: ICD-10-CM

## 2025-03-28 DIAGNOSIS — Z12.11 COLON CANCER SCREENING: ICD-10-CM

## 2025-03-28 DIAGNOSIS — E04.1 THYROID NODULE: ICD-10-CM

## 2025-03-28 DIAGNOSIS — R51.9 CHRONIC NONINTRACTABLE HEADACHE, UNSPECIFIED HEADACHE TYPE: ICD-10-CM

## 2025-03-28 LAB
25(OH)D3 SERPL-MCNC: 29 NG/ML (ref 30–100)
ALBUMIN SERPL BCP-MCNC: 4.2 G/DL (ref 3.2–4.9)
ALBUMIN/GLOB SERPL: 1.8 G/DL
ALP SERPL-CCNC: 102 U/L (ref 30–99)
ALT SERPL-CCNC: 14 U/L (ref 2–50)
ANION GAP SERPL CALC-SCNC: 12 MMOL/L (ref 7–16)
AST SERPL-CCNC: 20 U/L (ref 12–45)
BILIRUB SERPL-MCNC: <0.2 MG/DL (ref 0.1–1.5)
BUN SERPL-MCNC: 13 MG/DL (ref 8–22)
CALCIUM ALBUM COR SERPL-MCNC: 8.9 MG/DL (ref 8.5–10.5)
CALCIUM SERPL-MCNC: 9.1 MG/DL (ref 8.5–10.5)
CHLORIDE SERPL-SCNC: 103 MMOL/L (ref 96–112)
CO2 SERPL-SCNC: 23 MMOL/L (ref 20–33)
CREAT SERPL-MCNC: 0.87 MG/DL (ref 0.5–1.4)
FASTING STATUS PATIENT QL REPORTED: NORMAL
GFR SERPLBLD CREATININE-BSD FMLA CKD-EPI: 72 ML/MIN/1.73 M 2
GLOBULIN SER CALC-MCNC: 2.4 G/DL (ref 1.9–3.5)
GLUCOSE SERPL-MCNC: 94 MG/DL (ref 65–99)
POTASSIUM SERPL-SCNC: 4.1 MMOL/L (ref 3.6–5.5)
PROT SERPL-MCNC: 6.6 G/DL (ref 6–8.2)
SODIUM SERPL-SCNC: 138 MMOL/L (ref 135–145)
T4 FREE SERPL-MCNC: 0.94 NG/DL (ref 0.93–1.7)
TSH SERPL DL<=0.005 MIU/L-ACNC: 2.23 UIU/ML (ref 0.38–5.33)

## 2025-03-28 PROCEDURE — 84439 ASSAY OF FREE THYROXINE: CPT

## 2025-03-28 PROCEDURE — 3075F SYST BP GE 130 - 139MM HG: CPT | Performed by: FAMILY MEDICINE

## 2025-03-28 PROCEDURE — 82306 VITAMIN D 25 HYDROXY: CPT

## 2025-03-28 PROCEDURE — 3079F DIAST BP 80-89 MM HG: CPT | Performed by: FAMILY MEDICINE

## 2025-03-28 PROCEDURE — 80053 COMPREHEN METABOLIC PANEL: CPT

## 2025-03-28 PROCEDURE — 84443 ASSAY THYROID STIM HORMONE: CPT

## 2025-03-28 PROCEDURE — 99214 OFFICE O/P EST MOD 30 MIN: CPT | Performed by: FAMILY MEDICINE

## 2025-03-28 PROCEDURE — 36415 COLL VENOUS BLD VENIPUNCTURE: CPT

## 2025-03-28 RX ORDER — METHYLPREDNISOLONE 4 MG/1
TABLET ORAL
Qty: 21 TABLET | Refills: 0 | Status: SHIPPED | OUTPATIENT
Start: 2025-03-28

## 2025-03-28 ASSESSMENT — ENCOUNTER SYMPTOMS
DIZZINESS: 1
CHILLS: 0
HEADACHES: 1
FEVER: 0

## 2025-03-28 ASSESSMENT — FIBROSIS 4 INDEX: FIB4 SCORE: 0.37

## 2025-03-28 NOTE — PROGRESS NOTES
Verbal consent was acquired by the patient to use [employer] ambient listening note generation during this visit.      Nita was seen today for follow-up.    Diagnoses and all orders for this visit:    Chronic pansinusitis  -     CT-LOCALIZATION LIMITED SINUSES; Future  -     methylPREDNISolone (MEDROL DOSEPAK) 4 MG Tablet Therapy Pack; As directed on the packaging label.    Chronic nonintractable headache, unspecified headache type    Vision problem  -     Referral to Ophthalmology                  Assessment & Plan  1. Persistent headache, chronic sinusitis  Chronic condition, unstable  - Persistent headache primarily in the frontal and maxillary regions  - No improvement with antibiotic therapy  - Order CT scan of the sinuses to evaluate need for potential ENT procedure  - Referral to ENT specialist  - Prescribe 6-day course of steroids to manage inflammation and potentially alleviate headache  - Steroid pack: 6 tablets on the first day, then tapering down by one tablet each day until completion    2. Dizziness  - Occasional dizziness possibly related to sinus issues  - Further evaluation by ENT specialist    3. Ear discomfort  - Ear discomfort possibly related to sinus issues  - Further evaluation by ENT specialist  - Steroid pack may help reduce any fluid in the ear      4. Hashimoto's disease  - Upcoming appointment with Dr. Carpenter on 07/07/2025 to manage Hashimoto's disease  - Recent blood tests conducted, results pending  - Symptoms not believed to be caused by Hashimoto's disease    7. Ophthalmology referral  - Referral to ophthalmologist, Dr. Curry at [facility]  - Ensure vision is not contributing to headache symptoms    Follow-up in June for regular follow-up.      Chief complaint::Diagnoses of Chronic pansinusitis, Chronic nonintractable headache, unspecified headache type, and Vision problem were pertinent to this visit.      History of Present Illness  The patient is a 69-year-old female  who presents for evaluation of persistent headache symptoms.    Headache  - Intermittent headaches localized to the forehead and occipital region  - Severity rating of 8 out of 10  - Predominantly present in the evening  - No visual disturbances, numbness, tingling, or weakness  - Experiences dizziness  - Managing pain with Tylenol and Motrin  - Using Flonase nasal spray  - Discontinued use of neti pot due to ear discomfort  - Previous steroid treatment did not alleviate symptoms    Swelling and Leg Cramps  - Reports swelling in hands  - Reports leg cramps  - Does not think it is related to headaches    Hashimoto's Disease  - History of Hashimoto's disease  - Scheduled to see Dr. Carpenter on 04/07/2025  - Speculates current symptoms may be related to a flare-up    Ear Discomfort  - Experiencing ear discomfort  - Uncertain if related to an inner ear issue    Supplemental information: Upcoming appointment with an ophthalmologist on 04/03/2025.        Review of Systems   Constitutional:  Negative for chills and fever.   HENT:  Positive for congestion and ear pain.    Neurological:  Positive for dizziness and headaches.          Medications and Allergies:     Current Outpatient Medications   Medication Sig Dispense Refill    methylPREDNISolone (MEDROL DOSEPAK) 4 MG Tablet Therapy Pack As directed on the packaging label. 21 Tablet 0    rosuvastatin (CRESTOR) 10 MG Tab Take 1 Tablet by mouth every evening. 100 Tablet 3    pantoprazole (PROTONIX) 20 MG tablet TAKE 1 TABLET BY MOUTH EVERY DAY 90 Tablet 3    losartan (COZAAR) 25 MG Tab Take 1 Tablet by mouth every day. 100 Tablet 1    ibuprofen (MOTRIN) 800 MG Tab TAKE 1 TABLET BY MOUTH EVERY 8 HOURS AS NEEDED FOR INFLAMMATION OR MODERATE PAIN. 90 Tablet 0    montelukast (SINGULAIR) 10 MG Tab TAKE 1 TABLET BY MOUTH EVERY DAY 90 Tablet 3    azelastine (ASTELIN) 137 MCG/SPRAY nasal spray 2 sprays in each nostril Nasally Twice a day for 90 day(s)      fluticasone (FLONASE) 50  "MCG/ACT nasal spray 1 spray in each nostril Nasally Once a day for 90 day(s)      ibuprofen (MOTRIN) 200 MG Tab Take 200 mg by mouth every 6 hours as needed.      cyclobenzaprine (FLEXERIL) 5 mg tablet Take 1 Tablet by mouth 3 times a day as needed for Muscle Spasms. 30 Tablet 0    Multiple Vitamins-Minerals (MULTIVITAMIN ADULT PO) Take 1 Tablet by mouth every day.       No current facility-administered medications for this visit.       /82 (BP Location: Left arm, Patient Position: Sitting, BP Cuff Size: Adult)   Pulse (!) 59   Temp 36.8 °C (98.2 °F) (Temporal)   Ht 1.562 m (5' 1.5\")   Wt 67 kg (147 lb 11.3 oz)   SpO2 96% , Body mass index is 27.46 kg/m².      Physical Exam  Constitutional:       Appearance: Normal appearance. She is well-developed and well-groomed.   HENT:      Head: Normocephalic and atraumatic.      Right Ear: External ear normal. Tympanic membrane is bulging. Tympanic membrane is not erythematous.      Left Ear: External ear normal.      Nose:      Right Sinus: Maxillary sinus tenderness and frontal sinus tenderness present.      Left Sinus: Maxillary sinus tenderness and frontal sinus tenderness present.   Eyes:      General:         Right eye: No discharge.         Left eye: No discharge.      Conjunctiva/sclera: Conjunctivae normal.   Cardiovascular:      Rate and Rhythm: Normal rate.   Pulmonary:      Effort: Pulmonary effort is normal. No respiratory distress.   Musculoskeletal:      Cervical back: Neck supple.   Skin:     Findings: No rash.   Neurological:      Mental Status: She is alert.   Psychiatric:         Mood and Affect: Mood and affect normal.         Behavior: Behavior normal.              Please note that this dictation was created using voice recognition software. I have made every reasonable attempt to correct obvious errors, but I expect that there are errors of grammar and possibly content that I did not discover before finalizing the note.      "

## 2025-03-29 DIAGNOSIS — R09.82 POSTNASAL DRIP: ICD-10-CM

## 2025-04-01 RX ORDER — MONTELUKAST SODIUM 10 MG/1
10 TABLET ORAL DAILY
Qty: 90 TABLET | Refills: 0 | Status: SHIPPED | OUTPATIENT
Start: 2025-04-01

## 2025-04-01 NOTE — Clinical Note
[facility]  [address] [address]    [id number]    Nita Sepulveda  [address] [place] [address]    April 1, 2025    Member Name: Nita Sepulveda   Member Number: [id number]   Reference Number: [id number]   Approved Services: MRI/CAT Scan   Approved Service Dates: 03/28/2025 - 07/30/2025   Requesting Provider: Gael Patel   Requested Provider: [facility]     Dear Nita Sepulveda:    The following medical service(s) requested by Gael Patel have been approved:    Procedure Code Procedure Code Name Requested Quantity Approved Quantity Status   90521 (CPT®) LA CT SCAN,MAXILLOFACIAL AREA,W/O CONTRAST 1 1 Authorized       Approved Quantity means the number of visits approved for medication treatments and/or medical services.    The services should be provided by [facility] no later than 07/30/2025. Please contact the provider listed below with any questions.     Provider Information:  [facility]  [phone]    Your plan benefit may require a deductible, co-payment or coinsurance for these services. This authorization does not guarantee [facility] will pay the claim for services that you receive. Payment by [facility] for these services is subject to the terms of your Evidence of Coverage, your eligibility at the time of service, and confirmation of benefit coverage.    For any questions or additional information, please contact Customer Service:    [facility] Toll Free: [phone]  [employer] users dial: [phone]   Call Center Hours:  Oct 1 - Mar 31, Mon - Fri 7 AM to 8 PM PST  Oct 1 - Mar 31, Sat - Sun 8 AM to 8 PM PST  Apr 1 - Sep 30, Mon - Fri 7 AM to 8 PM PST   Office Hours: Mon - Fri 8 AM to 5 PM PST   E-mail: [email or web address]   Website:  [email or web address]      This information is available for free in other languages. Please contact Customer Service at  the phone number above for more information. [facility] complies with applicable Federal civil rights laws and does not discriminate on the basis of race, color, national origin, age, disability or sex.    Sincerely,     Healthcare Utilization Management Department     Cc: [facility]   Gael Patel    Multi-Language Insert  Multi- Services  English: We have free  services to answer any questions you may have about our health or drug plan.  To get an , just call us at [phone].  Someone who speaks English/Language can help you.  This is a free service.  Yemeni: Tenemos servicios de intérprete sin costo alguno  para responder cualquier pregunta que pueda tener sobre nuestro plan de taty o medicamentos. Para hablar con un intérprete, por favor llame al [phone]. Alguien que hable español le podrá ayudar. Lolly es un servicio gratuito.  Chinese Mandarin: ?????????????????????????????? ???????????????? [phone]????????????????? ?????????  Chinese Cantonese: ?????????????????????????????? ????????????? [phone]???????????????????? ????????  Tagalog:  Jonatan richey serbisyo sa baezsakurt tadeoa loreto shingjessica bowers o panggamot.  antelmo Rivera  [phone]. Luba lynne ng Tagalog.  Fred grace.  Arabic:  Nous proposons chester services gratuits d'interprétation pour répondre à toutes arturo questions relatives à notre régime de santé ou d'assurance-médicaments. Pour accéder au service d'interprétation, il vous suffit de nous appeler au [phone]. Un interlocuteur parlant Françs pourra vous aider. Ce service est gratuit.  Argentine:  Anamika caicedo có d?ch v? thông d?ch mi?n phí ð? tr? l?i các câu h?i v? chýõng s?c kh?e và chýõng trình thu?c men. N?u quí v? c?n thông  d?ch viên matthew g?i [phone] s? có nhân viên nói ti?ng Vi?t giúp ð? quí v?. Ðây là d?ch v? mi?n phí .  Croatian:  Unser kostenser Dolmetscherservice beantwortet Ihren Fragen zu unserem Gesundheits- und Arzneimittelplan. Unsere Dolmetscher erreichen Sie [phone]. Man wird Ihnen gerardo auf [facility]. Dieser Service ist [facility].  Hungarian:  ??? ?? ?? ?? ?? ??? ?? ??? ?? ???? ?? ?? ???? ???? ????. ?? ???? ????? ?? [phone] ??? ??? ????.  ???? ?? ???? ?? ?? ????. ? ???? ??? ?????.   Bolivian: Åñëè ó âàñ âîçíèêíóò âîïðîñû îòíîñèòåëüíî ñòðàõîâîãî èëè ìåäèêàìåíòíîãî ïëàíà, âû ìîæåòå âîñïîëüçîâàòüñÿ íàøèìè áåñïëàòíûìè óñëóãàìè ïåðåâîä÷èêîâ. ×òîáû âîñïîëüçîâàòüñÿ óñëóãàìè ïåðåâîä÷èêà, ïîçâîíèòå íàì ïî òåëåôîíó [phone]. Âàì îêàæåò ïîìîùü ñîòðóäíèê, êîòîðûé ãîâîðèò ïî-póññêè. Äàííàÿ óñëóãà áåñïëàòíàÿ.  Slovenian: ÅääÇ äÞÏã ÎÏãÇÊ ÇáãÊÑÌã ÇáÝæÑí ÇáãÌÇäíÉ ááÅÌÇÈÉ Úä Ãí ÃÓÆáÉ ÊÊÚáÞ ÈÇáÕÍÉ Ãæ ÌÏæá ÇáÃÏæíÉ áÏíäÇ. ááÍÕæá Úáì ãÊÑÌã ÝæÑí¡ áíÓ Úáíß Óæì ÇáÇÊÕÇá ÈäÇ Úáì [phone] . ÓíÞæã ÔÎÕ ãÇ íÊÍÏË ÇáÚÑÈíÉ ÈãÓÇÚÏÊß. åÐå ÎÏãÉ ãÌÇäíÉ.  Cassi: ????? ????????? ?? ??? ?? ????? ?? ???? ??? ???? ???? ?? ?????? ?? ???? ???? ?? ??? ????? ??? ????? ???????? ?????? ?????? ???. ?? ???????? ??????? ???? ?? ???, ?? ???? [phone] ?? ??? ????. ??? ??????? ?? ?????? ????? ?? ???? ??? ?? ???? ??. ?? ?? ????? ???? ??.   Macedonian:  È disponibile un servizio di interpretariato gratuito per rispondere a eventuali domande sul nostro piano sanitario e farmaceutico. Per un interprete, contattare il arnoldo [phone]. Un nostro incaricato frederick parla Italianovi fornirà l'assistenza necessaria. È un servizio gratuito.  Portugués:  Dispomos de serviços de interpretação gratuitos para responder a qualquer questão que tenha acerca do nosso plano de saúde ou de medicação. Para obter um intérprete, contacte-nos através do número [phone]. Irá encontrar alguém que fale o idioma  Português para o ajudar. Lolly serviço é  gratuito.  Greenlandic Creole:  Nou genyen sèvis entèprèt gratis gal reponn tout kesyon ou ta genyen konsènan plan medikal oswa dwòg nou an.  Gal jwenn yon entèprèt, jis rele nou nan [phone]. Yon moun ki pale K[address] kapab km w.  Sa a se yon sèvis ki gratis.  Polish:  Umo¿liwiamy bezp³atne skorzystanie z us³ug t³umacza ustnego, który pomo¿e w uzyskaniu odpowiedzi na temat planu zdrowotnego lub dawkowania darinw. Jacklyn skorzystaæ z pomocy t³umacza znaj¹cego branden bruce¿y zadzwoniæ pod numer [phone]. Ta us³uga jest bezp³atna.  Luxembourger: ????? ??????? ????????????????????? ??????????????????????????????????[phone] ???????????????? ? ????????????????? ?????

## 2025-04-03 ENCOUNTER — APPOINTMENT (OUTPATIENT)
Dept: MEDICAL GROUP | Facility: MEDICAL CENTER | Age: 70
End: 2025-04-03
Payer: MEDICARE

## 2025-04-07 ENCOUNTER — OFFICE VISIT (OUTPATIENT)
Dept: ENDOCRINOLOGY | Facility: MEDICAL CENTER | Age: 70
End: 2025-04-07
Attending: INTERNAL MEDICINE
Payer: MEDICARE

## 2025-04-07 VITALS
BODY MASS INDEX: 28.13 KG/M2 | HEIGHT: 61 IN | SYSTOLIC BLOOD PRESSURE: 120 MMHG | WEIGHT: 149 LBS | DIASTOLIC BLOOD PRESSURE: 62 MMHG | OXYGEN SATURATION: 99 % | HEART RATE: 86 BPM

## 2025-04-07 DIAGNOSIS — E06.3 HASHIMOTO'S DISEASE: ICD-10-CM

## 2025-04-07 DIAGNOSIS — H92.01 RIGHT EAR PAIN: ICD-10-CM

## 2025-04-07 DIAGNOSIS — E55.9 VITAMIN D DEFICIENCY: ICD-10-CM

## 2025-04-07 DIAGNOSIS — E04.1 THYROID NODULE: ICD-10-CM

## 2025-04-07 PROCEDURE — 99214 OFFICE O/P EST MOD 30 MIN: CPT | Performed by: INTERNAL MEDICINE

## 2025-04-07 PROCEDURE — 99211 OFF/OP EST MAY X REQ PHY/QHP: CPT | Performed by: INTERNAL MEDICINE

## 2025-04-07 PROCEDURE — 3074F SYST BP LT 130 MM HG: CPT | Performed by: INTERNAL MEDICINE

## 2025-04-07 PROCEDURE — 3078F DIAST BP <80 MM HG: CPT | Performed by: INTERNAL MEDICINE

## 2025-04-07 ASSESSMENT — FIBROSIS 4 INDEX: FIB4 SCORE: 0.76

## 2025-04-07 NOTE — PROGRESS NOTES
Chief Complaint: Follow-up for. for evaluation of Hashimoto's thyroiditis with normal TSH levels and thyroid nodule    HPI:     Nita Sepulveda is a 69 y.o. female with Hashimoto's thyroiditis first discovered on March 31, 2023 her baseline TPO antibodies were 23 and thyroglobulin antibodies were less than 0.9.  Baseline TSH levels have been as high as 4.0 with normal free T4 levels of 0.96 last TSH was 2.6 with a free T40.95 On October 18, 2023.    She has a family history of Hashimoto's thyroiditis with her aunt.  She has a history of heart disease with her father.    She also had a formal thyroid ultrasound with radiology on April 15, 2023 showing a heterogenous and hypervascular thyroid gland compatible with Hashimoto's thyroiditis and she has a 7 mm solid nodule on the left upper lobe.  Interestingly the final report by radiology did not include this particular solid nodule    She denies a family history of thyroid cancer, she denies radiation exposure to the head and neck as a child.  She denies anterior neck compressive symptoms      =========================    After her initial consultation visit we reevaluate her thyroid function and establish that she was still euthyroid.  She also had a follow-up ultrasound in the office on April 2024 to assess the left upper lobe nodule and was stable    On follow-up she continues to remain euthyroid and she denies symptoms of hypothyroidism.    She is only complaining of right ear ache and was already recently treated with steroids by another provider without resolution of her symptoms.  She has a follow-up with the ENT for her ear pain        Her most recent TSH is normal 2.2 on March 2025 vitamin D is low at 29.  Rest of her CMP was normal          Patient's medications, allergies, and social histories were reviewed and updated as appropriate.      ROS:     CONS:     No fever, no chills, no weight loss, no fatigue   EYES:      No diplopia, no blurry vision, no  redness of eyes, no swelling of eyelids   ENT:    No hearing loss, No ear pain, No sore throat, no dysphagia, no neck swelling   CV:     No chest pain, no palpitations, no claudication, no orthopnea, no PND   PULM:    No SOB, no cough, no hemoptysis, no wheezing    GI:   No nausea, no vomiting, no diarrhea, no constipation, no bloody stools   :  Passing urine well, no dysuria, no hematuria   ENDO:   No polyuria, no polydipsia, no heat intolerance, no cold intolerance   NEURO: No headaches, no dizziness, no convulsions, no tremors   MUSC:  No joint swellings, no arthralgias, no myalgias, no weakness   SKIN:   No rash, no ulcers, no dry skin, reports history of hair loss   PSYCH:   No depression, no anxiety, no difficulty sleeping       Past Medical History:  Patient Active Problem List    Diagnosis Date Noted    Degenerative arthritis of left knee 06/25/2024    Thyroid nodule 01/31/2024    Chronic pansinusitis 11/28/2023    Vitamin D deficiency 11/28/2023    Hypertensive retinopathy of both eyes 06/13/2023    Postnasal drip 04/04/2023    Hashimoto's disease 04/04/2023    Mixed hyperlipidemia 04/04/2023    Impaired fasting blood sugar 04/04/2023    Throat fullness 04/04/2023    Primary hypertension 04/04/2023    Other hemorrhoids 03/01/2022    Chronic pain of left knee 03/16/2021    Chronic gastric ulcer without hemorrhage and without perforation 01/20/2021    Recurrent UTI 08/30/2011       Past Surgical History:  Past Surgical History:   Procedure Laterality Date    PB TOTAL KNEE ARTHROPLASTY Left 10/1/2024    Procedure: LEFT TOTAL KNEE ARTHROPLASTY;  Surgeon: Shilo Templeton M.D.;  Location: [facility];  Service: Orthopedics    TUBAL COAGULATION LAPAROSCOPIC BILATERAL      TUBAL LIGATION          Allergies:  Acyclovir and Hydrocodone-acetaminophen     Current Medications:    Current Outpatient Medications:     montelukast (SINGULAIR) 10 MG Tab, TAKE 1 TABLET BY MOUTH EVERY DAY, Disp: 90 Tablet,  Rfl: 0    rosuvastatin (CRESTOR) 10 MG Tab, Take 1 Tablet by mouth every evening., Disp: 100 Tablet, Rfl: 3    pantoprazole (PROTONIX) 20 MG tablet, TAKE 1 TABLET BY MOUTH EVERY DAY, Disp: 90 Tablet, Rfl: 3    losartan (COZAAR) 25 MG Tab, Take 1 Tablet by mouth every day., Disp: 100 Tablet, Rfl: 1    ibuprofen (MOTRIN) 800 MG Tab, TAKE 1 TABLET BY MOUTH EVERY 8 HOURS AS NEEDED FOR INFLAMMATION OR MODERATE PAIN., Disp: 90 Tablet, Rfl: 0    azelastine (ASTELIN) 137 MCG/SPRAY nasal spray, 2 sprays in each nostril Nasally Twice a day for 90 day(s), Disp: , Rfl:     fluticasone (FLONASE) 50 MCG/ACT nasal spray, 1 spray in each nostril Nasally Once a day for 90 day(s), Disp: , Rfl:     ibuprofen (MOTRIN) 200 MG Tab, Take 200 mg by mouth every 6 hours as needed., Disp: , Rfl:     cyclobenzaprine (FLEXERIL) 5 mg tablet, Take 1 Tablet by mouth 3 times a day as needed for Muscle Spasms., Disp: 30 Tablet, Rfl: 0    Multiple Vitamins-Minerals (MULTIVITAMIN ADULT PO), Take 1 Tablet by mouth every day., Disp: , Rfl:     methylPREDNISolone (MEDROL DOSEPAK) 4 MG Tablet Therapy Pack, As directed on the packaging label. (Patient not taking: Reported on 4/7/2025), Disp: 21 Tablet, Rfl: 0    Social History:  Social History     Socioeconomic History    Marital status:      Spouse name: Not on file    Number of children: Not on file    Years of education: Not on file    Highest education level: Associate degree: occupational, technical, or vocational program   Occupational History    Not on file   Tobacco Use    Smoking status: Never     Passive exposure: Past    Smokeless tobacco: Never    Tobacco comments:     Never   Vaping Use    Vaping status: Never Used   Substance and Sexual Activity    Alcohol use: Not Currently     Comment: occ    Drug use: Never    Sexual activity: Not Currently     Partners: Male     Birth control/protection: Female Sterilization     Comment: worked for [employer]   Other Topics Concern    Not on  file   Social History Narrative    Not on file     Social Drivers of Health     Financial Resource Strain: Low Risk  (4/1/2023)    Overall Financial Resource Strain (CARDIA)     Difficulty of Paying Living Expenses: Not very hard   Food Insecurity: No Food Insecurity (4/1/2023)    Hunger Vital Sign     Worried About Running Out of Food in the Last Year: Never true     Ran Out of Food in the Last Year: Never true   Transportation Needs: No Transportation Needs (4/1/2023)    PRAPARE - Transportation     Lack of Transportation (Medical): No     Lack of Transportation (Non-Medical): No   Physical Activity: Insufficiently Active (4/1/2023)    Exercise Vital Sign     Days of Exercise per Week: 3 days     Minutes of Exercise per Session: 30 min   Stress: Stress Concern Present (4/1/2023)    [place] [employer] - Occupational Stress Questionnaire     Feeling of Stress : To some extent   Social Connections: Socially Isolated (4/1/2023)    Social Connection and Isolation Panel [NHANES]     Frequency of Communication with Friends and Family: More than three times a week     Frequency of Social Gatherings with Friends and Family: More than three times a week     Attends Zoroastrian Services: Never     Active Member of Clubs or Organizations: No     Attends Club or Organization Meetings: Never     Marital Status:    Intimate Partner Violence: Not on file   Housing Stability: Low Risk  (4/1/2023)    Housing Stability Vital Sign     Unable to Pay for Housing in the Last Year: No     Number of Places Lived in the Last Year: 1     Unstable Housing in the Last Year: No        Family History:   Family History   Problem Relation Age of Onset    Psychiatric Illness Mother     Cancer Father     Heart Disease Father     Cancer Sister         breast    Diabetes Son     Thyroid Son          PHYSICAL EXAM:   Vital signs: /62 (BP Location: Left arm, Patient Position: Sitting, BP Cuff Size: Adult long)   Pulse  "86   Ht 1.549 m (5' 1\")   Wt 67.6 kg (149 lb)   SpO2 99%   BMI 28.15 kg/m²   GENERAL: Well-developed, well-nourished  in no apparent distress.   EYE: No ocular and eyelid asymmetry, Anicteric sclerae,  PERRL  HENT: Hearing grossly intact, Normocephalic, atraumatic. Pink, moist mucous membranes, No exudate  NECK: Supple. Trachea midline. thyroid is normal in size without nodules or tenderness  CARDIOVASCULAR: Regular rate and rhythm. No murmurs, rubs, or gallops.   LUNGS: Clear to auscultation bilaterally   ABDOMEN: Soft, nontender with positive bowel sounds.   EXTREMITIES: No clubbing, cyanosis, or edema.   NEUROLOGICAL: Cranial nerves II-XII are grossly intact   Symmetric reflexes at the patella no proximal muscle weakness  LYMPH: No cervical, supraclavicular,  adenopathy palpated.   SKIN: No rashes, lesions. Turgor is normal.    Labs:  Lab Results   Component Value Date/Time    WBC 9.7 10/11/2024 02:13 PM    RBC 4.16 (L) 10/11/2024 02:13 PM    HEMOGLOBIN 12.4 10/11/2024 02:13 PM    MCV 88.7 10/11/2024 02:13 PM    MCH 29.8 10/11/2024 02:13 PM    MCHC 33.6 10/11/2024 02:13 PM    RDW 42.8 10/11/2024 02:13 PM    MPV 9.8 10/11/2024 02:13 PM       Lab Results   Component Value Date/Time    SODIUM 138 03/28/2025 09:58 AM    POTASSIUM 4.1 03/28/2025 09:58 AM    CHLORIDE 103 03/28/2025 09:58 AM    CO2 23 03/28/2025 09:58 AM    ANION 12.0 03/28/2025 09:58 AM    GLUCOSE 94 03/28/2025 09:58 AM    BUN 13 03/28/2025 09:58 AM    CREATININE 0.87 03/28/2025 09:58 AM    CALCIUM 9.1 03/28/2025 09:58 AM    ASTSGOT 20 03/28/2025 09:58 AM    ALTSGPT 14 03/28/2025 09:58 AM    TBILIRUBIN <0.2 03/28/2025 09:58 AM    ALBUMIN 4.2 03/28/2025 09:58 AM    TOTPROTEIN 6.6 03/28/2025 09:58 AM    GLOBULIN 2.4 03/28/2025 09:58 AM    AGRATIO 1.8 03/28/2025 09:58 AM       Lab Results   Component Value Date/Time    CHOLSTRLTOT 224 (H) 10/18/2023 0954    TRIGLYCERIDE 175 (H) 10/18/2023 0954    HDL 56 10/18/2023 0954     (H) 10/18/2023 0954 " "      Lab Results   Component Value Date/Time    TSHULTRASEN 2.620 10/18/2023 0954     Lab Results   Component Value Date/Time    FREET4 0.95 10/18/2023 0954     No results found for: \"FREET3\"  No results found for: \"THYSTIMIG\"    Lab Results   Component Value Date/Time    MICROSOMALA 11.0 (H) 10/18/2023 0954         Imagin/15/2023 10:15 AM     HISTORY/REASON FOR EXAM:  Anti-TPO antibodies        TECHNIQUE/EXAM DESCRIPTION:  Ultrasound of the soft tissues of the head and neck.     COMPARISON:  None     FINDINGS:  The thyroid gland is heterogeneous.  Vascularity is normal.     The right lobe of the thyroid gland measures 1.10 cm x 4.57 cm x 1.38 cm. The contour and echogenicity are normal. No focal mass lesions are identified.  The left lobe of the thyroid gland measures 1.21 cm x 3.90 cm x 1.32 cm. The contour and echogenicity are normal. No significant focal mass lesions are identified. Small echogenic 7 mm mass is noted in the upper pole.  The isthmus measures 0.36 cm.    ASSESSMENT/PLAN:     1. Hashimoto's disease  Stable  Reviewed again pathogenesis of this condition  Because she is euthyroid biochemically there is no indication to start thyroid hormone.  She is at low risk for progression of this disorder because the TPO antibody titers are very low  I want her to follow-up in 1 year with repeat thyroid labs    2. Thyroid nodule  Stable she has a less than 1 cm hyperechoic solid nodule in the left upper lobe  It has no suspicious features such as microcalcifications  Biopsy is not indicated at this time  Recommend observation  We will repeat her ultrasound with radiology next year    3. Vitamin D deficiency  Unstable  She has low vitamin D levels at baseline and has had a negative test for celiac disease  I want her to restart vitamin D replacement with over-the-counter vitamin D3 2000 IU daily repeat vitamin D levels next year    Return in about 1 year (around 2026).       Thank you kindly for " allowing me to participate in the thyroid care plan for this patient.    Devonte Tavarez MD, [facility], [facility]    CC:   Gael Patel M.D.

## 2025-04-08 ENCOUNTER — APPOINTMENT (OUTPATIENT)
Dept: MEDICAL GROUP | Facility: MEDICAL CENTER | Age: 70
End: 2025-04-08
Payer: MEDICARE

## 2025-04-11 ENCOUNTER — HOSPITAL ENCOUNTER (OUTPATIENT)
Dept: RADIOLOGY | Facility: MEDICAL CENTER | Age: 70
End: 2025-04-11
Attending: FAMILY MEDICINE
Payer: MEDICARE

## 2025-04-11 DIAGNOSIS — J32.4 CHRONIC PANSINUSITIS: ICD-10-CM

## 2025-04-11 PROCEDURE — 70486 CT MAXILLOFACIAL W/O DYE: CPT

## 2025-04-14 ENCOUNTER — RESULTS FOLLOW-UP (OUTPATIENT)
Dept: MEDICAL GROUP | Facility: MEDICAL CENTER | Age: 70
End: 2025-04-14

## 2025-06-30 ENCOUNTER — HOSPITAL ENCOUNTER (OUTPATIENT)
Dept: LAB | Facility: MEDICAL CENTER | Age: 70
End: 2025-06-30
Attending: FAMILY MEDICINE
Payer: MEDICARE

## 2025-06-30 ENCOUNTER — RESULTS FOLLOW-UP (OUTPATIENT)
Dept: MEDICAL GROUP | Facility: MEDICAL CENTER | Age: 70
End: 2025-06-30

## 2025-06-30 DIAGNOSIS — E78.2 MIXED HYPERLIPIDEMIA: ICD-10-CM

## 2025-06-30 DIAGNOSIS — R73.01 IMPAIRED FASTING BLOOD SUGAR: ICD-10-CM

## 2025-06-30 DIAGNOSIS — I10 PRIMARY HYPERTENSION: ICD-10-CM

## 2025-06-30 DIAGNOSIS — E06.3 HASHIMOTO'S DISEASE: ICD-10-CM

## 2025-06-30 LAB
ALBUMIN SERPL BCP-MCNC: 4 G/DL (ref 3.2–4.9)
ALBUMIN/GLOB SERPL: 1.5 G/DL
ALP SERPL-CCNC: 112 U/L (ref 30–99)
ALT SERPL-CCNC: 34 U/L (ref 2–50)
ANION GAP SERPL CALC-SCNC: 12 MMOL/L (ref 7–16)
AST SERPL-CCNC: 26 U/L (ref 12–45)
BASOPHILS # BLD AUTO: 0.7 % (ref 0–1.8)
BASOPHILS # BLD: 0.05 K/UL (ref 0–0.12)
BILIRUB SERPL-MCNC: 0.4 MG/DL (ref 0.1–1.5)
BUN SERPL-MCNC: 13 MG/DL (ref 8–22)
CALCIUM ALBUM COR SERPL-MCNC: 9 MG/DL (ref 8.5–10.5)
CALCIUM SERPL-MCNC: 9 MG/DL (ref 8.5–10.5)
CHLORIDE SERPL-SCNC: 106 MMOL/L (ref 96–112)
CHOLEST SERPL-MCNC: 234 MG/DL (ref 100–199)
CO2 SERPL-SCNC: 24 MMOL/L (ref 20–33)
CREAT SERPL-MCNC: 0.96 MG/DL (ref 0.5–1.4)
EOSINOPHIL # BLD AUTO: 0.34 K/UL (ref 0–0.51)
EOSINOPHIL NFR BLD: 5.1 % (ref 0–6.9)
ERYTHROCYTE [DISTWIDTH] IN BLOOD BY AUTOMATED COUNT: 44.3 FL (ref 35.9–50)
EST. AVERAGE GLUCOSE BLD GHB EST-MCNC: 120 MG/DL
FASTING STATUS PATIENT QL REPORTED: NORMAL
GFR SERPLBLD CREATININE-BSD FMLA CKD-EPI: 64 ML/MIN/1.73 M 2
GLOBULIN SER CALC-MCNC: 2.6 G/DL (ref 1.9–3.5)
GLUCOSE SERPL-MCNC: 98 MG/DL (ref 65–99)
HBA1C MFR BLD: 5.8 % (ref 4–5.6)
HCT VFR BLD AUTO: 40.2 % (ref 37–47)
HDLC SERPL-MCNC: 61 MG/DL
HGB BLD-MCNC: 13.3 G/DL (ref 12–16)
IMM GRANULOCYTES # BLD AUTO: 0.05 K/UL (ref 0–0.11)
IMM GRANULOCYTES NFR BLD AUTO: 0.7 % (ref 0–0.9)
LDLC SERPL CALC-MCNC: 131 MG/DL
LYMPHOCYTES # BLD AUTO: 1.9 K/UL (ref 1–4.8)
LYMPHOCYTES NFR BLD: 28.5 % (ref 22–41)
MCH RBC QN AUTO: 29.6 PG (ref 27–33)
MCHC RBC AUTO-ENTMCNC: 33.1 G/DL (ref 32.2–35.5)
MCV RBC AUTO: 89.3 FL (ref 81.4–97.8)
MONOCYTES # BLD AUTO: 0.48 K/UL (ref 0–0.85)
MONOCYTES NFR BLD AUTO: 7.2 % (ref 0–13.4)
NEUTROPHILS # BLD AUTO: 3.85 K/UL (ref 1.82–7.42)
NEUTROPHILS NFR BLD: 57.8 % (ref 44–72)
NRBC # BLD AUTO: 0 K/UL
NRBC BLD-RTO: 0 /100 WBC (ref 0–0.2)
PLATELET # BLD AUTO: 345 K/UL (ref 164–446)
PMV BLD AUTO: 11.2 FL (ref 9–12.9)
POTASSIUM SERPL-SCNC: 4 MMOL/L (ref 3.6–5.5)
PROT SERPL-MCNC: 6.6 G/DL (ref 6–8.2)
RBC # BLD AUTO: 4.5 M/UL (ref 4.2–5.4)
SODIUM SERPL-SCNC: 142 MMOL/L (ref 135–145)
T4 FREE SERPL-MCNC: 0.94 NG/DL (ref 0.93–1.7)
TRIGL SERPL-MCNC: 209 MG/DL (ref 0–149)
TSH SERPL-ACNC: 3.58 UIU/ML (ref 0.38–5.33)
WBC # BLD AUTO: 6.7 K/UL (ref 4.8–10.8)

## 2025-06-30 PROCEDURE — 83036 HEMOGLOBIN GLYCOSYLATED A1C: CPT

## 2025-06-30 PROCEDURE — 80061 LIPID PANEL: CPT

## 2025-06-30 PROCEDURE — 80053 COMPREHEN METABOLIC PANEL: CPT

## 2025-06-30 PROCEDURE — 85025 COMPLETE CBC W/AUTO DIFF WBC: CPT

## 2025-06-30 PROCEDURE — 84443 ASSAY THYROID STIM HORMONE: CPT

## 2025-06-30 PROCEDURE — 36415 COLL VENOUS BLD VENIPUNCTURE: CPT

## 2025-06-30 PROCEDURE — 84439 ASSAY OF FREE THYROXINE: CPT

## 2025-07-01 ENCOUNTER — OFFICE VISIT (OUTPATIENT)
Dept: MEDICAL GROUP | Facility: MEDICAL CENTER | Age: 70
End: 2025-07-01
Payer: MEDICARE

## 2025-07-01 VITALS
BODY MASS INDEX: 26.8 KG/M2 | DIASTOLIC BLOOD PRESSURE: 70 MMHG | WEIGHT: 151.24 LBS | TEMPERATURE: 99 F | HEIGHT: 63 IN | HEART RATE: 60 BPM | OXYGEN SATURATION: 96 % | SYSTOLIC BLOOD PRESSURE: 138 MMHG

## 2025-07-01 DIAGNOSIS — E78.2 MIXED HYPERLIPIDEMIA: Primary | ICD-10-CM

## 2025-07-01 DIAGNOSIS — R73.03 PREDIABETES: ICD-10-CM

## 2025-07-01 DIAGNOSIS — I10 PRIMARY HYPERTENSION: ICD-10-CM

## 2025-07-01 DIAGNOSIS — Z12.31 ENCOUNTER FOR SCREENING MAMMOGRAM FOR BREAST CANCER: ICD-10-CM

## 2025-07-01 PROCEDURE — 99214 OFFICE O/P EST MOD 30 MIN: CPT | Performed by: FAMILY MEDICINE

## 2025-07-01 PROCEDURE — 3075F SYST BP GE 130 - 139MM HG: CPT | Performed by: FAMILY MEDICINE

## 2025-07-01 PROCEDURE — 3078F DIAST BP <80 MM HG: CPT | Performed by: FAMILY MEDICINE

## 2025-07-01 ASSESSMENT — FIBROSIS 4 INDEX: FIB4 SCORE: 0.89

## 2025-07-01 ASSESSMENT — ENCOUNTER SYMPTOMS
CHILLS: 0
FEVER: 0
PALPITATIONS: 0

## 2025-07-01 NOTE — PROGRESS NOTES
FAMILY MEDICINE VISIT                                                               Assessment/Plan:         1. Mixed hyperlipidemia (Primary)  Chronic condition, improving, continue red rice yeast extract.  Monitor liver function closely  - Comp Metabolic Panel; Future  - Lipid Profile; Future    2. Primary hypertension  Chronic condition, stable, continue losartan 25 g daily    3. Prediabetes  New condition, unstable, she is going to work on diet and exercise.    - HEMOGLOBIN A1C; Future    4. Encounter for screening mammogram for breast cancer  - MA-SCREENING MAMMO BILAT W/TOMOSYNTHESIS W/CAD; Future     Follow up in 3-4 months for annual physical and lab follow-up.      Chief complaint::The primary encounter diagnosis was Mixed hyperlipidemia. Diagnoses of Primary hypertension, Prediabetes, and Encounter for screening mammogram for breast cancer were also pertinent to this visit.    History of present illness: Nita Sepulveda is a 69 y.o. female who presented for lab follow-up.    Cholesterol numbers are improving.  She is not able to tolerate statins as she gets hand joint pain due to that.  Her CT cardiac score is negative.  GFR is mildly low.  She is dehydrated.  A1c came back high.  She reports that she had some drinks at her daughter's bachelorette party.      Review of systems:     Review of Systems   Constitutional:  Negative for chills and fever.   Cardiovascular:  Negative for chest pain, palpitations and leg swelling.        Medications and Allergies:       Current Outpatient Medications:     Red Yeast Rice Extract (RED YEAST RICE PO), Take  by mouth., Taking    Berberine Chloride (BERBERINE HCI PO), Take  by mouth., Taking    montelukast, 10 mg, Oral, DAILY, Taking    pantoprazole, 20 mg, Oral, DAILY, Taking    losartan, 25 mg, Oral, DAILY, Taking    ibuprofen, 800 mg, Oral, Q8HRS PRN, Taking As Needed    azelastine, 2 sprays in each nostril Nasally Twice a day for 90 day(s), Taking    fluticasone, 1  "spray in each nostril Nasally Once a day for 90 day(s), Taking    cyclobenzaprine, 5 mg, Oral, TID PRN, Taking As Needed    Multiple Vitamins-Minerals (MULTIVITAMIN ADULT PO), 1 Tablet, Oral, DAILY, Taking        Vitals:    /70   Pulse 60   Temp 37.2 °C (99 °F) (Temporal)   Ht 1.588 m (5' 2.5\")   Wt 68.6 kg (151 lb 3.8 oz)   SpO2 96%  Body mass index is 27.22 kg/m².    Physical Exam:     Physical Exam  Constitutional:       Appearance: Normal appearance. She is well-developed and well-groomed.   HENT:      Head: Normocephalic and atraumatic.      Right Ear: External ear normal.      Left Ear: External ear normal.   Eyes:      General:         Right eye: No discharge.         Left eye: No discharge.      Conjunctiva/sclera: Conjunctivae normal.   Cardiovascular:      Rate and Rhythm: Normal rate.   Pulmonary:      Effort: Pulmonary effort is normal. No respiratory distress.   Musculoskeletal:      Cervical back: Neck supple.   Skin:     Findings: No rash.   Neurological:      Mental Status: She is alert.   Psychiatric:         Mood and Affect: Mood and affect normal.         Behavior: Behavior normal.          Labs:  I reviewed with patient recent labs resulted on 6/30/2025.        Please note that this dictation was created using voice recognition software. I have made every reasonable attempt to correct obvious errors, but I expect that there are errors of grammar and possibly content that I did not discover before finalizing the note.      "

## 2025-07-03 DIAGNOSIS — I10 PRIMARY HYPERTENSION: ICD-10-CM

## 2025-07-03 RX ORDER — LOSARTAN POTASSIUM 25 MG/1
25 TABLET ORAL DAILY
Qty: 100 TABLET | Refills: 3 | Status: SHIPPED | OUTPATIENT
Start: 2025-07-03

## 2025-07-05 DIAGNOSIS — R09.82 POSTNASAL DRIP: ICD-10-CM

## 2025-07-07 RX ORDER — MONTELUKAST SODIUM 10 MG/1
10 TABLET ORAL DAILY
Qty: 100 TABLET | Refills: 3 | Status: SHIPPED | OUTPATIENT
Start: 2025-07-07

## 2025-07-22 ENCOUNTER — HOSPITAL ENCOUNTER (OUTPATIENT)
Dept: RADIOLOGY | Facility: MEDICAL CENTER | Age: 70
End: 2025-07-22
Attending: FAMILY MEDICINE
Payer: MEDICARE

## 2025-07-22 DIAGNOSIS — Z12.31 ENCOUNTER FOR SCREENING MAMMOGRAM FOR BREAST CANCER: ICD-10-CM

## 2025-07-22 PROCEDURE — 77063 BREAST TOMOSYNTHESIS BI: CPT
